# Patient Record
Sex: FEMALE | Race: BLACK OR AFRICAN AMERICAN | NOT HISPANIC OR LATINO | Employment: FULL TIME | URBAN - METROPOLITAN AREA
[De-identification: names, ages, dates, MRNs, and addresses within clinical notes are randomized per-mention and may not be internally consistent; named-entity substitution may affect disease eponyms.]

---

## 2023-03-08 DIAGNOSIS — G89.29 OTHER CHRONIC PAIN: Primary | ICD-10-CM

## 2023-03-13 RX ORDER — GABAPENTIN 300 MG/1
CAPSULE ORAL
Qty: 180 CAPSULE | Refills: 0 | Status: SHIPPED | OUTPATIENT
Start: 2023-03-13 | End: 2023-06-15

## 2023-05-18 LAB
ALANINE AMINOTRANSFERASE (SGPT) (U/L) IN SER/PLAS: 24 U/L (ref 7–45)
ALBUMIN (G/DL) IN SER/PLAS: 4 G/DL (ref 3.4–5)
ALKALINE PHOSPHATASE (U/L) IN SER/PLAS: 82 U/L (ref 33–136)
ANION GAP IN SER/PLAS: 13 MMOL/L (ref 10–20)
ASPARTATE AMINOTRANSFERASE (SGOT) (U/L) IN SER/PLAS: 17 U/L (ref 9–39)
BILIRUBIN TOTAL (MG/DL) IN SER/PLAS: 0.5 MG/DL (ref 0–1.2)
CALCIUM (MG/DL) IN SER/PLAS: 9.9 MG/DL (ref 8.6–10.6)
CARBON DIOXIDE, TOTAL (MMOL/L) IN SER/PLAS: 28 MMOL/L (ref 21–32)
CHLORIDE (MMOL/L) IN SER/PLAS: 107 MMOL/L (ref 98–107)
CREATININE (MG/DL) IN SER/PLAS: 0.91 MG/DL (ref 0.5–1.05)
ESTIMATED AVERAGE GLUCOSE FOR HBA1C: 194 MG/DL
GFR FEMALE: 72 ML/MIN/1.73M2
GLUCOSE (MG/DL) IN SER/PLAS: 105 MG/DL (ref 74–99)
HEMOGLOBIN A1C/HEMOGLOBIN TOTAL IN BLOOD: 8.4 %
POTASSIUM (MMOL/L) IN SER/PLAS: 4.7 MMOL/L (ref 3.5–5.3)
PROTEIN TOTAL: 7.1 G/DL (ref 6.4–8.2)
SODIUM (MMOL/L) IN SER/PLAS: 143 MMOL/L (ref 136–145)
UREA NITROGEN (MG/DL) IN SER/PLAS: 24 MG/DL (ref 6–23)

## 2023-08-04 DIAGNOSIS — G89.29 OTHER CHRONIC PAIN: ICD-10-CM

## 2023-08-11 RX ORDER — GABAPENTIN 300 MG/1
300 CAPSULE ORAL 2 TIMES DAILY
Qty: 180 CAPSULE | Refills: 0 | OUTPATIENT
Start: 2023-08-11

## 2023-08-21 DIAGNOSIS — G89.29 OTHER CHRONIC PAIN: ICD-10-CM

## 2023-08-28 RX ORDER — GABAPENTIN 300 MG/1
CAPSULE ORAL
Qty: 180 CAPSULE | Refills: 0 | OUTPATIENT
Start: 2023-08-28

## 2023-09-30 DIAGNOSIS — I10 HYPERTENSION, UNSPECIFIED TYPE: Primary | ICD-10-CM

## 2023-10-09 RX ORDER — METOPROLOL SUCCINATE 50 MG/1
50 TABLET, EXTENDED RELEASE ORAL DAILY
Qty: 90 TABLET | Refills: 1 | Status: SHIPPED | OUTPATIENT
Start: 2023-10-09 | End: 2023-11-07 | Stop reason: SDUPTHER

## 2023-10-17 ENCOUNTER — TELEPHONE (OUTPATIENT)
Dept: ENDOCRINOLOGY | Facility: CLINIC | Age: 63
End: 2023-10-17
Payer: COMMERCIAL

## 2023-10-17 NOTE — TELEPHONE ENCOUNTER
Pt states that she has had cramping in both legs and in one leg on Sunday she woke up out of sleep to a constricted feeling in just one leg. No swelling. Pt has not contacted PCP as she thought this has to do with neuropathy since she is on gabapentin. I did advise pt that she should contact pcp also.

## 2023-10-20 PROBLEM — M25.521 RIGHT ELBOW PAIN: Status: ACTIVE | Noted: 2023-10-20

## 2023-10-20 PROBLEM — J30.9 ALLERGIC RHINITIS: Status: ACTIVE | Noted: 2023-10-20

## 2023-10-20 PROBLEM — E11.3513 TYPE 2 DIABETES MELLITUS WITH PROLIFERATIVE DIABETIC RETINOPATHY WITH MACULAR EDEMA, BILATERAL (MULTI): Status: ACTIVE | Noted: 2023-10-20

## 2023-10-20 PROBLEM — H25.813 COMBINED FORM OF AGE-RELATED CATARACT, BOTH EYES: Status: ACTIVE | Noted: 2023-10-20

## 2023-10-20 PROBLEM — U07.1 COVID-19 VIRUS INFECTION: Status: ACTIVE | Noted: 2023-10-20

## 2023-10-20 PROBLEM — N39.0 RECURRENT UTI: Status: ACTIVE | Noted: 2023-10-20

## 2023-10-20 PROBLEM — M25.512 LEFT SHOULDER PAIN: Status: ACTIVE | Noted: 2023-10-20

## 2023-10-20 PROBLEM — E78.5 HLD (HYPERLIPIDEMIA): Status: ACTIVE | Noted: 2023-10-20

## 2023-10-20 PROBLEM — G47.33 OBSTRUCTIVE SLEEP APNEA: Status: ACTIVE | Noted: 2023-10-20

## 2023-10-20 PROBLEM — R29.898 LEFT ARM WEAKNESS: Status: ACTIVE | Noted: 2023-10-20

## 2023-10-20 PROBLEM — E11.65 POORLY CONTROLLED DIABETES MELLITUS (MULTI): Status: ACTIVE | Noted: 2023-10-20

## 2023-10-20 PROBLEM — H52.10 MYOPIA WITH PRESBYOPIA: Status: ACTIVE | Noted: 2023-10-20

## 2023-10-20 PROBLEM — M54.12 CERVICAL RADICULOPATHY: Status: ACTIVE | Noted: 2023-10-20

## 2023-10-20 PROBLEM — E11.3492: Status: ACTIVE | Noted: 2023-10-20

## 2023-10-20 PROBLEM — M79.603 ARM PAIN, LATERAL: Status: ACTIVE | Noted: 2023-10-20

## 2023-10-20 PROBLEM — S63.502A LEFT WRIST SPRAIN, INITIAL ENCOUNTER: Status: ACTIVE | Noted: 2023-10-20

## 2023-10-20 PROBLEM — M79.602 PAIN OF LEFT UPPER EXTREMITY: Status: ACTIVE | Noted: 2023-10-20

## 2023-10-20 PROBLEM — M75.02 ADHESIVE CAPSULITIS OF LEFT SHOULDER: Status: ACTIVE | Noted: 2023-10-20

## 2023-10-20 PROBLEM — H43.393 VITREOUS SYNERESIS OF BOTH EYES: Status: ACTIVE | Noted: 2023-10-20

## 2023-10-20 PROBLEM — R15.9 FECAL INCONTINENCE: Status: ACTIVE | Noted: 2023-10-20

## 2023-10-20 PROBLEM — H53.9 VISION CHANGES: Status: ACTIVE | Noted: 2023-10-20

## 2023-10-20 PROBLEM — K59.00 CONSTIPATION: Status: ACTIVE | Noted: 2023-10-20

## 2023-10-20 PROBLEM — E11.3599 PDR (PROLIFERATIVE DIABETIC RETINOPATHY) (MULTI): Status: ACTIVE | Noted: 2023-10-20

## 2023-10-20 PROBLEM — M54.50 LOW BACK PAIN: Status: ACTIVE | Noted: 2023-10-20

## 2023-10-20 PROBLEM — R06.83 SNORING: Status: ACTIVE | Noted: 2023-10-20

## 2023-10-20 PROBLEM — N39.41 URGE INCONTINENCE OF URINE: Status: ACTIVE | Noted: 2023-10-20

## 2023-10-20 PROBLEM — H52.4 MYOPIA WITH PRESBYOPIA: Status: ACTIVE | Noted: 2023-10-20

## 2023-10-20 PROBLEM — K58.0 IRRITABLE BOWEL SYNDROME WITH DIARRHEA: Status: ACTIVE | Noted: 2023-10-20

## 2023-10-20 PROBLEM — M77.11 LATERAL EPICONDYLITIS OF RIGHT ELBOW: Status: ACTIVE | Noted: 2023-10-20

## 2023-10-20 PROBLEM — S49.92XA SHOULDER INJURY, LEFT, INITIAL ENCOUNTER: Status: ACTIVE | Noted: 2023-10-20
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
## 2023-10-20 PROBLEM — G56.01 RIGHT CARPAL TUNNEL SYNDROME: Status: ACTIVE | Noted: 2023-10-20

## 2023-10-20 PROBLEM — R20.2 TINGLING IN EXTREMITIES: Status: ACTIVE | Noted: 2023-10-20

## 2023-10-20 PROBLEM — R19.7 DIARRHEA: Status: ACTIVE | Noted: 2023-10-20

## 2023-10-20 PROBLEM — S43.402A SPRAIN OF SHOULDER, LEFT: Status: ACTIVE | Noted: 2023-10-20

## 2023-10-20 PROBLEM — G56.02 LEFT CARPAL TUNNEL SYNDROME: Status: ACTIVE | Noted: 2023-10-20

## 2023-10-20 PROBLEM — I10 ESSENTIAL HYPERTENSION: Status: ACTIVE | Noted: 2023-10-20

## 2023-10-20 PROBLEM — H52.00 HYPEROPIA: Status: ACTIVE | Noted: 2023-10-20

## 2023-10-20 PROBLEM — M54.2 CERVICAL PAIN: Status: ACTIVE | Noted: 2023-10-20

## 2023-10-20 PROBLEM — R42 LIGHTHEADEDNESS: Status: ACTIVE | Noted: 2023-10-20

## 2023-10-20 PROBLEM — M65.4 RADIAL STYLOID TENOSYNOVITIS (DE QUERVAIN): Status: ACTIVE | Noted: 2023-10-20

## 2023-10-20 PROBLEM — H52.209 ASTIGMATISM: Status: ACTIVE | Noted: 2023-10-20

## 2023-10-20 PROBLEM — R25.2 MUSCLE CRAMPING: Status: ACTIVE | Noted: 2023-10-20

## 2023-10-20 PROBLEM — G47.00 INSOMNIA: Status: ACTIVE | Noted: 2023-10-20

## 2023-10-20 PROBLEM — S13.9XXA CERVICAL SPRAIN, INITIAL ENCOUNTER: Status: ACTIVE | Noted: 2023-10-20

## 2023-10-20 PROBLEM — R14.0 ABDOMINAL BLOATING: Status: ACTIVE | Noted: 2023-10-20

## 2023-10-20 PROBLEM — M76.31 ILIOTIBIAL BAND TENDINITIS OF RIGHT SIDE: Status: ACTIVE | Noted: 2023-10-20

## 2023-10-20 PROBLEM — R10.2 PELVIC PAIN IN FEMALE: Status: ACTIVE | Noted: 2023-10-20

## 2023-10-20 PROBLEM — R31.29 MICROHEMATURIA: Status: ACTIVE | Noted: 2023-10-20

## 2023-10-20 PROBLEM — M25.862 MASS OF JOINT OF LEFT KNEE: Status: ACTIVE | Noted: 2023-10-20

## 2023-10-20 PROBLEM — K21.9 GERD (GASTROESOPHAGEAL REFLUX DISEASE): Status: ACTIVE | Noted: 2023-10-20

## 2023-10-20 PROBLEM — S19.9XXA NECK INJURY, INITIAL ENCOUNTER: Status: ACTIVE | Noted: 2023-10-20

## 2023-10-20 RX ORDER — LEVOCETIRIZINE DIHYDROCHLORIDE 5 MG/1
1 TABLET, FILM COATED ORAL NIGHTLY
COMMUNITY
Start: 2022-09-27 | End: 2023-10-23 | Stop reason: ALTCHOICE

## 2023-10-20 RX ORDER — NEOMYCIN SULFATE, POLYMYXIN B SULFATE AND DEXAMETHASONE 3.5; 10000; 1 MG/ML; [USP'U]/ML; MG/ML
1 SUSPENSION/ DROPS OPHTHALMIC 4 TIMES DAILY
COMMUNITY
Start: 2022-02-18 | End: 2023-10-23 | Stop reason: ALTCHOICE

## 2023-10-20 RX ORDER — FLASH GLUCOSE SENSOR
KIT MISCELLANEOUS
COMMUNITY
Start: 2023-09-25 | End: 2024-02-23

## 2023-10-20 RX ORDER — PREDNISONE 20 MG/1
20 TABLET ORAL
COMMUNITY
Start: 2022-10-08 | End: 2023-10-23 | Stop reason: ALTCHOICE

## 2023-10-20 RX ORDER — NAPROXEN SODIUM 220 MG/1
81 TABLET, FILM COATED ORAL
COMMUNITY
Start: 2018-05-23

## 2023-10-20 RX ORDER — DULAGLUTIDE 4.5 MG/.5ML
INJECTION, SOLUTION SUBCUTANEOUS
COMMUNITY
Start: 2022-03-31 | End: 2024-01-21 | Stop reason: SDUPTHER

## 2023-10-20 RX ORDER — LANOLIN ALCOHOL/MO/W.PET/CERES
1 CREAM (GRAM) TOPICAL DAILY
COMMUNITY

## 2023-10-20 RX ORDER — ATORVASTATIN CALCIUM 40 MG/1
1 TABLET, FILM COATED ORAL DAILY
COMMUNITY
Start: 2017-04-10 | End: 2024-01-24 | Stop reason: SDUPTHER

## 2023-10-20 RX ORDER — INSULIN GLARGINE-YFGN 100 [IU]/ML
INJECTION, SOLUTION SUBCUTANEOUS
COMMUNITY
Start: 2021-12-06 | End: 2023-11-17

## 2023-10-20 RX ORDER — INSULIN LISPRO 100 [IU]/ML
INJECTION, SOLUTION INTRAVENOUS; SUBCUTANEOUS
COMMUNITY
Start: 2020-09-01 | End: 2023-10-23 | Stop reason: ALTCHOICE

## 2023-10-20 RX ORDER — MOXIFLOXACIN HYDROCHLORIDE 400 MG/1
1 TABLET ORAL DAILY
COMMUNITY
Start: 2023-05-24 | End: 2023-10-23 | Stop reason: ALTCHOICE

## 2023-10-20 RX ORDER — INSULIN ASPART 100 [IU]/ML
INJECTION, SOLUTION INTRAVENOUS; SUBCUTANEOUS
COMMUNITY
End: 2024-01-23 | Stop reason: WASHOUT

## 2023-10-20 RX ORDER — PANTOPRAZOLE SODIUM 40 MG/1
1 TABLET, DELAYED RELEASE ORAL DAILY
COMMUNITY
Start: 2023-02-24 | End: 2024-01-23 | Stop reason: SDUPTHER

## 2023-10-20 RX ORDER — CETIRIZINE HYDROCHLORIDE 10 MG/1
1 TABLET ORAL NIGHTLY
COMMUNITY
End: 2023-10-23 | Stop reason: ALTCHOICE

## 2023-10-20 RX ORDER — BACLOFEN 10 MG/1
5 TABLET ORAL 2 TIMES DAILY
COMMUNITY
Start: 2022-05-12 | End: 2023-10-23 | Stop reason: ALTCHOICE

## 2023-10-20 RX ORDER — AMLODIPINE BESYLATE 5 MG/1
1 TABLET ORAL DAILY
COMMUNITY
Start: 2020-07-30 | End: 2024-01-24 | Stop reason: SDUPTHER

## 2023-10-20 RX ORDER — METHYLPREDNISOLONE 4 MG/1
TABLET ORAL
COMMUNITY
Start: 2023-09-17 | End: 2023-10-23 | Stop reason: ALTCHOICE

## 2023-10-20 RX ORDER — IBUPROFEN 800 MG/1
800 TABLET ORAL EVERY 8 HOURS
COMMUNITY
Start: 2019-08-05 | End: 2023-10-23 | Stop reason: ALTCHOICE

## 2023-10-20 RX ORDER — INSULIN GLARGINE 100 [IU]/ML
INJECTION, SOLUTION SUBCUTANEOUS
COMMUNITY
End: 2023-10-23 | Stop reason: ALTCHOICE

## 2023-10-20 RX ORDER — FLASH GLUCOSE SCANNING READER
EACH MISCELLANEOUS
COMMUNITY
Start: 2022-11-18 | End: 2024-06-04 | Stop reason: WASHOUT

## 2023-10-20 RX ORDER — MULTIVITAMIN
1 TABLET ORAL
COMMUNITY

## 2023-10-20 RX ORDER — DULAGLUTIDE 3 MG/.5ML
INJECTION, SOLUTION SUBCUTANEOUS
COMMUNITY
End: 2023-10-23 | Stop reason: ALTCHOICE

## 2023-10-20 RX ORDER — COVID-19 ANTIGEN TEST
KIT MISCELLANEOUS
COMMUNITY
Start: 2023-04-19 | End: 2023-10-23 | Stop reason: ALTCHOICE

## 2023-10-20 RX ORDER — EMPAGLIFLOZIN 10 MG/1
1 TABLET, FILM COATED ORAL DAILY
COMMUNITY
Start: 2022-05-26 | End: 2024-04-23 | Stop reason: SDUPTHER

## 2023-10-23 ENCOUNTER — LAB (OUTPATIENT)
Dept: LAB | Facility: LAB | Age: 63
End: 2023-10-23
Payer: COMMERCIAL

## 2023-10-23 ENCOUNTER — OFFICE VISIT (OUTPATIENT)
Dept: ENDOCRINOLOGY | Facility: CLINIC | Age: 63
End: 2023-10-23
Payer: COMMERCIAL

## 2023-10-23 VITALS
BODY MASS INDEX: 35.41 KG/M2 | RESPIRATION RATE: 16 BRPM | DIASTOLIC BLOOD PRESSURE: 70 MMHG | WEIGHT: 225.6 LBS | HEART RATE: 76 BPM | HEIGHT: 67 IN | SYSTOLIC BLOOD PRESSURE: 124 MMHG

## 2023-10-23 DIAGNOSIS — I10 ESSENTIAL HYPERTENSION: ICD-10-CM

## 2023-10-23 DIAGNOSIS — E11.65 POORLY CONTROLLED DIABETES MELLITUS (MULTI): ICD-10-CM

## 2023-10-23 DIAGNOSIS — E11.65 POORLY CONTROLLED DIABETES MELLITUS (MULTI): Primary | ICD-10-CM

## 2023-10-23 DIAGNOSIS — E78.5 HYPERLIPIDEMIA, UNSPECIFIED HYPERLIPIDEMIA TYPE: ICD-10-CM

## 2023-10-23 LAB
ALBUMIN SERPL BCP-MCNC: 4.2 G/DL (ref 3.4–5)
ALP SERPL-CCNC: 101 U/L (ref 33–136)
ALT SERPL W P-5'-P-CCNC: 44 U/L (ref 7–45)
ANION GAP SERPL CALC-SCNC: 12 MMOL/L (ref 10–20)
AST SERPL W P-5'-P-CCNC: 22 U/L (ref 9–39)
BILIRUB SERPL-MCNC: 0.5 MG/DL (ref 0–1.2)
BUN SERPL-MCNC: 18 MG/DL (ref 6–23)
CALCIUM SERPL-MCNC: 9.5 MG/DL (ref 8.6–10.6)
CHLORIDE SERPL-SCNC: 106 MMOL/L (ref 98–107)
CO2 SERPL-SCNC: 29 MMOL/L (ref 21–32)
CREAT SERPL-MCNC: 0.84 MG/DL (ref 0.5–1.05)
EST. AVERAGE GLUCOSE BLD GHB EST-MCNC: 203 MG/DL
GFR SERPL CREATININE-BSD FRML MDRD: 79 ML/MIN/1.73M*2
GLUCOSE SERPL-MCNC: 107 MG/DL (ref 74–99)
HBA1C MFR BLD: 8.7 %
POTASSIUM SERPL-SCNC: 4.6 MMOL/L (ref 3.5–5.3)
PROT SERPL-MCNC: 7 G/DL (ref 6.4–8.2)
SODIUM SERPL-SCNC: 142 MMOL/L (ref 136–145)

## 2023-10-23 PROCEDURE — 80053 COMPREHEN METABOLIC PANEL: CPT

## 2023-10-23 PROCEDURE — 3052F HG A1C>EQUAL 8.0%<EQUAL 9.0%: CPT | Performed by: INTERNAL MEDICINE

## 2023-10-23 PROCEDURE — 3078F DIAST BP <80 MM HG: CPT | Performed by: INTERNAL MEDICINE

## 2023-10-23 PROCEDURE — 36415 COLL VENOUS BLD VENIPUNCTURE: CPT

## 2023-10-23 PROCEDURE — 90471 IMMUNIZATION ADMIN: CPT | Performed by: INTERNAL MEDICINE

## 2023-10-23 PROCEDURE — 83036 HEMOGLOBIN GLYCOSYLATED A1C: CPT

## 2023-10-23 PROCEDURE — 99214 OFFICE O/P EST MOD 30 MIN: CPT | Performed by: INTERNAL MEDICINE

## 2023-10-23 PROCEDURE — 90686 IIV4 VACC NO PRSV 0.5 ML IM: CPT | Performed by: INTERNAL MEDICINE

## 2023-10-23 PROCEDURE — 1036F TOBACCO NON-USER: CPT | Performed by: INTERNAL MEDICINE

## 2023-10-23 PROCEDURE — 3074F SYST BP LT 130 MM HG: CPT | Performed by: INTERNAL MEDICINE

## 2023-10-23 ASSESSMENT — ENCOUNTER SYMPTOMS
SHORTNESS OF BREATH: 0
PALPITATIONS: 0
FATIGUE: 0
DIARRHEA: 0
NAUSEA: 0
HEADACHES: 0
COUGH: 0
FEVER: 0
VOMITING: 0
CHILLS: 0

## 2023-10-23 NOTE — PATIENT INSTRUCTIONS
Flu shot today  Bring ralph to each visit  Adjust semglee to 50 am and 45 pm  Adjust meal insulin to 18 units twice a day  Work on eating and exercise  Try coq10 over the counter for cramps.  If no results, stop atorvastatin for 2 wks to assess for response of cramps

## 2023-10-23 NOTE — PROGRESS NOTES
"Subjective   Patient ID: Elina Garcia is a 63 y.o. female who presents for Diabetes, Hyperlipidemia, and Hypertension.  HPI  Since last visit taking insulin as directed most of the time.  Forgot ralph device today.  Pm sugars still high and occ lows in am.  Forgot to adjust pm long acting   C/o one month of cramps. Spoke to pharmacist and taking k over the counter with some relief, on same statin for many years.  Feeling ok otherwise    Review of Systems   Constitutional:  Negative for chills, fatigue and fever.   Respiratory:  Negative for cough and shortness of breath.    Cardiovascular:  Negative for chest pain and palpitations.   Gastrointestinal:  Negative for diarrhea, nausea and vomiting.   Neurological:  Negative for headaches.       Objective     10/23/2023 9:59 AM    /70   Pulse 76   Resp 16   Weight 102 kg (225 lb 9.6 oz)   Height 1.702 m (5' 7\")       Physical Exam  Constitutional:       Appearance: Normal appearance. She is overweight.   HENT:      Head: Normocephalic and atraumatic.   Neck:      Thyroid: No thyroid mass, thyromegaly or thyroid tenderness.   Cardiovascular:      Rate and Rhythm: Normal rate and regular rhythm.      Heart sounds: No murmur heard.     No gallop.   Pulmonary:      Effort: Pulmonary effort is normal.      Breath sounds: Normal breath sounds.   Abdominal:      Palpations: Abdomen is soft.      Comments: benign   Neurological:      General: No focal deficit present.      Mental Status: She is alert and oriented to person, place, and time.      Deep Tendon Reflexes: Reflexes are normal and symmetric.   Psychiatric:         Behavior: Behavior is cooperative.         Assessment/Plan   Problem List Items Addressed This Visit             ICD-10-CM    Essential hypertension I10    HLD (hyperlipidemia) E78.5    Poorly controlled diabetes mellitus (CMS/[facility]) - Primary E11.65        Dm2:  Adjusted insulin to basal 50/45, meal to 18/18.  Must bring in meter  A1c today  Flu " shot today  Htn:  Bp excellent  Lipids:  Try coq 10 for cramps and check lytes today  If no response to coq 10 stop statin for 2 wk trial   Follow up in 3 months

## 2023-10-24 RX ORDER — EPINEPHRINE 0.22MG
100 AEROSOL WITH ADAPTER (ML) INHALATION DAILY
Qty: 90 CAPSULE | Refills: 1 | Status: SHIPPED | OUTPATIENT
Start: 2023-10-24 | End: 2024-10-23

## 2023-11-07 ENCOUNTER — OFFICE VISIT (OUTPATIENT)
Dept: PRIMARY CARE | Facility: CLINIC | Age: 63
End: 2023-11-07
Payer: COMMERCIAL

## 2023-11-07 VITALS
HEART RATE: 92 BPM | BODY MASS INDEX: 35.74 KG/M2 | DIASTOLIC BLOOD PRESSURE: 80 MMHG | HEIGHT: 67 IN | WEIGHT: 227.7 LBS | RESPIRATION RATE: 16 BRPM | TEMPERATURE: 97.2 F | SYSTOLIC BLOOD PRESSURE: 138 MMHG | OXYGEN SATURATION: 99 %

## 2023-11-07 DIAGNOSIS — G89.29 OTHER CHRONIC PAIN: ICD-10-CM

## 2023-11-07 DIAGNOSIS — E11.3513 TYPE 2 DIABETES MELLITUS WITH BOTH EYES AFFECTED BY PROLIFERATIVE RETINOPATHY AND MACULAR EDEMA, WITHOUT LONG-TERM CURRENT USE OF INSULIN (MULTI): ICD-10-CM

## 2023-11-07 DIAGNOSIS — R25.2 MUSCLE CRAMPING: Primary | ICD-10-CM

## 2023-11-07 DIAGNOSIS — B37.9 YEAST INFECTION: ICD-10-CM

## 2023-11-07 DIAGNOSIS — Z23 NEED FOR VACCINATION: ICD-10-CM

## 2023-11-07 DIAGNOSIS — I10 HYPERTENSION, UNSPECIFIED TYPE: ICD-10-CM

## 2023-11-07 DIAGNOSIS — N89.8 VAGINAL IRRITATION: ICD-10-CM

## 2023-11-07 PROCEDURE — 3079F DIAST BP 80-89 MM HG: CPT | Performed by: FAMILY MEDICINE

## 2023-11-07 PROCEDURE — 1036F TOBACCO NON-USER: CPT | Performed by: FAMILY MEDICINE

## 2023-11-07 PROCEDURE — 99214 OFFICE O/P EST MOD 30 MIN: CPT | Performed by: FAMILY MEDICINE

## 2023-11-07 PROCEDURE — 90750 HZV VACC RECOMBINANT IM: CPT | Performed by: FAMILY MEDICINE

## 2023-11-07 PROCEDURE — 3075F SYST BP GE 130 - 139MM HG: CPT | Performed by: FAMILY MEDICINE

## 2023-11-07 PROCEDURE — 3052F HG A1C>EQUAL 8.0%<EQUAL 9.0%: CPT | Performed by: FAMILY MEDICINE

## 2023-11-07 PROCEDURE — 90471 IMMUNIZATION ADMIN: CPT | Performed by: FAMILY MEDICINE

## 2023-11-07 RX ORDER — METOPROLOL SUCCINATE 50 MG/1
50 TABLET, EXTENDED RELEASE ORAL DAILY
Qty: 90 TABLET | Refills: 3 | Status: SHIPPED | OUTPATIENT
Start: 2023-11-07

## 2023-11-07 RX ORDER — ROSUVASTATIN CALCIUM 10 MG/1
10 TABLET, COATED ORAL
COMMUNITY
Start: 2011-07-26 | End: 2024-01-23 | Stop reason: WASHOUT

## 2023-11-07 RX ORDER — GABAPENTIN 300 MG/1
300 CAPSULE ORAL 2 TIMES DAILY
Qty: 180 CAPSULE | Refills: 3 | Status: SHIPPED | OUTPATIENT
Start: 2023-11-07

## 2023-11-07 RX ORDER — INSULIN ASPART 100 [IU]/ML
INJECTION, SUSPENSION SUBCUTANEOUS
COMMUNITY
Start: 2011-07-27 | End: 2024-01-23 | Stop reason: WASHOUT

## 2023-11-07 RX ORDER — FLUCONAZOLE 150 MG/1
150 TABLET ORAL ONCE
Qty: 2 TABLET | Refills: 0 | Status: SHIPPED | OUTPATIENT
Start: 2023-11-07 | End: 2023-11-07

## 2023-11-07 ASSESSMENT — PATIENT HEALTH QUESTIONNAIRE - PHQ9
2. FEELING DOWN, DEPRESSED OR HOPELESS: NOT AT ALL
SUM OF ALL RESPONSES TO PHQ9 QUESTIONS 1 AND 2: 0
1. LITTLE INTEREST OR PLEASURE IN DOING THINGS: NOT AT ALL

## 2023-11-07 NOTE — PROGRESS NOTES
"Subjective   Patient ID: Elina Garcia is a 63 y.o. female who presents for Follow-up (Pt is here for c/o bilateral leg cramping, and c/o vaginal irritation.).    HPI   Leg cramping X 4-6wks- only at night  Both legs on the lower legs with cramps that awaken her  States using potassium otc which is helping some    Vaginal irritation  Started after steroid pack- was thinking due to yeast infection  Itchiness, +some irritation with uriation as well  No vaginal d/c  No odor    Review of Systems  All systems reviewed and neg if not noted in the HPI above     Objective   /80   Pulse 92   Temp 36.2 °C (97.2 °F)   Resp 16   Ht 1.702 m (5' 7\")   Wt 103 kg (227 lb 11.2 oz)   SpO2 99%   BMI 35.66 kg/m²     Physical Exam    Constitutional: Well-nourished sitting on chair  Eyes: eye lids are without obvious rash or drooping.   Ears, Nose, Mouth, and Throat:  appear to be without deformity or rash. No lesions or masses noted. Hearing is grossly intact.   Respiratory: No gasping or shortness of breath noted, no use of accessory muscles noted.   Skin: No obvious rashes or lesions  identified on skin.   Psychiatric: Alert, orientation to person, place, and time. Recent/remote memory as evidenced through face-to-face interaction and discussion appear grossly intact.   Mood and affect are normal.     Assessment/Plan   Problem List Items Addressed This Visit             ICD-10-CM    Type 2 diabetes mellitus with proliferative diabetic retinopathy with macular edema, bilateral (CMS/[facility]) E11.3513     Hba1c 8.7  Recheck at next appt  continue healthy diet and exercise  Continue current meds for now  Consider increase Jardiance         Muscle cramping - Primary  Checking your electrolytes for a cause   Please try the below  to help with the muscle cramps at bed time:  - Stretches 2-3X per day,   - Tonic water- 1 cup prior to bed time,   - 1oz of apple cider vinegar in 6-8oz of water (can add a little honey to taste)  - " Proper hydration (at least 5 bottles of water daily)    - Daily exercise.  Please call the office if your do not find relief with all of the above treatment.  If you have any questions or concerns, please don't hesitate to call  R25.2    Relevant Orders    Comprehensive Metabolic Panel    Magnesium    Phosphorus     Other Visit Diagnoses         Codes    Other chronic pain     G89.29    Relevant Medications    gabapentin (Neurontin) 300 mg capsule    Hypertension, unspecified type     I10    Relevant Medications    metoprolol succinate XL (Toprol-XL) 50 mg 24 hr tablet    Vaginal irritation     N89.8    Relevant Medications    fluconazole (Diflucan) 150 mg tablet    Other Relevant Orders    Urine Culture    Urinalysis with Reflex Microscopic    Yeast infection     B37.9    Relevant Medications    fluconazole (Diflucan) 150 mg tablet    Other Relevant Orders    Urine Culture    Urinalysis with Reflex Microscopic    Need for vaccination     Z23    Relevant Orders    Zoster vaccine, recombinant, adult (SHINGRIX) (Completed)                Please follow up in  6 months for wellness or as needed.

## 2023-11-07 NOTE — PATIENT INSTRUCTIONS
COVID and RSV- can get from your local pharm    Muscle cramps:  Checking your electrolytes for a cause   Please try the below  to help with the muscle cramps at bed time:  - Stretches 2-3X per day,   - Tonic water- 1 cup prior to bed time,   - 1oz of apple cider vinegar in 6-8oz of water (can add a little honey to taste)  - Proper hydration (at least 5 bottles of water daily)    - Daily exercise.  Please call the office if your do not find relief with all of the above treatment.  If you have any questions or concerns, please don't hesitate to call     Yeast infection  - try diflucan  - if not better to call gyn for follow up      Please follow up in 8wks for Shingrix #2 and 6 months for wellness or as needed.       ** If labs or imaging ordered at today's visit, all the non-urgent results will be discussed at your next visit    If you have been referred for a special test or to a specialist please call  [phone] to schedule an appointment.  If you have any further questions, or if develop new or worsened symptoms, please give our office a call at [phone].

## 2023-11-17 DIAGNOSIS — E11.65 POORLY CONTROLLED DIABETES MELLITUS (MULTI): Primary | ICD-10-CM

## 2023-11-17 RX ORDER — DULAGLUTIDE 3 MG/.5ML
INJECTION, SOLUTION SUBCUTANEOUS
Qty: 2 ML | Refills: 3 | Status: SHIPPED | OUTPATIENT
Start: 2023-11-17 | End: 2024-01-21 | Stop reason: ALTCHOICE

## 2023-11-17 RX ORDER — INSULIN ASPART 100 [IU]/ML
INJECTION, SOLUTION INTRAVENOUS; SUBCUTANEOUS
Qty: 15 ML | Refills: 3 | Status: SHIPPED | OUTPATIENT
Start: 2023-11-17

## 2023-11-17 RX ORDER — INSULIN GLARGINE-YFGN 100 [IU]/ML
INJECTION, SOLUTION SUBCUTANEOUS
Qty: 30 ML | Refills: 3 | Status: SHIPPED | OUTPATIENT
Start: 2023-11-17

## 2023-12-28 ENCOUNTER — ANCILLARY PROCEDURE (OUTPATIENT)
Dept: RADIOLOGY | Facility: CLINIC | Age: 63
End: 2023-12-28
Payer: COMMERCIAL

## 2023-12-28 DIAGNOSIS — R05.9 COUGH, UNSPECIFIED TYPE: ICD-10-CM

## 2023-12-28 PROCEDURE — 71046 X-RAY EXAM CHEST 2 VIEWS: CPT

## 2023-12-28 PROCEDURE — 71046 X-RAY EXAM CHEST 2 VIEWS: CPT | Performed by: RADIOLOGY

## 2024-01-10 ENCOUNTER — APPOINTMENT (OUTPATIENT)
Dept: PRIMARY CARE | Facility: CLINIC | Age: 64
End: 2024-01-10
Payer: COMMERCIAL

## 2024-01-10 ENCOUNTER — TELEPHONE (OUTPATIENT)
Dept: PRIMARY CARE | Facility: CLINIC | Age: 64
End: 2024-01-10
Payer: COMMERCIAL

## 2024-01-10 NOTE — TELEPHONE ENCOUNTER
So sorry she is not feeling better  She may need a different abx  Can try OTC cough med like mucinex 600-1200mg every 12hrs      Unfortunately I do not have any availability today  I'd advise that your check out the below site for our  ON DEMAND services   On Demand Virtual Care is available for adults and children 1 year old and older.   These visits can be completed online or by phone, 7 days a week from 7 a.m. until 11 p.m.  Hope you feel better soon!        [id number]

## 2024-01-10 NOTE — TELEPHONE ENCOUNTER
Pt called into office and stated she has been sick for the past month. She thought she had a common cold on 12/15/23 and started taking over the counter meds to help suppress her symptoms. On christmas she felt worse and developed congestion really bad and she went to . There she was diagnosed with bronchitis and give antibiotics and an inhaler. She states she completed all meds and she is still with symptoms. She is still experiencing congestion with coughing. 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 She is very fatigue. She stated that as of yesterday she has vomited anything she drinks. She is asking there is anything you can give her to help?

## 2024-01-18 ENCOUNTER — TELEPHONE (OUTPATIENT)
Dept: PRIMARY CARE | Facility: CLINIC | Age: 64
End: 2024-01-18

## 2024-01-18 ENCOUNTER — CLINICAL SUPPORT (OUTPATIENT)
Dept: PRIMARY CARE | Facility: CLINIC | Age: 64
End: 2024-01-18
Payer: COMMERCIAL

## 2024-01-18 DIAGNOSIS — Z23 NEED FOR VACCINATION: ICD-10-CM

## 2024-01-18 PROCEDURE — 90750 HZV VACC RECOMBINANT IM: CPT | Performed by: FAMILY MEDICINE

## 2024-01-18 PROCEDURE — 90471 IMMUNIZATION ADMIN: CPT | Performed by: FAMILY MEDICINE

## 2024-01-18 NOTE — TELEPHONE ENCOUNTER
Pt was in office today for a shingles vaccine, which she thought it was a follow-up for her PCP. She states she has been dealing with this cough and congestion for the past month and has had no relief. She has been to [facility] and they prescribed her an antibiotic and an inhaler, she has used otc meds with no relief. She is asking for something that will help her with her symptoms so she can get back to work. She is losing out on money because she cannot be around people with coughing and congesting going on. She has tried the ON Demand system, she states she is getting no help from anyone.

## 2024-01-19 NOTE — TELEPHONE ENCOUNTER
Can schedule for sick visit - not in the office currently  I do not see the on demand appt???  Pls ask what they recommend and how it went

## 2024-01-21 DIAGNOSIS — E11.65 POORLY CONTROLLED DIABETES MELLITUS (MULTI): Primary | ICD-10-CM

## 2024-01-21 RX ORDER — DULAGLUTIDE 4.5 MG/.5ML
4.5 INJECTION, SOLUTION SUBCUTANEOUS
Qty: 12 PEN | Refills: 3 | Status: SHIPPED | OUTPATIENT
Start: 2024-01-21 | End: 2024-05-21 | Stop reason: SDUPTHER

## 2024-01-22 DIAGNOSIS — E78.5 HYPERLIPIDEMIA, UNSPECIFIED HYPERLIPIDEMIA TYPE: ICD-10-CM

## 2024-01-22 DIAGNOSIS — I10 HYPERTENSION, UNSPECIFIED TYPE: ICD-10-CM

## 2024-01-22 DIAGNOSIS — K21.9 GASTROESOPHAGEAL REFLUX DISEASE WITHOUT ESOPHAGITIS: Primary | ICD-10-CM

## 2024-01-23 ENCOUNTER — LAB (OUTPATIENT)
Dept: LAB | Facility: LAB | Age: 64
End: 2024-01-23
Payer: COMMERCIAL

## 2024-01-23 ENCOUNTER — OFFICE VISIT (OUTPATIENT)
Dept: ENDOCRINOLOGY | Facility: CLINIC | Age: 64
End: 2024-01-23
Payer: COMMERCIAL

## 2024-01-23 VITALS
BODY MASS INDEX: 36.82 KG/M2 | DIASTOLIC BLOOD PRESSURE: 76 MMHG | HEART RATE: 88 BPM | WEIGHT: 234.6 LBS | RESPIRATION RATE: 16 BRPM | HEIGHT: 67 IN | SYSTOLIC BLOOD PRESSURE: 120 MMHG

## 2024-01-23 DIAGNOSIS — I10 ESSENTIAL HYPERTENSION: ICD-10-CM

## 2024-01-23 DIAGNOSIS — E11.65 POORLY CONTROLLED DIABETES MELLITUS (MULTI): Primary | ICD-10-CM

## 2024-01-23 DIAGNOSIS — R25.2 MUSCLE CRAMPING: ICD-10-CM

## 2024-01-23 DIAGNOSIS — E11.65 POORLY CONTROLLED DIABETES MELLITUS (MULTI): ICD-10-CM

## 2024-01-23 DIAGNOSIS — E78.5 HYPERLIPIDEMIA, UNSPECIFIED HYPERLIPIDEMIA TYPE: ICD-10-CM

## 2024-01-23 DIAGNOSIS — J40 BRONCHITIS: ICD-10-CM

## 2024-01-23 DIAGNOSIS — N89.8 VAGINAL IRRITATION: ICD-10-CM

## 2024-01-23 DIAGNOSIS — E11.3513 TYPE 2 DIABETES MELLITUS WITH PROLIFERATIVE RETINOPATHY OF BOTH EYES AND MACULAR EDEMA, UNSPECIFIED WHETHER LONG TERM INSULIN USE (MULTI): Primary | ICD-10-CM

## 2024-01-23 DIAGNOSIS — B37.9 YEAST INFECTION: ICD-10-CM

## 2024-01-23 LAB
ALBUMIN SERPL BCP-MCNC: 3.8 G/DL (ref 3.4–5)
ALP SERPL-CCNC: 85 U/L (ref 33–136)
ALT SERPL W P-5'-P-CCNC: 26 U/L (ref 7–45)
ANION GAP SERPL CALC-SCNC: 16 MMOL/L (ref 10–20)
AST SERPL W P-5'-P-CCNC: 14 U/L (ref 9–39)
BILIRUB SERPL-MCNC: 0.5 MG/DL (ref 0–1.2)
BUN SERPL-MCNC: 19 MG/DL (ref 6–23)
CALCIUM SERPL-MCNC: 9.8 MG/DL (ref 8.6–10.6)
CHLORIDE SERPL-SCNC: 103 MMOL/L (ref 98–107)
CO2 SERPL-SCNC: 28 MMOL/L (ref 21–32)
CREAT SERPL-MCNC: 0.99 MG/DL (ref 0.5–1.05)
EGFRCR SERPLBLD CKD-EPI 2021: 65 ML/MIN/1.73M*2
ERYTHROCYTE [DISTWIDTH] IN BLOOD BY AUTOMATED COUNT: 12.3 % (ref 11.5–14.5)
EST. AVERAGE GLUCOSE BLD GHB EST-MCNC: 186 MG/DL
GLUCOSE SERPL-MCNC: 121 MG/DL (ref 74–99)
HBA1C MFR BLD: 8.1 %
HCT VFR BLD AUTO: 40.4 % (ref 36–46)
HGB BLD-MCNC: 12.9 G/DL (ref 12–16)
MAGNESIUM SERPL-MCNC: 2.29 MG/DL (ref 1.6–2.4)
MCH RBC QN AUTO: 30.1 PG (ref 26–34)
MCHC RBC AUTO-ENTMCNC: 31.9 G/DL (ref 32–36)
MCV RBC AUTO: 94 FL (ref 80–100)
NRBC BLD-RTO: 0 /100 WBCS (ref 0–0)
PHOSPHATE SERPL-MCNC: 4.5 MG/DL (ref 2.5–4.9)
PLATELET # BLD AUTO: 231 X10*3/UL (ref 150–450)
POTASSIUM SERPL-SCNC: 4.5 MMOL/L (ref 3.5–5.3)
PROT SERPL-MCNC: 6.6 G/DL (ref 6.4–8.2)
RBC # BLD AUTO: 4.29 X10*6/UL (ref 4–5.2)
SODIUM SERPL-SCNC: 142 MMOL/L (ref 136–145)
WBC # BLD AUTO: 6.4 X10*3/UL (ref 4.4–11.3)

## 2024-01-23 PROCEDURE — 83036 HEMOGLOBIN GLYCOSYLATED A1C: CPT

## 2024-01-23 PROCEDURE — 1036F TOBACCO NON-USER: CPT | Performed by: INTERNAL MEDICINE

## 2024-01-23 PROCEDURE — 84100 ASSAY OF PHOSPHORUS: CPT

## 2024-01-23 PROCEDURE — 36415 COLL VENOUS BLD VENIPUNCTURE: CPT

## 2024-01-23 PROCEDURE — 3078F DIAST BP <80 MM HG: CPT | Performed by: INTERNAL MEDICINE

## 2024-01-23 PROCEDURE — 83735 ASSAY OF MAGNESIUM: CPT

## 2024-01-23 PROCEDURE — 85027 COMPLETE CBC AUTOMATED: CPT

## 2024-01-23 PROCEDURE — 3074F SYST BP LT 130 MM HG: CPT | Performed by: INTERNAL MEDICINE

## 2024-01-23 PROCEDURE — 80053 COMPREHEN METABOLIC PANEL: CPT

## 2024-01-23 PROCEDURE — 99214 OFFICE O/P EST MOD 30 MIN: CPT | Performed by: INTERNAL MEDICINE

## 2024-01-23 RX ORDER — AMOXICILLIN AND CLAVULANATE POTASSIUM 875; 125 MG/1; MG/1
875 TABLET, FILM COATED ORAL 2 TIMES DAILY
Qty: 14 TABLET | Refills: 0 | Status: SHIPPED | OUTPATIENT
Start: 2024-01-23 | End: 2024-01-30

## 2024-01-23 RX ORDER — ALBUTEROL SULFATE 90 UG/1
AEROSOL, METERED RESPIRATORY (INHALATION)
COMMUNITY
Start: 2023-12-28 | End: 2024-04-12 | Stop reason: ALTCHOICE

## 2024-01-23 ASSESSMENT — ENCOUNTER SYMPTOMS
FEVER: 0
VOMITING: 0
DIARRHEA: 0
NAUSEA: 0
PALPITATIONS: 0
CHILLS: 0
COUGH: 0
SHORTNESS OF BREATH: 0
HEADACHES: 0
FATIGUE: 0
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
## 2024-01-24 ENCOUNTER — LAB (OUTPATIENT)
Dept: LAB | Facility: LAB | Age: 64
End: 2024-01-24
Payer: COMMERCIAL

## 2024-01-24 DIAGNOSIS — E11.3513 TYPE 2 DIABETES MELLITUS WITH PROLIFERATIVE RETINOPATHY OF BOTH EYES AND MACULAR EDEMA, UNSPECIFIED WHETHER LONG TERM INSULIN USE (MULTI): ICD-10-CM

## 2024-01-24 DIAGNOSIS — B37.9 YEAST INFECTION: ICD-10-CM

## 2024-01-24 DIAGNOSIS — N89.8 VAGINAL IRRITATION: ICD-10-CM

## 2024-01-24 LAB
APPEARANCE UR: CLEAR
BILIRUB UR STRIP.AUTO-MCNC: NEGATIVE MG/DL
COLOR UR: ABNORMAL
CREAT UR-MCNC: 42.3 MG/DL (ref 20–320)
GLUCOSE UR STRIP.AUTO-MCNC: ABNORMAL MG/DL
KETONES UR STRIP.AUTO-MCNC: NEGATIVE MG/DL
LEUKOCYTE ESTERASE UR QL STRIP.AUTO: ABNORMAL
MICROALBUMIN UR-MCNC: 83 MG/L
MICROALBUMIN/CREAT UR: 196.2 UG/MG CREAT
NITRITE UR QL STRIP.AUTO: NEGATIVE
PH UR STRIP.AUTO: 6 [PH]
PROT UR STRIP.AUTO-MCNC: NEGATIVE MG/DL
RBC # UR STRIP.AUTO: NEGATIVE /UL
RBC #/AREA URNS AUTO: NORMAL /HPF
SP GR UR STRIP.AUTO: 1.02
UROBILINOGEN UR STRIP.AUTO-MCNC: <2 MG/DL
WBC #/AREA URNS AUTO: NORMAL /HPF

## 2024-01-24 PROCEDURE — 81001 URINALYSIS AUTO W/SCOPE: CPT

## 2024-01-24 PROCEDURE — 82043 UR ALBUMIN QUANTITATIVE: CPT

## 2024-01-24 PROCEDURE — 82570 ASSAY OF URINE CREATININE: CPT

## 2024-01-24 RX ORDER — ATORVASTATIN CALCIUM 40 MG/1
40 TABLET, FILM COATED ORAL DAILY
Qty: 90 TABLET | Refills: 3 | Status: SHIPPED | OUTPATIENT
Start: 2024-01-24 | End: 2025-01-23

## 2024-01-24 RX ORDER — AMLODIPINE BESYLATE 5 MG/1
5 TABLET ORAL DAILY
Qty: 90 TABLET | Refills: 3 | Status: SHIPPED | OUTPATIENT
Start: 2024-01-24

## 2024-01-24 RX ORDER — PANTOPRAZOLE SODIUM 40 MG/1
40 TABLET, DELAYED RELEASE ORAL DAILY
Qty: 90 TABLET | Refills: 0 | Status: SHIPPED | OUTPATIENT
Start: 2024-01-24 | End: 2024-02-01

## 2024-01-25 DIAGNOSIS — K21.9 GASTROESOPHAGEAL REFLUX DISEASE WITHOUT ESOPHAGITIS: ICD-10-CM

## 2024-02-01 RX ORDER — PANTOPRAZOLE SODIUM 40 MG/1
40 TABLET, DELAYED RELEASE ORAL DAILY
Qty: 90 TABLET | Refills: 0 | Status: SHIPPED | OUTPATIENT
Start: 2024-02-01 | End: 2024-04-26 | Stop reason: SDUPTHER

## 2024-02-23 DIAGNOSIS — E11.65 POORLY CONTROLLED DIABETES MELLITUS (MULTI): Primary | ICD-10-CM

## 2024-02-23 RX ORDER — FLASH GLUCOSE SENSOR
KIT MISCELLANEOUS
Qty: 6 EACH | Refills: 3 | Status: SHIPPED | OUTPATIENT
Start: 2024-02-23 | End: 2024-02-24

## 2024-02-24 DIAGNOSIS — E11.65 POORLY CONTROLLED DIABETES MELLITUS (MULTI): ICD-10-CM

## 2024-02-24 RX ORDER — FLASH GLUCOSE SENSOR
KIT MISCELLANEOUS
Qty: 6 EACH | Refills: 3 | Status: SHIPPED | OUTPATIENT
Start: 2024-02-24 | End: 2024-06-04 | Stop reason: WASHOUT

## 2024-04-12 ENCOUNTER — TELEMEDICINE (OUTPATIENT)
Dept: PRIMARY CARE | Facility: CLINIC | Age: 64
End: 2024-04-12
Payer: COMMERCIAL

## 2024-04-12 ENCOUNTER — APPOINTMENT (OUTPATIENT)
Dept: PRIMARY CARE | Facility: CLINIC | Age: 64
End: 2024-04-12
Payer: COMMERCIAL

## 2024-04-12 DIAGNOSIS — R30.0 DYSURIA: Primary | ICD-10-CM

## 2024-04-12 PROCEDURE — 3052F HG A1C>EQUAL 8.0%<EQUAL 9.0%: CPT | Performed by: FAMILY MEDICINE

## 2024-04-12 PROCEDURE — 99212 OFFICE O/P EST SF 10 MIN: CPT | Performed by: FAMILY MEDICINE

## 2024-04-12 PROCEDURE — 1036F TOBACCO NON-USER: CPT | Performed by: FAMILY MEDICINE

## 2024-04-12 PROCEDURE — 3060F POS MICROALBUMINURIA REV: CPT | Performed by: FAMILY MEDICINE

## 2024-04-12 RX ORDER — SULFAMETHOXAZOLE AND TRIMETHOPRIM 800; 160 MG/1; MG/1
1 TABLET ORAL 2 TIMES DAILY
Qty: 10 TABLET | Refills: 0 | Status: SHIPPED | OUTPATIENT
Start: 2024-04-12 | End: 2024-04-23 | Stop reason: ALTCHOICE

## 2024-04-12 RX ORDER — PHENAZOPYRIDINE HYDROCHLORIDE 200 MG/1
200 TABLET, FILM COATED ORAL 3 TIMES DAILY PRN
Qty: 6 TABLET | Refills: 0 | Status: SHIPPED | OUTPATIENT
Start: 2024-04-12 | End: 2024-04-23 | Stop reason: ALTCHOICE

## 2024-04-12 RX ORDER — FLUCONAZOLE 150 MG/1
150 TABLET ORAL SEE ADMIN INSTRUCTIONS
Qty: 2 TABLET | Refills: 0 | Status: SHIPPED | OUTPATIENT
Start: 2024-04-12 | End: 2024-04-13

## 2024-04-12 NOTE — PATIENT INSTRUCTIONS
Dysuria  Check urine cultureYes  Take antibiotic as directed--bactrim   Pyridium as written  Rest and drink plenty of fluids  Follow up If no improvement or if symptoms worsen in 48 hours OR if symptoms persist after completing the antibiotics OR if you develop fevers, severe back or abdominal pain or any other concerning symptoms.     Please send me a [employer] message if you have any questions or concerns.  FOR NON URGENT questions only.  Allow up to 72 hours for response.    If you have prescription issues or other questions you can email   Daniel Young  Digital Health Coordinator, at   [email or web address]     Rest and drink plenty of fluids    Tylenol and or motrin as needed for pain and fever (unless you have been told not to take these because of your personal medical history)    Discussed options and precautions (complaint specific and may include)  Viral versus bacterial infection; use of medications; possible side effects; appropriate over-the-counter medications; possible complications and /or when to follow-up.    Follow-up in 1 to 2 days if not improving.  Follow-up immediately if symptoms worsen.    All red flags requiring in person care were discussed.  All patient's questions were answered.    To connect with a new PCP please visit [email or web address] or call [phone]     If experiencing any severe or worsening symptoms including but not limited to lethargy / chest pain / weakness / dizziness / difficulty breathing please call 911 or go to the emergency department for immediate care!    Limitations to telemedicine include inability to do a complete and accurate physical exam.  Any concerns regarding this were conveyed with the patient and in person follow-up recommended if patient nature of illness does not progress as anticipated during this visit.    CDC updated Respiratory Infection guidelines:   When you have a respiratory virus, stay home and away  from others (including people  you live with who are not sick) Symptoms can include fever, chills, fatigue, cough,  runny nose, and headache (and others).    You can go back to your normal activities when,   for at least 24 hours,   BOTH are true:    1) Your symptoms are getting better overall  2) You have not had a fever (and are not using fever-reducing medication).    When you go back to your normal activities, take added precaution over the next 5 days, such as taking additional steps for  air, hygiene, masks, physical distancing, and/or testing when you will be around other people indoors.    Keep in mind that you may still be able to spread the virus that made you sick, even if you are feeling better. You are likely to be less contagious at this time, depending on factors like how long you were sick or how sick you were.    If you develop a fever or you start to feel worse after you have gone back to normal activities, stay home and away from others again until, for at least 24 hours, both are true: your symptoms are improving overall, and you have not had a fever (and are not using fever-reducing medication). Then take added precaution for the next 5 days.    If you never had symptoms but tested positive for a respiratory virus?, you may be contagious. For the next 5 days: take added precaution, such as taking additional steps for  air, hygiene, masks, physical distancing, and/or testing when you will be around other people indoors. This is especially important to protect people with factors that increase their risk of severe illness from respiratory viruses.  Avoid immunocompromised, elderly, pregnant women, infants etc    Have a low threshold for in person evaluation if your symptoms worsen.

## 2024-04-12 NOTE — PROGRESS NOTES
I performed this visit using realtime telehealth tools, including an audio/video OR telephone connection between the patient listed who was located in the [place] and myself, Jenny Snyder (Board certified in the [facility]).  At the start of the visit, I introduced myself as Dr. Lacey and verified the patients name, , and current physical location.    If they were currently outside of the state of [place], the visit was ended and the patient was referred to alternative means for evaluation and treatment.   The patient was made aware of the limitations of the telehealth visit.  They will not be physically examined and all issues may not be appropriate for a telehealth visit.  If necessary, an in person referral will be made.      DISCLAIMER:   In preparing for this visit and writing this note, I reviewed previous electronic medical records (labs, imaging and medical charts) of the patient available in the physician portal. Significant findings which helped in decision making are recorded in this encounter charting.     At the completion of the visit, the plan was discussed with the patient.  All questions were answered the patient verbalized understanding.     All allergies were reviewed as well as reconciliation of all medications.      Chief Complaint: urinary symptoms    HPI: x 1 month with urgency  Dysuria x a couple weeks  Went to [facility] and tried AZO OTC and the home test for UTI--  +Leuk +nitrites    > 4 months since last UTI? No  Sxs have persisted for 12+ days  Sxs are are worsening  Dysuria?Yes  Frequency? Yes  Urgency? Yes  Blood in urine? No    Fever, chills, body aches? No  N,V,Diarrhea? No    Abnormal vaginal bleeding? No  Abnormal vaginal pain? No  Abnormal vaginal discharge No    Flank pain? No  Abdominal pain?  No    H/o kidney stones?No  H/o kidney infection? No    OTC meds? Yes AZO for pain with some relief at initial symptoms    All other ROS (-)    Physical Exam:  Alert in  NAD  Affect normal  Eyes clear  No resp distress or audible wheezing  CVA TTP  No  Abdominal TTP? No      Dysuria  Check urine cultureYes  Take antibiotic as directed--bactrim   Pyridium as written  Rest and drink plenty of fluids  Follow up If no improvement or if symptoms worsen in 48 hours OR if symptoms persist after completing the antibiotics OR if you develop fevers, severe back or abdominal pain or any other concerning symptoms.   Diflucan as needed      4/15/24  Urinalysis + for glucose and protein-- (-) for nitrites and leuks  Called lab re: urine culture--it was not done-- needs another specimen--  Called pt to check on her symptoms--left a VM and sent her a [employer] message in regards to her symptoms    April 15, 2024  Me   to Elina Garcia     4/15/24 11:18 AM  Good morning!  I am checking in so see how you are feeling after taking the antibiotics.  The urinalysis did not show any signs of infection, although there was a high amount of glucose (from you BS being high) and some protein (likely also from the DM).  Unfortunately, the lab did not run the urine culture.  I just called them and they would need to get another specimen to run a culture.  Since you have been on antibiotics, that culture may not be helpful.  Have your symptoms completely resolved with the antibiotics?

## 2024-04-13 ENCOUNTER — LAB (OUTPATIENT)
Dept: LAB | Facility: LAB | Age: 64
End: 2024-04-13
Payer: COMMERCIAL

## 2024-04-13 DIAGNOSIS — R30.0 DYSURIA: ICD-10-CM

## 2024-04-13 LAB
APPEARANCE UR: CLEAR
BILIRUB UR STRIP.AUTO-MCNC: NEGATIVE MG/DL
COLOR UR: ABNORMAL
GLUCOSE UR STRIP.AUTO-MCNC: ABNORMAL MG/DL
KETONES UR STRIP.AUTO-MCNC: NEGATIVE MG/DL
LEUKOCYTE ESTERASE UR QL STRIP.AUTO: NEGATIVE
NITRITE UR QL STRIP.AUTO: NEGATIVE
PH UR STRIP.AUTO: 6 [PH]
PROT UR STRIP.AUTO-MCNC: ABNORMAL MG/DL
RBC # UR STRIP.AUTO: NEGATIVE /UL
RBC #/AREA URNS AUTO: NORMAL /HPF
SP GR UR STRIP.AUTO: 1.02
UROBILINOGEN UR STRIP.AUTO-MCNC: NORMAL MG/DL
WBC #/AREA URNS AUTO: NORMAL /HPF

## 2024-04-13 PROCEDURE — 81001 URINALYSIS AUTO W/SCOPE: CPT

## 2024-04-23 ENCOUNTER — OFFICE VISIT (OUTPATIENT)
Dept: ENDOCRINOLOGY | Facility: CLINIC | Age: 64
End: 2024-04-23
Payer: COMMERCIAL

## 2024-04-23 ENCOUNTER — LAB (OUTPATIENT)
Dept: LAB | Facility: LAB | Age: 64
End: 2024-04-23
Payer: COMMERCIAL

## 2024-04-23 VITALS
BODY MASS INDEX: 36.47 KG/M2 | HEART RATE: 88 BPM | HEIGHT: 67 IN | SYSTOLIC BLOOD PRESSURE: 132 MMHG | DIASTOLIC BLOOD PRESSURE: 70 MMHG | WEIGHT: 232.4 LBS | RESPIRATION RATE: 16 BRPM

## 2024-04-23 DIAGNOSIS — E11.65 POORLY CONTROLLED DIABETES MELLITUS (MULTI): Primary | ICD-10-CM

## 2024-04-23 DIAGNOSIS — I10 ESSENTIAL HYPERTENSION: ICD-10-CM

## 2024-04-23 DIAGNOSIS — E11.65 POORLY CONTROLLED DIABETES MELLITUS (MULTI): ICD-10-CM

## 2024-04-23 DIAGNOSIS — E78.5 HYPERLIPIDEMIA, UNSPECIFIED HYPERLIPIDEMIA TYPE: ICD-10-CM

## 2024-04-23 LAB
ALBUMIN SERPL BCP-MCNC: 3.9 G/DL (ref 3.4–5)
ALP SERPL-CCNC: 102 U/L (ref 33–136)
ALT SERPL W P-5'-P-CCNC: 32 U/L (ref 7–45)
ANION GAP SERPL CALC-SCNC: 13 MMOL/L (ref 10–20)
AST SERPL W P-5'-P-CCNC: 19 U/L (ref 9–39)
BILIRUB SERPL-MCNC: 0.6 MG/DL (ref 0–1.2)
BUN SERPL-MCNC: 21 MG/DL (ref 6–23)
CALCIUM SERPL-MCNC: 9.3 MG/DL (ref 8.6–10.6)
CHLORIDE SERPL-SCNC: 103 MMOL/L (ref 98–107)
CO2 SERPL-SCNC: 27 MMOL/L (ref 21–32)
CREAT SERPL-MCNC: 0.92 MG/DL (ref 0.5–1.05)
EGFRCR SERPLBLD CKD-EPI 2021: 71 ML/MIN/1.73M*2
EST. AVERAGE GLUCOSE BLD GHB EST-MCNC: 197 MG/DL
GLUCOSE SERPL-MCNC: 88 MG/DL (ref 74–99)
HBA1C MFR BLD: 8.5 %
POTASSIUM SERPL-SCNC: 4.5 MMOL/L (ref 3.5–5.3)
PROT SERPL-MCNC: 7.1 G/DL (ref 6.4–8.2)
SODIUM SERPL-SCNC: 138 MMOL/L (ref 136–145)

## 2024-04-23 PROCEDURE — 3078F DIAST BP <80 MM HG: CPT | Performed by: INTERNAL MEDICINE

## 2024-04-23 PROCEDURE — 3060F POS MICROALBUMINURIA REV: CPT | Performed by: INTERNAL MEDICINE

## 2024-04-23 PROCEDURE — 80053 COMPREHEN METABOLIC PANEL: CPT

## 2024-04-23 PROCEDURE — 83036 HEMOGLOBIN GLYCOSYLATED A1C: CPT

## 2024-04-23 PROCEDURE — 36415 COLL VENOUS BLD VENIPUNCTURE: CPT

## 2024-04-23 PROCEDURE — 3075F SYST BP GE 130 - 139MM HG: CPT | Performed by: INTERNAL MEDICINE

## 2024-04-23 PROCEDURE — 99214 OFFICE O/P EST MOD 30 MIN: CPT | Performed by: INTERNAL MEDICINE

## 2024-04-23 PROCEDURE — 1036F TOBACCO NON-USER: CPT | Performed by: INTERNAL MEDICINE

## 2024-04-23 PROCEDURE — 3052F HG A1C>EQUAL 8.0%<EQUAL 9.0%: CPT | Performed by: INTERNAL MEDICINE

## 2024-04-23 RX ORDER — BLOOD-GLUCOSE SENSOR
EACH MISCELLANEOUS
Qty: 9 EACH | Refills: 3 | Status: SHIPPED | OUTPATIENT
Start: 2024-04-23

## 2024-04-23 ASSESSMENT — ENCOUNTER SYMPTOMS
FEVER: 0
DIARRHEA: 0
COUGH: 0
FATIGUE: 0
NAUSEA: 0
PALPITATIONS: 0
HEADACHES: 0
SHORTNESS OF BREATH: 0
VOMITING: 0
CHILLS: 0
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
## 2024-04-24 ENCOUNTER — PATIENT MESSAGE (OUTPATIENT)
Dept: PRIMARY CARE | Facility: CLINIC | Age: 64
End: 2024-04-24
Payer: COMMERCIAL

## 2024-04-24 DIAGNOSIS — K21.9 GASTROESOPHAGEAL REFLUX DISEASE WITHOUT ESOPHAGITIS: ICD-10-CM

## 2024-04-26 RX ORDER — PANTOPRAZOLE SODIUM 40 MG/1
40 TABLET, DELAYED RELEASE ORAL DAILY
Qty: 90 TABLET | Refills: 0 | Status: SHIPPED | OUTPATIENT
Start: 2024-04-26

## 2024-04-26 NOTE — TELEPHONE ENCOUNTER
From: Elina Garcia  To: Bibi Bridges  Sent: 4/24/2024 6:07 PM EDT  Subject: Pantoprazole prescription renewal    I would appreciate you sending a renew/refill request on the Rx below.  Thanks.    pantoprazole 40 mg EC tablet  Commonly known as: ProtoNix  Learn more  TAKE 1 TABLET (40 MG) BY MOUTH ONCE DAILY.  [facility] [address] isn't available for refills through [employer]. Contact the pharmacy to refill this prescription.    Prescription Details  PrescribedFebruary 1, 2024  Approved byBibi Bridges  Prescription [id number]  Refill Details  [id number] tablets  Day [id number]  Pharmacy Details  [facility] - [address] [address] [phone]

## 2024-05-03 DIAGNOSIS — E11.65 POORLY CONTROLLED DIABETES MELLITUS (MULTI): Primary | ICD-10-CM

## 2024-05-03 RX ORDER — DULAGLUTIDE 3 MG/.5ML
3 INJECTION, SOLUTION SUBCUTANEOUS
Qty: 6 ML | Refills: 0 | Status: SHIPPED | OUTPATIENT
Start: 2024-05-05 | End: 2025-05-05

## 2024-05-16 ENCOUNTER — LAB (OUTPATIENT)
Dept: LAB | Facility: LAB | Age: 64
End: 2024-05-16
Payer: COMMERCIAL

## 2024-05-16 ENCOUNTER — OFFICE VISIT (OUTPATIENT)
Dept: PRIMARY CARE | Facility: CLINIC | Age: 64
End: 2024-05-16
Payer: COMMERCIAL

## 2024-05-16 VITALS
DIASTOLIC BLOOD PRESSURE: 78 MMHG | WEIGHT: 232.5 LBS | TEMPERATURE: 97 F | SYSTOLIC BLOOD PRESSURE: 126 MMHG | HEART RATE: 78 BPM | BODY MASS INDEX: 36.49 KG/M2 | RESPIRATION RATE: 15 BRPM | HEIGHT: 67 IN | OXYGEN SATURATION: 96 %

## 2024-05-16 DIAGNOSIS — E78.5 HYPERLIPIDEMIA, UNSPECIFIED HYPERLIPIDEMIA TYPE: ICD-10-CM

## 2024-05-16 DIAGNOSIS — E11.3513 TYPE 2 DIABETES MELLITUS WITH PROLIFERATIVE RETINOPATHY OF BOTH EYES AND MACULAR EDEMA, UNSPECIFIED WHETHER LONG TERM INSULIN USE (MULTI): ICD-10-CM

## 2024-05-16 DIAGNOSIS — E11.3513 TYPE 2 DIABETES MELLITUS WITH BOTH EYES AFFECTED BY PROLIFERATIVE RETINOPATHY AND MACULAR EDEMA, WITHOUT LONG-TERM CURRENT USE OF INSULIN (MULTI): ICD-10-CM

## 2024-05-16 DIAGNOSIS — Z00.00 WELLNESS EXAMINATION: Primary | ICD-10-CM

## 2024-05-16 DIAGNOSIS — Z12.31 ENCOUNTER FOR SCREENING MAMMOGRAM FOR BREAST CANCER: ICD-10-CM

## 2024-05-16 DIAGNOSIS — F07.81 POST CONCUSSION SYNDROME: ICD-10-CM

## 2024-05-16 DIAGNOSIS — R19.7 DIARRHEA, UNSPECIFIED TYPE: ICD-10-CM

## 2024-05-16 DIAGNOSIS — E66.01 CLASS 2 SEVERE OBESITY WITH SERIOUS COMORBIDITY AND BODY MASS INDEX (BMI) OF 36.0 TO 36.9 IN ADULT, UNSPECIFIED OBESITY TYPE (MULTI): ICD-10-CM

## 2024-05-16 DIAGNOSIS — J45.909 ASTHMA, UNSPECIFIED ASTHMA SEVERITY, UNSPECIFIED WHETHER COMPLICATED, UNSPECIFIED WHETHER PERSISTENT (HHS-HCC): ICD-10-CM

## 2024-05-16 PROBLEM — R00.0 TACHYCARDIA: Status: ACTIVE | Noted: 2024-05-16

## 2024-05-16 PROBLEM — J40 BRONCHITIS: Status: ACTIVE | Noted: 2023-12-27

## 2024-05-16 PROBLEM — R07.9 CHEST PAIN: Status: ACTIVE | Noted: 2024-05-16

## 2024-05-16 PROBLEM — J18.9 PNEUMONIA: Status: ACTIVE | Noted: 2023-12-27

## 2024-05-16 PROBLEM — G89.29 CHRONIC PAIN: Status: ACTIVE | Noted: 2024-05-16

## 2024-05-16 PROBLEM — R50.9 FEVER: Status: ACTIVE | Noted: 2024-05-16

## 2024-05-16 PROBLEM — Z86.39 HISTORY OF TYPE 2 DIABETES MELLITUS: Status: ACTIVE | Noted: 2024-05-16

## 2024-05-16 PROBLEM — R53.83 FATIGUE: Status: ACTIVE | Noted: 2024-05-16

## 2024-05-16 PROBLEM — B49 INFECTION DUE TO FUNGUS: Status: ACTIVE | Noted: 2024-01-23

## 2024-05-16 LAB
APPEARANCE UR: CLEAR
BILIRUB UR STRIP.AUTO-MCNC: NEGATIVE MG/DL
CHOLEST SERPL-MCNC: 169 MG/DL (ref 0–199)
CHOLESTEROL/HDL RATIO: 3.5
COLOR UR: ABNORMAL
CREAT UR-MCNC: 80.3 MG/DL (ref 20–320)
GLUCOSE UR STRIP.AUTO-MCNC: ABNORMAL MG/DL
HDLC SERPL-MCNC: 48.4 MG/DL
KETONES UR STRIP.AUTO-MCNC: NEGATIVE MG/DL
LDLC SERPL CALC-MCNC: 93 MG/DL
LEUKOCYTE ESTERASE UR QL STRIP.AUTO: ABNORMAL
MICROALBUMIN UR-MCNC: 374.7 MG/L
MICROALBUMIN/CREAT UR: 466.6 UG/MG CREAT
MUCOUS THREADS #/AREA URNS AUTO: ABNORMAL /LPF
NITRITE UR QL STRIP.AUTO: NEGATIVE
NON HDL CHOLESTEROL: 121 MG/DL (ref 0–149)
PH UR STRIP.AUTO: 5.5 [PH]
PROT UR STRIP.AUTO-MCNC: ABNORMAL MG/DL
RBC # UR STRIP.AUTO: NEGATIVE /UL
RBC #/AREA URNS AUTO: ABNORMAL /HPF
SP GR UR STRIP.AUTO: 1.03
SQUAMOUS #/AREA URNS AUTO: ABNORMAL /HPF
TRIGL SERPL-MCNC: 138 MG/DL (ref 0–149)
TSH SERPL-ACNC: 1.23 MIU/L (ref 0.44–3.98)
UROBILINOGEN UR STRIP.AUTO-MCNC: NORMAL MG/DL
VLDL: 28 MG/DL (ref 0–40)
WBC #/AREA URNS AUTO: ABNORMAL /HPF

## 2024-05-16 PROCEDURE — 3052F HG A1C>EQUAL 8.0%<EQUAL 9.0%: CPT | Performed by: FAMILY MEDICINE

## 2024-05-16 PROCEDURE — 99396 PREV VISIT EST AGE 40-64: CPT | Performed by: FAMILY MEDICINE

## 2024-05-16 PROCEDURE — 1036F TOBACCO NON-USER: CPT | Performed by: FAMILY MEDICINE

## 2024-05-16 PROCEDURE — 82043 UR ALBUMIN QUANTITATIVE: CPT

## 2024-05-16 PROCEDURE — 3078F DIAST BP <80 MM HG: CPT | Performed by: FAMILY MEDICINE

## 2024-05-16 PROCEDURE — 84443 ASSAY THYROID STIM HORMONE: CPT

## 2024-05-16 PROCEDURE — 3060F POS MICROALBUMINURIA REV: CPT | Performed by: FAMILY MEDICINE

## 2024-05-16 PROCEDURE — 3008F BODY MASS INDEX DOCD: CPT | Performed by: FAMILY MEDICINE

## 2024-05-16 PROCEDURE — 80061 LIPID PANEL: CPT

## 2024-05-16 PROCEDURE — 99214 OFFICE O/P EST MOD 30 MIN: CPT | Performed by: FAMILY MEDICINE

## 2024-05-16 PROCEDURE — 36415 COLL VENOUS BLD VENIPUNCTURE: CPT

## 2024-05-16 PROCEDURE — 3074F SYST BP LT 130 MM HG: CPT | Performed by: FAMILY MEDICINE

## 2024-05-16 PROCEDURE — 82570 ASSAY OF URINE CREATININE: CPT

## 2024-05-16 PROCEDURE — 81001 URINALYSIS AUTO W/SCOPE: CPT

## 2024-05-16 ASSESSMENT — PATIENT HEALTH QUESTIONNAIRE - PHQ9
SUM OF ALL RESPONSES TO PHQ9 QUESTIONS 1 AND 2: 0
1. LITTLE INTEREST OR PLEASURE IN DOING THINGS: NOT AT ALL
2. FEELING DOWN, DEPRESSED OR HOPELESS: NOT AT ALL

## 2024-05-16 ASSESSMENT — PROMIS GLOBAL HEALTH SCALE
RATE_MENTAL_HEALTH: GOOD
RATE_SOCIAL_SATISFACTION: VERY GOOD
CARRYOUT_PHYSICAL_ACTIVITIES: MODERATELY
RATE_GENERAL_HEALTH: GOOD
CARRYOUT_SOCIAL_ACTIVITIES: GOOD
RATE_AVERAGE_FATIGUE: MODERATE
RATE_PHYSICAL_HEALTH: GOOD
RATE_AVERAGE_PAIN: 7
RATE_QUALITY_OF_LIFE: GOOD
EMOTIONAL_PROBLEMS: SOMETIMES

## 2024-05-16 NOTE — PATIENT INSTRUCTIONS
Health Maintenance:  - Colonoscopy: 12/18/18- repeat in 10yrs  - Mammogram: 22- ordered- call for appt  - PAP: Had hysterectomy at age 40 for fibroids - can call for well woman with gyn  - Bone density DEXA:due 67    Be on the look out for a call from the [facility] pharm    Lab today    Mammogram- call and scheduled    Continue to work on healthy diet and exercise  We encourage all patients to engage in exercise 150 minutes per week   Adults 18 and older, activity during each week - if you are able:    Engage in at least 150 minutes of moderate or vigorous exercise per week   If you are able- Engage in muscle strengthening activity on 2 or more days per week      Wrist/ elbow  To call for OT- Dr. Perez placed referral    For your change in vision/ concussion  - to call ophtho  - referral for concussion - call for appt    Please follow up in  6months for HTN/DM or as needed.       ** If labs or imaging ordered at today's visit, all the non-urgent results will be discussed at your next visit    If you have been referred for a special test or to a specialist please call  [phone] to schedule an appointment.  If you have any further questions, or if develop new or worsened symptoms, please give our office a call at [phone].
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
## 2024-05-18 DIAGNOSIS — R82.90 ABNORMAL URINALYSIS: Primary | ICD-10-CM

## 2024-05-21 DIAGNOSIS — E11.65 POORLY CONTROLLED DIABETES MELLITUS (MULTI): ICD-10-CM

## 2024-05-21 RX ORDER — DULAGLUTIDE 4.5 MG/.5ML
4.5 INJECTION, SOLUTION SUBCUTANEOUS
Qty: 12 PEN | Refills: 3 | Status: SHIPPED | OUTPATIENT
Start: 2024-05-26 | End: 2025-05-26

## 2024-06-03 ENCOUNTER — OFFICE VISIT (OUTPATIENT)
Dept: NEUROSURGERY | Facility: CLINIC | Age: 64
End: 2024-06-03
Payer: COMMERCIAL

## 2024-06-03 VITALS
RESPIRATION RATE: 20 BRPM | HEART RATE: 88 BPM | WEIGHT: 230 LBS | BODY MASS INDEX: 34.86 KG/M2 | DIASTOLIC BLOOD PRESSURE: 101 MMHG | HEIGHT: 68 IN | SYSTOLIC BLOOD PRESSURE: 154 MMHG

## 2024-06-03 DIAGNOSIS — F07.81 POST CONCUSSION SYNDROME: ICD-10-CM

## 2024-06-03 PROCEDURE — 3080F DIAST BP >= 90 MM HG: CPT | Performed by: NEUROLOGICAL SURGERY

## 2024-06-03 PROCEDURE — 3062F POS MACROALBUMINURIA REV: CPT | Performed by: NEUROLOGICAL SURGERY

## 2024-06-03 PROCEDURE — 1036F TOBACCO NON-USER: CPT | Performed by: NEUROLOGICAL SURGERY

## 2024-06-03 PROCEDURE — 3077F SYST BP >= 140 MM HG: CPT | Performed by: NEUROLOGICAL SURGERY

## 2024-06-03 PROCEDURE — 3052F HG A1C>EQUAL 8.0%<EQUAL 9.0%: CPT | Performed by: NEUROLOGICAL SURGERY

## 2024-06-03 PROCEDURE — 3048F LDL-C <100 MG/DL: CPT | Performed by: NEUROLOGICAL SURGERY

## 2024-06-03 PROCEDURE — 3008F BODY MASS INDEX DOCD: CPT | Performed by: NEUROLOGICAL SURGERY

## 2024-06-03 PROCEDURE — 99202 OFFICE O/P NEW SF 15 MIN: CPT | Performed by: NEUROLOGICAL SURGERY

## 2024-06-03 ASSESSMENT — ENCOUNTER SYMPTOMS
HEADACHES: 1
ENDOCRINE NEGATIVE: 1
DIARRHEA: 1
NUMBNESS: 1
WEAKNESS: 1
BRUISES/BLEEDS EASILY: 1
DIZZINESS: 1
FATIGUE: 1
LIGHT-HEADEDNESS: 1
BACK PAIN: 1
ALLERGIC/IMMUNOLOGIC NEGATIVE: 1
FREQUENCY: 1
NECK PAIN: 1
CONFUSION: 1
APNEA: 1

## 2024-06-03 ASSESSMENT — PAIN SCALES - GENERAL: PAINLEVEL: 6
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
## 2024-06-04 ENCOUNTER — TELEMEDICINE (OUTPATIENT)
Dept: PHARMACY | Facility: HOSPITAL | Age: 64
End: 2024-06-04
Payer: COMMERCIAL

## 2024-06-04 DIAGNOSIS — E78.5 HYPERLIPIDEMIA, UNSPECIFIED HYPERLIPIDEMIA TYPE: ICD-10-CM

## 2024-06-04 DIAGNOSIS — E11.3513 TYPE 2 DIABETES MELLITUS WITH BOTH EYES AFFECTED BY PROLIFERATIVE RETINOPATHY AND MACULAR EDEMA, WITHOUT LONG-TERM CURRENT USE OF INSULIN (MULTI): ICD-10-CM

## 2024-06-04 DIAGNOSIS — J45.909 ASTHMA, UNSPECIFIED ASTHMA SEVERITY, UNSPECIFIED WHETHER COMPLICATED, UNSPECIFIED WHETHER PERSISTENT (HHS-HCC): ICD-10-CM

## 2024-06-04 RX ORDER — TIRZEPATIDE 2.5 MG/.5ML
2.5 INJECTION, SOLUTION SUBCUTANEOUS
Qty: 2 ML | Refills: 1 | Status: SHIPPED | OUTPATIENT
Start: 2024-06-09

## 2024-06-04 NOTE — PROGRESS NOTES
Subjective     Patient ID: Elina Garcia is a 63 y.o. female who presents for Diabetes.    Referring Provider: Bibi Bridges DO     Diabetes  She presents for her initial diabetic visit. She has type 2 diabetes mellitus. There are no hypoglycemic associated symptoms. There are no hypoglycemic complications. Risk factors for coronary artery disease include diabetes mellitus, dyslipidemia, obesity, hypertension and post-menopausal. Current diabetic treatment includes insulin injections and oral agent (monotherapy).       Diet: Breakfast: Metamucil / Franco / eggs  Lunch: Protein pack / sugar free jello or applesauce  Dinner: Protein pork or beef / vegetables / sometimes potato fries / sometimes rice  Snacks: potato chips / Oikos yogurt    Exercise: Walks everyday /  basket ball to kids occasionally    Allergies   Allergen Reactions    Metformin Unknown       Objective     Current DM Pharmacotherapy:   Jardiance 10 mg: Take 1 tablet (10 mg) by mouth once daily   Semglee Insulin:  INJECT 42 UNITS UNDER THE SKIN EVERY MORNING AND 42 UNITS EVERY EVENING   Novolog Flexpen U-100 Insulin: INJECT PER SLIDING SCALE WITH MEALS. UP TO 40 UNITS PER DAY.     SECONDARY PREVENTION  - Statin? Yes  - ACE-I/ARB? No  - Aspirin? Yes    Current monitoring regimen:   Patient is using: continuous glucose monitor    Testing frequency:     SMBG Readings: -110 mg / dL  PPBG 120-130 mg / dL    Any episodes of hypoglycemia? Yes (rarely)  Hypoglycemia awareness? Yes      Pertinent PMH Review:  - PMH of Pancreatitis: No  - PMH/FH of Medullary Thyroid Cancer: No  - PMH/FH of Multiple Endocrine Neoplasia (MEN) Type II: No  - PMH of Retinopathy: Yes  - PMH of Urinary Tract Infections/Yeast Infections: Yes    Lab Review  Lab Results   Component Value Date    BILITOT 0.6 04/23/2024    CALCIUM 9.3 04/23/2024    CO2 27 04/23/2024     04/23/2024    CREATININE 0.92 04/23/2024    GLUCOSE 88 04/23/2024    ALKPHOS 102 04/23/2024    K  4.5 04/23/2024    PROT 7.1 04/23/2024     04/23/2024    AST 19 04/23/2024    ALT 32 04/23/2024    BUN 21 04/23/2024    ANIONGAP 13 04/23/2024    MG 2.29 01/23/2024    PHOS 4.5 01/23/2024    ALBUMIN 3.9 04/23/2024    LIPASE 8 (L) 06/20/2019    GFRF 72 05/18/2023     Lab Results   Component Value Date    TRIG 138 05/16/2024    CHOL 169 05/16/2024    LDLCALC 93 05/16/2024    HDL 48.4 05/16/2024     Lab Results   Component Value Date    HGBA1C 8.5 (H) 04/23/2024     The 10-year ASCVD risk score (Rosa CARLOS, et al., 2019) is: 24.3%    Values used to calculate the score:      Age: 63 years      Sex: Female      Is Non- : Yes      Diabetic: Yes      Tobacco smoker: No      Systolic Blood Pressure: 154 mmHg      Is BP treated: Yes      HDL Cholesterol: 48.4 mg/dL      Total Cholesterol: 169 mg/dL      Assessment/Plan     Problem List Items Addressed This Visit       HLD (hyperlipidemia)     Patient currently on Atorvastatin 40 mg   Patient needs more lipid control  Patient agreed to work on lifestyle modifications  Exercise / walk more  Healthier diet  ( less fried food / chips / fat rich protein and food)  Weight loss  Re-evaluate in 3-4 months         Relevant Orders    Follow Up In [facility] - Pharmacy    Type 2 diabetes mellitus with proliferative diabetic retinopathy with macular edema, bilateral (Multi)     Current assessment:  Patient have been out of Trulicity for 2 months due to national back order  Patient HbA1c increased from 8.1 % to 8.5 % in the last 4 months  Patient generally trying to have healthy diet    Plan:  Patient to follow the following diet tips:  Replace white bread / rice  / pasta with whole wheat or grain ones  Eat less fried food and more baked  Reduce the carbohydrates / potatoes intake  Minimize the potato chips in diet as well as to drink more water  Patient also to exercise and be more active  Patient will be started on Mounjaro 2.5 mg / 0.5 mL  The  evaluation of patient progress / glycemic control and kidney function for increasing Jardiance dose from 10 mg to 25 mg.  Patient to reduce Semglee dose from 42 units BID to 40 units BID after initiating the first Mounjaro dose         Relevant Orders    Follow Up In [facility] - Pharmacy    Asthma ([facility])     Patient does not have Asthma  Patient was prescribed Albuterol inhaler when she had severe Bronchitis , to help her breath better and lessen the cough last year 3-4 times and never used it again         Relevant Orders    Follow Up In [facility] - Pharmacy     Mounjaro Education:     - Counseled patient on MOA, expectations, side effects, duration of therapy, contraindications, administration, and monitoring parameters  - Answered all patient questions and concerns  - Counseled patient on Mounjaro MOA, expectations, side effects, duration of therapy, administration, and monitoring parameters.  - Provided detailed dosing and administration counseling to ensure proper technique.   - Reviewed Mounjaro titration schedule, starting with 2.5 mg once weekly to a goal of 15 mg once weekly if tolerated  - Counseled patient on the benefits of GLP-1ra glycemic control and weight loss  - Reviewed storage requirements of Mounjaro when not in use, and when to administer the medication if a dose is missed.  - Advised patient that they may experience improved satiety after meals and portion sizes of meals may be reduced as doses of Mounjaro increase.     Type 2 diabetes mellitus, is not at goal 8.5. Goal A1C: <7%    Follow up: I recommend diabetes care be 2 weeks.    KAREN Alaniz.Sc, BCPS, BCMTMS, [facility]  PGY1 Pharmacy Resident  [phone]    Continue all meds under the continuation of care with the referring provider and clinical pharmacy team

## 2024-06-04 NOTE — ASSESSMENT & PLAN NOTE
Patient currently on Atorvastatin 40 mg   Patient needs more lipid control  Patient agreed to work on lifestyle modifications  Exercise / walk more  Healthier diet  ( less fried food / chips / fat rich protein and food)  Weight loss  Re-evaluate in 3-4 months

## 2024-06-04 NOTE — ASSESSMENT & PLAN NOTE
Current assessment:  Patient have been out of Trulicity for 2 months due to national back order  Patient HbA1c increased from 8.1 % to 8.5 % in the last 4 months  Patient generally trying to have healthy diet    Plan:  Patient to follow the following diet tips:  Replace white bread / rice  / pasta with whole wheat or grain ones  Eat less fried food and more baked  Reduce the carbohydrates / potatoes intake  Minimize the potato chips in diet as well as to drink more water  Patient also to exercise and be more active  Patient will be started on Mounjaro 2.5 mg / 0.5 mL  The evaluation of patient progress / glycemic control and kidney function for increasing Jardiance dose from 10 mg to 25 mg.  Patient to reduce Semglee dose from 42 units BID to 40 units BID after initiating the first Mounjaro dose

## 2024-06-04 NOTE — ASSESSMENT & PLAN NOTE
Patient does not have Asthma  Patient was prescribed Albuterol inhaler when she had severe Bronchitis , to help her breath better and lessen the cough last year 3-4 times and never used it again

## 2024-06-17 ENCOUNTER — HOSPITAL ENCOUNTER (OUTPATIENT)
Dept: RADIOLOGY | Facility: HOSPITAL | Age: 64
Discharge: HOME | End: 2024-06-17
Payer: COMMERCIAL

## 2024-06-17 DIAGNOSIS — F07.81 POST CONCUSSION SYNDROME: ICD-10-CM

## 2024-06-17 PROCEDURE — 70551 MRI BRAIN STEM W/O DYE: CPT

## 2024-06-17 PROCEDURE — 70551 MRI BRAIN STEM W/O DYE: CPT | Performed by: RADIOLOGY

## 2024-06-18 ENCOUNTER — APPOINTMENT (OUTPATIENT)
Dept: PHARMACY | Facility: HOSPITAL | Age: 64
End: 2024-06-18
Payer: COMMERCIAL

## 2024-06-18 DIAGNOSIS — E78.5 HYPERLIPIDEMIA, UNSPECIFIED HYPERLIPIDEMIA TYPE: ICD-10-CM

## 2024-06-18 DIAGNOSIS — J45.909 ASTHMA, UNSPECIFIED ASTHMA SEVERITY, UNSPECIFIED WHETHER COMPLICATED, UNSPECIFIED WHETHER PERSISTENT (HHS-HCC): ICD-10-CM

## 2024-06-18 DIAGNOSIS — E11.3513 TYPE 2 DIABETES MELLITUS WITH BOTH EYES AFFECTED BY PROLIFERATIVE RETINOPATHY AND MACULAR EDEMA, WITHOUT LONG-TERM CURRENT USE OF INSULIN (MULTI): ICD-10-CM

## 2024-06-18 RX ORDER — TIRZEPATIDE 5 MG/.5ML
5 INJECTION, SOLUTION SUBCUTANEOUS
Qty: 2 ML | Refills: 1 | Status: SHIPPED | OUTPATIENT
Start: 2024-06-23

## 2024-06-18 NOTE — ASSESSMENT & PLAN NOTE
Patient currently on Atorvastatin 40 mg   Patient needs more lipid control  Patient agreed to work on lifestyle modifications  Exercise / walk more  Healthier diet  ( less fried food / chips / fat rich protein and food)  Weight loss  Having more Salad and less carbohydrates  Re-evaluate in 3-4 months

## 2024-06-18 NOTE — ASSESSMENT & PLAN NOTE
Current assessment:  Patient currently doing well on Mounjaro 2.5 mg / 0.5 mL  Patient HbA1c increased from 8.1 % to 8.5 % in the last 4 months  Patient generally trying to have healthy diet  Patient started cutting on Carbs and having more Salad  Patient is drinking more water  She just lost one of her close friends last Wednesday    Plan:  Patient to follow the following diet tips:  Replace white bread / rice  / pasta with whole wheat or grain ones  Eat less fried food and more baked  Reduce the carbohydrates / potatoes intake  Minimize the potato chips in diet as well as to drink more water  Patient also to exercise and be more active  Patient continue on her last 2 doses of Mounjaro 2.5 mg / 0.5 mL and then will start on the new script that will be sent for Mounjaro 5 mg / 0.5 mL as planned  The evaluation of patient progress / glycemic control and kidney function for increasing Jardiance dose from 10 mg to 25 mg.  Patient to continue Semglee dose of 40 units BID with a plan to reduce it to 36 units BID if Blood glucose levels are stable  Plan also to increase Mounjaro doses for future and maybe Jardiance 10 mg to 25 mg to lower insulin doses and with target plan to stop insulin if it is well controlled later on

## 2024-06-18 NOTE — ASSESSMENT & PLAN NOTE
Patient does not have Asthma  Patient was prescribed Albuterol inhaler when she had severe Bronchitis , to help her breath better and lessen the cough last year 3-4 times and never used it again  Patient has no asthma symptoms

## 2024-06-18 NOTE — PROGRESS NOTES
Subjective     Patient ID: Elina Garcia is a 63 y.o. female who presents for Diabetes.    Referring Provider: Bibi Bridges DO     Diabetes  She presents for her follow-up diabetic visit. She has type 2 diabetes mellitus. There are no hypoglycemic associated symptoms. There are no hypoglycemic complications. Risk factors for coronary artery disease include diabetes mellitus, dyslipidemia, obesity, hypertension and post-menopausal. Current diabetic treatment includes insulin injections and oral agent (monotherapy).       Diet: Breakfast: Metamucil / Franco / eggs  Lunch: Protein pack / sugar free jello or applesauce  Dinner: Protein pork or beef / vegetables / sometimes potato fries / sometimes rice  Snacks: potato chips / [employer]ik[employer] yogurt    Exercise: Walks everyday /  basket ball to kids occasionally    Allergies   Allergen Reactions    Metformin Unknown       Objective     Current DM Pharmacotherapy:   Jardiance 10 mg: Take 1 tablet (10 mg) by mouth once daily   Semglee Insulin:  INJECT 40 UNITS UNDER THE SKIN EVERY MORNING AND 42 UNITS EVERY EVENING   Novolog Flexpen U-100 Insulin: INJECT PER SLIDING SCALE WITH MEALS. UP TO 40 UNITS PER DAY.   Mounjaro 2.5 mg / 0.5 mL: inject 2.5 mg under the skin 1 (one) time per week     SECONDARY PREVENTION  - Statin? Yes  - ACE-I/ARB? No  - Aspirin? Yes    Current monitoring regimen:   Patient is using: continuous glucose monitor    Testing frequency: 2-3 times    SMBG Readings: -109 mg / dL  PPBG 240-250 mg / dL     Any episodes of hypoglycemia? Yes (rarely)  Hypoglycemia awareness? Yes      Pertinent PMH Review:  - PMH of Pancreatitis: No  - PMH/FH of Medullary Thyroid Cancer: No  - PMH/FH of Multiple Endocrine Neoplasia (MEN) Type II: No  - PMH of Retinopathy: Yes  - PMH of Urinary Tract Infections/Yeast Infections: Yes    Lab Review  Lab Results   Component Value Date    BILITOT 0.6 04/23/2024    CALCIUM 9.3 04/23/2024    CO2 27 04/23/2024      04/23/2024    CREATININE 0.92 04/23/2024    GLUCOSE 88 04/23/2024    ALKPHOS 102 04/23/2024    K 4.5 04/23/2024    PROT 7.1 04/23/2024     04/23/2024    AST 19 04/23/2024    ALT 32 04/23/2024    BUN 21 04/23/2024    ANIONGAP 13 04/23/2024    MG 2.29 01/23/2024    PHOS 4.5 01/23/2024    ALBUMIN 3.9 04/23/2024    LIPASE 8 (L) 06/20/2019    GFRF 72 05/18/2023     Lab Results   Component Value Date    TRIG 138 05/16/2024    CHOL 169 05/16/2024    LDLCALC 93 05/16/2024    HDL 48.4 05/16/2024     Lab Results   Component Value Date    HGBA1C 8.5 (H) 04/23/2024     The 10-year ASCVD risk score (Rosa CARLOS, et al., 2019) is: 24.3%    Values used to calculate the score:      Age: 63 years      Sex: Female      Is Non- : Yes      Diabetic: Yes      Tobacco smoker: No      Systolic Blood Pressure: 154 mmHg      Is BP treated: Yes      HDL Cholesterol: 48.4 mg/dL      Total Cholesterol: 169 mg/dL      Assessment/Plan     Problem List Items Addressed This Visit       HLD (hyperlipidemia)     Patient currently on Atorvastatin 40 mg   Patient needs more lipid control  Patient agreed to work on lifestyle modifications  Exercise / walk more  Healthier diet  ( less fried food / chips / fat rich protein and food)  Weight loss  Having more Salad and less carbohydrates  Re-evaluate in 3-4 months         Relevant Orders    Follow Up In [facility] - Pharmacy    Type 2 diabetes mellitus with proliferative diabetic retinopathy with macular edema, bilateral (Multi)     Current assessment:  Patient currently doing well on Mounjaro 2.5 mg / 0.5 mL  Patient HbA1c increased from 8.1 % to 8.5 % in the last 4 months  Patient generally trying to have healthy diet  Patient started cutting on Carbs and having more Salad  Patient is drinking more water  She just lost one of her close friends last Wednesday    Plan:  Patient to follow the following diet tips:  Replace white bread / rice  / pasta with whole wheat  or grain ones  Eat less fried food and more baked  Reduce the carbohydrates / potatoes intake  Minimize the potato chips in diet as well as to drink more water  Patient also to exercise and be more active  Patient continue on her last 2 doses of Mounjaro 2.5 mg / 0.5 mL and then will start on the new script that will be sent for Mounjaro 5 mg / 0.5 mL as planned  The evaluation of patient progress / glycemic control and kidney function for increasing Jardiance dose from 10 mg to 25 mg.  Patient to continue Semglee dose of 40 units BID with a plan to reduce it to 36 units BID if Blood glucose levels are stable  Plan also to increase Mounjaro doses for future and maybe Jardiance 10 mg to 25 mg to lower insulin doses and with target plan to stop insulin if it is well controlled later on         Relevant Orders    Follow Up In [facility] - Pharmacy    Asthma ([facility])     Patient does not have Asthma  Patient was prescribed Albuterol inhaler when she had severe Bronchitis , to help her breath better and lessen the cough last year 3-4 times and never used it again  Patient has no asthma symptoms         Relevant Orders    Follow Up In [facility] - Pharmacy       Type 2 diabetes mellitus, is not at goal 8.5. Goal A1C: <7%    Follow up: I recommend diabetes care be 4 weeks.    Rodger Alaniz, BCPS, BCMTMS, [facility]  PGY1 Pharmacy Resident  [phone]    Continue all meds under the continuation of care with the referring provider and clinical pharmacy team

## 2024-06-26 ENCOUNTER — OFFICE VISIT (OUTPATIENT)
Dept: UROLOGY | Facility: HOSPITAL | Age: 64
End: 2024-06-26
Payer: COMMERCIAL

## 2024-06-26 DIAGNOSIS — R39.15 URGENCY OF URINATION: ICD-10-CM

## 2024-06-26 DIAGNOSIS — R32 URINARY INCONTINENCE, UNSPECIFIED TYPE: Primary | ICD-10-CM

## 2024-06-26 DIAGNOSIS — R35.0 URINARY FREQUENCY: ICD-10-CM

## 2024-06-26 LAB
POC APPEARANCE, URINE: CLEAR
POC BILIRUBIN, URINE: NEGATIVE
POC BLOOD, URINE: NEGATIVE
POC COLOR, URINE: YELLOW
POC GLUCOSE, URINE: ABNORMAL MG/DL
POC KETONES, URINE: NEGATIVE MG/DL
POC LEUKOCYTES, URINE: NEGATIVE
POC NITRITE,URINE: NEGATIVE
POC PH, URINE: 6 PH
POC PROTEIN, URINE: ABNORMAL MG/DL
POC SPECIFIC GRAVITY, URINE: 1.01
POC UROBILINOGEN, URINE: 0.2 EU/DL

## 2024-06-26 PROCEDURE — 99213 OFFICE O/P EST LOW 20 MIN: CPT | Performed by: NURSE PRACTITIONER

## 2024-06-26 PROCEDURE — 3052F HG A1C>EQUAL 8.0%<EQUAL 9.0%: CPT | Performed by: NURSE PRACTITIONER

## 2024-06-26 PROCEDURE — G2211 COMPLEX E/M VISIT ADD ON: HCPCS | Performed by: NURSE PRACTITIONER

## 2024-06-26 PROCEDURE — 51798 US URINE CAPACITY MEASURE: CPT | Performed by: NURSE PRACTITIONER

## 2024-06-26 PROCEDURE — 99203 OFFICE O/P NEW LOW 30 MIN: CPT | Performed by: NURSE PRACTITIONER

## 2024-06-26 PROCEDURE — 3008F BODY MASS INDEX DOCD: CPT | Performed by: NURSE PRACTITIONER

## 2024-06-26 PROCEDURE — 3048F LDL-C <100 MG/DL: CPT | Performed by: NURSE PRACTITIONER

## 2024-06-26 PROCEDURE — 81003 URINALYSIS AUTO W/O SCOPE: CPT | Performed by: NURSE PRACTITIONER

## 2024-06-26 PROCEDURE — 3062F POS MACROALBUMINURIA REV: CPT | Performed by: NURSE PRACTITIONER
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
## 2024-06-27 ENCOUNTER — HOSPITAL ENCOUNTER (OUTPATIENT)
Dept: RADIOLOGY | Facility: CLINIC | Age: 64
Discharge: HOME | End: 2024-06-27
Payer: COMMERCIAL

## 2024-06-27 VITALS — HEIGHT: 68 IN | WEIGHT: 229.28 LBS | BODY MASS INDEX: 34.75 KG/M2

## 2024-06-27 DIAGNOSIS — Z12.31 ENCOUNTER FOR SCREENING MAMMOGRAM FOR BREAST CANCER: ICD-10-CM

## 2024-06-27 PROCEDURE — 77067 SCR MAMMO BI INCL CAD: CPT

## 2024-07-16 ENCOUNTER — APPOINTMENT (OUTPATIENT)
Dept: PHARMACY | Facility: HOSPITAL | Age: 64
End: 2024-07-16
Payer: COMMERCIAL

## 2024-07-16 DIAGNOSIS — J45.909 ASTHMA, UNSPECIFIED ASTHMA SEVERITY, UNSPECIFIED WHETHER COMPLICATED, UNSPECIFIED WHETHER PERSISTENT (HHS-HCC): ICD-10-CM

## 2024-07-16 DIAGNOSIS — E11.3513 TYPE 2 DIABETES MELLITUS WITH BOTH EYES AFFECTED BY PROLIFERATIVE RETINOPATHY AND MACULAR EDEMA, WITHOUT LONG-TERM CURRENT USE OF INSULIN (MULTI): ICD-10-CM

## 2024-07-16 DIAGNOSIS — E11.65 POORLY CONTROLLED DIABETES MELLITUS (MULTI): ICD-10-CM

## 2024-07-16 DIAGNOSIS — E78.5 HYPERLIPIDEMIA, UNSPECIFIED HYPERLIPIDEMIA TYPE: ICD-10-CM

## 2024-07-16 PROCEDURE — RXMED WILLOW AMBULATORY MEDICATION CHARGE

## 2024-07-16 RX ORDER — BLOOD-GLUCOSE SENSOR
EACH MISCELLANEOUS
Qty: 9 EACH | Refills: 3 | Status: SHIPPED | OUTPATIENT
Start: 2024-07-16

## 2024-07-16 RX ORDER — TIRZEPATIDE 7.5 MG/.5ML
7.5 INJECTION, SOLUTION SUBCUTANEOUS
Qty: 2 ML | Refills: 1 | Status: SHIPPED | OUTPATIENT
Start: 2024-07-16

## 2024-07-16 ASSESSMENT — ENCOUNTER SYMPTOMS
POLYPHAGIA: 1
FATIGUE: 1

## 2024-07-16 NOTE — ASSESSMENT & PLAN NOTE
Recent BG very uncontrolled. Reports many readings in 300 mg/dL range.     Increase to Mounjaro 7.5 mg/0.5 mL - once weekly     Change Semglee to 48 units twice daily    Continue to take Novolog 14 units three times daily with meals, advised to take prior to eating. Advised to talk to endocrinologist about sliding scale.     Continue Jardiance 10 mg once daily     Consider re-trial with ARB given proteinuria

## 2024-07-16 NOTE — PROGRESS NOTES
"  Clinical Pharmacy Appointment  Subjective     Patient ID: Elina Garcia is a 64 y.o. female who presents for Diabetes.    Referring Provider: Bibi Bridges,      Patient reports BG was going well but this past week has been very elevated. Not feeling good overall, hungrier and increase in urination.    Diabetes  She presents for her follow-up diabetic visit. She has type 2 diabetes mellitus. Her disease course has been fluctuating. There are no hypoglycemic associated symptoms. Associated symptoms include fatigue, polyphagia and polyuria. There are no hypoglycemic complications. She is compliant with treatment most of the time.       Diet: 24 hr recall:   Breakfast: metamucil  Morning snack: very small piece of lasagna 3\"x2\"  Lunch: 1 slice of bread wheat swirl (1/2 sandwich with lerma and mustard, deli buffalo chicken)   4 PM: Soup (chicken noodle) and other half of sandwich     Exercise: not assessed today     Allergies   Allergen Reactions    Metformin Unknown       Objective     Current DM Pharmacotherapy:   Jardiance 10 mg - 1 tablet daily   Novolog Flex Pen - written to take per SSI up to 40 units daily  Semglee 100 u/mL Pen- written to take 45 units in the morning and 50 units in the evening   Mounjaro 5 mg/0.5 mL - once weekly     Clarifications to above regimen: Patient has been taking Novolog 14 units after meals for years. States she does not know what a sliding scale is and has never been on one. Additionally has been taking Semglee 48 units if BG >250 mg/dL prior to dose and 42 units if less than that.   Adverse Effects: None    SECONDARY PREVENTION  - Statin? Yes  Does pt have proteinuria? Yes If appropriate, is patient on ACEi/ARB? No, Cough from lisinopril  - Aspirin? Yes    Current monitoring regimen:   Patient is using: continuous glucose monitor    Testing frequency: CGM    SMBG Readings: 27% TIR past 5 days. FBG today 198 mg/dL     Any episodes of hypoglycemia? No,   .  Did patient treat " episode of hypoglycemia appropriately? N/A  Does the patient have a prescription for ready-to-use Glucagon? No        Pertinent PMH Review:  - PMH of Pancreatitis: No  - PMH/FH of Medullary Thyroid Cancer: No  - PMH/FH of Multiple Endocrine Neoplasia (MEN) Type II: No  - PMH of Retinopathy: Yes  - PMH of Urinary Tract Infections/Yeast Infections: No    Lab Review  BMP  Lab Results   Component Value Date    CALCIUM 9.3 04/23/2024     04/23/2024    K 4.5 04/23/2024    CO2 27 04/23/2024     04/23/2024    BUN 21 04/23/2024    CREATININE 0.92 04/23/2024    EGFR 71 04/23/2024     LFTs  Lab Results   Component Value Date    ALT 32 04/23/2024    AST 19 04/23/2024    ALKPHOS 102 04/23/2024    BILITOT 0.6 04/23/2024     FLP  Lab Results   Component Value Date    TRIG 138 05/16/2024    CHOL 169 05/16/2024    LDLF 114 (H) 02/16/2023    LDLCALC 93 05/16/2024    HDL 48.4 05/16/2024       The 10-year ASCVD risk score (Rosa CARLOS, et al., 2019) is: 25%    Values used to calculate the score:      Age: 64 years      Sex: Female      Is Non- : Yes      Diabetic: Yes      Tobacco smoker: No      Systolic Blood Pressure: 154 mmHg      Is BP treated: Yes      HDL Cholesterol: 48.4 mg/dL      Total Cholesterol: 169 mg/dL  Urine Microalbumin  Lab Results   Component Value Date    MICROALBCREA 466.6 (H) 05/16/2024     Weight Management  Wt Readings from Last 3 Encounters:   06/27/24 104 kg (229 lb 4.5 oz)   06/03/24 104 kg (230 lb)   05/16/24 105 kg (232 lb 8 oz)      There is no height or weight on file to calculate BMI.   A1C  Lab Results   Component Value Date    HGBA1C 8.5 (H) 04/23/2024    HGBA1C 8.1 (H) 01/23/2024    HGBA1C 8.7 (H) 10/23/2023         Assessment/Plan     Problem List Items Addressed This Visit       HLD (hyperlipidemia)    Poorly controlled diabetes mellitus (Multi)    Type 2 diabetes mellitus with proliferative diabetic retinopathy with macular edema, bilateral (Multi)     Recent BG  very uncontrolled. Reports many readings in 300 mg/dL range.     Increase to Mounjaro 7.5 mg/0.5 mL - once weekly     Change Semglee to 48 units twice daily    Continue to take Novolog 14 units three times daily with meals, advised to take prior to eating. Advised to talk to endocrinologist about sliding scale.     Continue Jardiance 10 mg once daily     Consider re-trial with ARB given proteinuria          Ast[facility] ([facility])       Type 2 diabetes mellitus, is not at goal. Goal A1C: <7%    Follow up: I recommend diabetes care be 4 weeks.  Follow-up with endocrinology on 7/23/24    Aurora CohenD [facility]  Clinical Pharmacy Specialist, Primary Care     Continue all meds under the continuation of care with the referring provider and clinical pharmacy team

## 2024-07-17 ENCOUNTER — APPOINTMENT (OUTPATIENT)
Dept: GASTROENTEROLOGY | Facility: CLINIC | Age: 64
End: 2024-07-17
Payer: COMMERCIAL

## 2024-07-18 ENCOUNTER — PHARMACY VISIT (OUTPATIENT)
Dept: PHARMACY | Facility: CLINIC | Age: 64
End: 2024-07-18
Payer: COMMERCIAL

## 2024-07-22 ENCOUNTER — APPOINTMENT (OUTPATIENT)
Dept: NEUROSURGERY | Facility: CLINIC | Age: 64
End: 2024-07-22
Payer: COMMERCIAL

## 2024-07-22 VITALS
RESPIRATION RATE: 20 BRPM | SYSTOLIC BLOOD PRESSURE: 140 MMHG | BODY MASS INDEX: 35.16 KG/M2 | HEIGHT: 68 IN | HEART RATE: 80 BPM | DIASTOLIC BLOOD PRESSURE: 80 MMHG | WEIGHT: 232 LBS

## 2024-07-22 DIAGNOSIS — F07.81 POST CONCUSSION SYNDROME: Primary | ICD-10-CM

## 2024-07-22 PROCEDURE — 3079F DIAST BP 80-89 MM HG: CPT | Performed by: NEUROLOGICAL SURGERY

## 2024-07-22 PROCEDURE — 3048F LDL-C <100 MG/DL: CPT | Performed by: NEUROLOGICAL SURGERY

## 2024-07-22 PROCEDURE — 3062F POS MACROALBUMINURIA REV: CPT | Performed by: NEUROLOGICAL SURGERY

## 2024-07-22 PROCEDURE — 3077F SYST BP >= 140 MM HG: CPT | Performed by: NEUROLOGICAL SURGERY

## 2024-07-22 PROCEDURE — 3052F HG A1C>EQUAL 8.0%<EQUAL 9.0%: CPT | Performed by: NEUROLOGICAL SURGERY

## 2024-07-22 PROCEDURE — 1036F TOBACCO NON-USER: CPT | Performed by: NEUROLOGICAL SURGERY

## 2024-07-22 PROCEDURE — 3008F BODY MASS INDEX DOCD: CPT | Performed by: NEUROLOGICAL SURGERY

## 2024-07-22 PROCEDURE — 99212 OFFICE O/P EST SF 10 MIN: CPT | Performed by: NEUROLOGICAL SURGERY

## 2024-07-22 ASSESSMENT — PAIN SCALES - GENERAL: PAINLEVEL: 6
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
## 2024-07-23 ENCOUNTER — APPOINTMENT (OUTPATIENT)
Dept: ENDOCRINOLOGY | Facility: CLINIC | Age: 64
End: 2024-07-23
Payer: COMMERCIAL

## 2024-07-23 VITALS
DIASTOLIC BLOOD PRESSURE: 70 MMHG | HEIGHT: 68 IN | SYSTOLIC BLOOD PRESSURE: 124 MMHG | RESPIRATION RATE: 16 BRPM | WEIGHT: 237 LBS | HEART RATE: 91 BPM | BODY MASS INDEX: 35.92 KG/M2

## 2024-07-23 DIAGNOSIS — E78.5 HYPERLIPIDEMIA, UNSPECIFIED HYPERLIPIDEMIA TYPE: ICD-10-CM

## 2024-07-23 DIAGNOSIS — E11.65 POORLY CONTROLLED DIABETES MELLITUS (MULTI): Primary | ICD-10-CM

## 2024-07-23 DIAGNOSIS — I10 ESSENTIAL HYPERTENSION: ICD-10-CM

## 2024-07-23 LAB — POC HEMOGLOBIN A1C: 8.5 % (ref 4.2–6.5)

## 2024-07-23 PROCEDURE — 3008F BODY MASS INDEX DOCD: CPT | Performed by: INTERNAL MEDICINE

## 2024-07-23 PROCEDURE — 3048F LDL-C <100 MG/DL: CPT | Performed by: INTERNAL MEDICINE

## 2024-07-23 PROCEDURE — 3078F DIAST BP <80 MM HG: CPT | Performed by: INTERNAL MEDICINE

## 2024-07-23 PROCEDURE — 83036 HEMOGLOBIN GLYCOSYLATED A1C: CPT | Performed by: INTERNAL MEDICINE

## 2024-07-23 PROCEDURE — 3052F HG A1C>EQUAL 8.0%<EQUAL 9.0%: CPT | Performed by: INTERNAL MEDICINE

## 2024-07-23 PROCEDURE — 1036F TOBACCO NON-USER: CPT | Performed by: INTERNAL MEDICINE

## 2024-07-23 PROCEDURE — 99214 OFFICE O/P EST MOD 30 MIN: CPT | Performed by: INTERNAL MEDICINE

## 2024-07-23 PROCEDURE — 3062F POS MACROALBUMINURIA REV: CPT | Performed by: INTERNAL MEDICINE

## 2024-07-23 PROCEDURE — 3074F SYST BP LT 130 MM HG: CPT | Performed by: INTERNAL MEDICINE

## 2024-07-23 ASSESSMENT — ENCOUNTER SYMPTOMS
PALPITATIONS: 0
FEVER: 0
CHILLS: 0
VOMITING: 0
DIARRHEA: 0
FATIGUE: 0
HEADACHES: 0
COUGH: 0
NAUSEA: 0
SHORTNESS OF BREATH: 0

## 2024-07-23 NOTE — ASSESSMENT & PLAN NOTE
Orders:    POCT glycosylated hemoglobin (Hb A1C) manually resulted  discussed course  A1c today  Given share code for dexcom  Continue to titrate mounjaro with pharmacy program  Asked her to message in a few weeks to review data: must keep  phone in range. Today too many holes in data to determine changes

## 2024-07-23 NOTE — PATIENT INSTRUCTIONS
A1c today  Message in a few weeks to review clarity data  Keep phone in range  Follow up in 3 months
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
## 2024-07-24 ENCOUNTER — APPOINTMENT (OUTPATIENT)
Dept: PRIMARY CARE | Facility: CLINIC | Age: 64
End: 2024-07-24
Payer: COMMERCIAL

## 2024-07-24 VITALS
TEMPERATURE: 97.6 F | RESPIRATION RATE: 15 BRPM | HEIGHT: 68 IN | OXYGEN SATURATION: 99 % | SYSTOLIC BLOOD PRESSURE: 138 MMHG | HEART RATE: 78 BPM | DIASTOLIC BLOOD PRESSURE: 76 MMHG | WEIGHT: 236.3 LBS | BODY MASS INDEX: 35.81 KG/M2

## 2024-07-24 DIAGNOSIS — E66.01 CLASS 2 SEVERE OBESITY DUE TO EXCESS CALORIES WITH SERIOUS COMORBIDITY AND BODY MASS INDEX (BMI) OF 35.0 TO 35.9 IN ADULT (MULTI): ICD-10-CM

## 2024-07-24 DIAGNOSIS — E78.5 HYPERLIPIDEMIA, UNSPECIFIED HYPERLIPIDEMIA TYPE: ICD-10-CM

## 2024-07-24 DIAGNOSIS — I10 ESSENTIAL HYPERTENSION: Primary | ICD-10-CM

## 2024-07-24 PROBLEM — Z86.39 HISTORY OF TYPE 2 DIABETES MELLITUS: Status: RESOLVED | Noted: 2024-05-16 | Resolved: 2024-07-24

## 2024-07-24 PROCEDURE — 3048F LDL-C <100 MG/DL: CPT | Performed by: FAMILY MEDICINE

## 2024-07-24 PROCEDURE — 3062F POS MACROALBUMINURIA REV: CPT | Performed by: FAMILY MEDICINE

## 2024-07-24 PROCEDURE — 99213 OFFICE O/P EST LOW 20 MIN: CPT | Performed by: FAMILY MEDICINE

## 2024-07-24 PROCEDURE — 3052F HG A1C>EQUAL 8.0%<EQUAL 9.0%: CPT | Performed by: FAMILY MEDICINE

## 2024-07-24 PROCEDURE — 3008F BODY MASS INDEX DOCD: CPT | Performed by: FAMILY MEDICINE

## 2024-07-24 PROCEDURE — 1036F TOBACCO NON-USER: CPT | Performed by: FAMILY MEDICINE

## 2024-07-24 PROCEDURE — 3078F DIAST BP <80 MM HG: CPT | Performed by: FAMILY MEDICINE

## 2024-07-24 PROCEDURE — 3075F SYST BP GE 130 - 139MM HG: CPT | Performed by: FAMILY MEDICINE

## 2024-07-24 ASSESSMENT — PATIENT HEALTH QUESTIONNAIRE - PHQ9
SUM OF ALL RESPONSES TO PHQ9 QUESTIONS 1 AND 2: 0
2. FEELING DOWN, DEPRESSED OR HOPELESS: NOT AT ALL
1. LITTLE INTEREST OR PLEASURE IN DOING THINGS: NOT AT ALL

## 2024-07-24 NOTE — PATIENT INSTRUCTIONS
*Please accept my condolences for the loss of your loved one*    HTN  - Your blood pressure is at goal today- goal is less than 130/80  - Continue your current medications  - Weight loss can help lower your bp!  Work on a healthy whole food diet and add at least 30min of exercise 5 days per week  - Work on a low salt diet      Dm  Increase the  mounjaro as directed        Please follow up in NOV as scheduled or as needed.       ** If labs or imaging ordered at today's visit, all the non-urgent results will be discussed at your next visit    If you have been referred for a special test or to a specialist please call  [phone] to schedule an appointment.  If you have any further questions, or if develop new or worsened symptoms, please give our office a call at [phone].    - wt loss will further help your DM and HTN

## 2024-07-24 NOTE — PROGRESS NOTES
"Subjective   Patient ID: Elina Garcia is a 64 y.o. female who presents for Follow-up (Pt is here for HTN fuv.).    HPI       HTN  BP Near at goal today in office.  Using medications without issues.  Denies CP, SOB, palpitations, change in vision, dizziness, N/V.    DM  Hba1c 8.5  Using mounjaro      Review of Systems  All systems reviewed and neg if not noted in the HPI above       Objective   /76 (Patient Position: Sitting)   Pulse 78   Temp 36.4 °C (97.6 °F)   Resp 15   Ht 1.727 m (5' 8\")   Wt 107 kg (236 lb 4.8 oz)   SpO2 99%   BMI 35.93 kg/m²     Physical Exam  Pleasant  Eyes: conjunctiva non-icteric and eye lids are without obvious rash or drooping. Pupils are symmetric.   Ears, Nose, Mouth, and Throat: External ears and nose appear to be without deformity or rash. No lesions or masses noted. Hearing is grossly intact.   CV: RRR, no murmur  Pulm:CTA B/L  Abd: soft, NTTP, + BS  LE: no edema  Psychiatric: Alert, orientation to person, place, and time. Recent/remote memory as evidenced through face-to-face interaction and discussion appear grossly intact. Mood and affect are normal.      Assessment/Plan   Problem List Items Addressed This Visit             ICD-10-CM    Essential hypertension - Primary I10    HLD (hyperlipidemia) E78.5    Class 2 severe obesity due to excess calories with serious comorbidity and body mass index (BMI) of 35.0 to 35.9 in adult (Multi) E66.01, Z68.35         Please follow up in NOV as scheduled or as needed.   \     "

## 2024-07-26 ENCOUNTER — EVALUATION (OUTPATIENT)
Dept: PHYSICAL THERAPY | Facility: CLINIC | Age: 64
End: 2024-07-26
Payer: COMMERCIAL

## 2024-07-26 DIAGNOSIS — R32 URINARY INCONTINENCE, UNSPECIFIED TYPE: ICD-10-CM

## 2024-07-26 PROCEDURE — 97162 PT EVAL MOD COMPLEX 30 MIN: CPT | Mod: GP | Performed by: PHYSICAL THERAPIST

## 2024-07-26 PROCEDURE — 97535 SELF CARE MNGMENT TRAINING: CPT | Mod: GP | Performed by: PHYSICAL THERAPIST
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
## 2024-07-30 DIAGNOSIS — E11.65 POORLY CONTROLLED DIABETES MELLITUS (MULTI): Primary | ICD-10-CM

## 2024-07-30 PROCEDURE — RXMED WILLOW AMBULATORY MEDICATION CHARGE

## 2024-07-30 RX ORDER — BLOOD-GLUCOSE,RECEIVER,CONT
EACH MISCELLANEOUS
Qty: 1 EACH | Refills: 0 | Status: SHIPPED | OUTPATIENT
Start: 2024-07-30

## 2024-07-31 ENCOUNTER — PHARMACY VISIT (OUTPATIENT)
Dept: PHARMACY | Facility: CLINIC | Age: 64
End: 2024-07-31
Payer: COMMERCIAL

## 2024-08-08 ENCOUNTER — TELEPHONE (OUTPATIENT)
Dept: PSYCHOLOGY | Facility: HOSPITAL | Age: 64
End: 2024-08-08
Payer: COMMERCIAL

## 2024-08-08 NOTE — PROGRESS NOTES
Called patient to discuss neuropsychological rehabilitation referral on 8/8/24 and on 8/14/24. Voicemail box is full and I was unable to leave a message.

## 2024-08-10 DIAGNOSIS — E11.65 POORLY CONTROLLED DIABETES MELLITUS (MULTI): ICD-10-CM

## 2024-08-10 RX ORDER — INSULIN ASPART 100 [IU]/ML
INJECTION, SOLUTION INTRAVENOUS; SUBCUTANEOUS
Qty: 3 ML | Refills: 0 | Status: SHIPPED | OUTPATIENT
Start: 2024-08-10

## 2024-08-13 ENCOUNTER — APPOINTMENT (OUTPATIENT)
Dept: PHARMACY | Facility: HOSPITAL | Age: 64
End: 2024-08-13
Payer: COMMERCIAL

## 2024-08-22 PROCEDURE — RXMED WILLOW AMBULATORY MEDICATION CHARGE

## 2024-08-23 ENCOUNTER — PHARMACY VISIT (OUTPATIENT)
Dept: PHARMACY | Facility: CLINIC | Age: 64
End: 2024-08-23
Payer: COMMERCIAL

## 2024-08-30 ENCOUNTER — TREATMENT (OUTPATIENT)
Dept: PHYSICAL THERAPY | Facility: CLINIC | Age: 64
End: 2024-08-30
Payer: COMMERCIAL

## 2024-08-30 DIAGNOSIS — R32 URINARY INCONTINENCE, UNSPECIFIED TYPE: ICD-10-CM

## 2024-08-30 PROCEDURE — 97110 THERAPEUTIC EXERCISES: CPT | Mod: GP | Performed by: PHYSICAL THERAPIST

## 2024-08-30 PROCEDURE — 97112 NEUROMUSCULAR REEDUCATION: CPT | Mod: GP | Performed by: PHYSICAL THERAPIST

## 2024-08-30 PROCEDURE — 97535 SELF CARE MNGMENT TRAINING: CPT | Mod: GP | Performed by: PHYSICAL THERAPIST

## 2024-08-30 NOTE — PROGRESS NOTES
Physical Therapy  Physical Therapy Pelvic Floor    Name: Elina Garcia  MRN: [id number]  : 1960  Today's Date: 24     Time Calculation  Start Time: 08  Stop Time: 09  Time Calculation (min): 60 min    Insurance: Darrell ROBERT, no auth  Visit # 2 of 35 for     Current Problem:  1. Urinary incontinence, unspecified type  Follow Up In Physical Therapy          General     Precautions     Vital Signs     Pain       Subjective  Chief Complaint: urinary urgency and frequency, incontinence  - 4-5 years ago, uncontrollable diarrhea, occurred for 2 years, no urge, Side Effect of Medications, only time it happens at this time is with anxiety  - urinary leaking with coughing and sneezing, lifting, sometimes with walking  - Nocturia: 3-4x  - bilateral wrist pain from a fall - was going to OT/worker's comp, just recently stopped  Onset: 4-5 yrs ago  MONSE: unknown    Today:   - noticing a little more urgency, frequency, incontinence  - finding herself searching for bathrooms  - began decreasing fluids before bed - improved night-time waking 1-2x    PAIN  Intensity (0-10): 4  Location: back pain, shoulder pain, R collar bone  Description: soreness, aching    Treatment Goals: control her bladder more    BLADDER = nocturia, PF tightness & weakness    BOWEL= discuss next visit  Frequency of Bowel Movements: regular  Management Techniques: metamucil      Objective      Patient was educated on pelvic floor anatomy, function, and dysfunction.   Patient was educated on an orthopedic assessment & external pelvic floor assessment technique and verbalized consent.   The patient is aware they have full autonomy and can stop the assessment at any time.     Standing Assessment:   Posture: rounded shoulders, increased thoracic kyphosis, increased lumbar lordosis  Sit to stand: observation, used bilateral UE assist  Gait: compensated trendelenburg bilaterally  SL Stance: <10 bilaterally  DL Squat: decreased depth  Standing  Lumbar Mobility: major tightness all directions    Supine Movement Assessment   SLR: pelvic rocking and doming  Bridge: limited lumbar extension  Hip PROM: mod limitation IR/ER  MMT: sup hip: 4-/5 bilaterally    Supine Mobility Assessment  - Hamstrings: major tightness  - Piriformis: major tightness    Diaphragmatic Breathing: accessory breathing  Rib Palpation/Positioning: flared  Assess Abdomen  Abdominal Palpation: increased abdominal tissue L Lower abdomen  Transverse Abdominus Contraction: poor    Treatments:   Discussed:   - avoid holding breath with transitions (exhale)  - JICing  - healthy bladder habits  - Squeeze before you sneeze  - discussed concern about increased abdominal tissue of LLQ abdomen - recommend discussion with PCP for further consult    Access Code: [email or web address]  STRETCHING  - Supine Lower Trunk Rotation  - 3-5 x weekly - 10-30 reps  - Hooklying Single Knee to Chest Stretch  - 3-5 x weekly - 3 sets - 10 seconds hold  - Supine Pelvic Floor Stretch  - 3-5 x weekly - 3 sets - 5 breaths hold  - Supine Gluteus Stretch  - 3-5 x weekly - 3 sets - 20 seconds hold  - Supine Figure 4 Piriformis Stretch  - 3-5 x weekly - 3 sets - 20 seconds hold  STRENGTH  - Supine Pelvic Tilt  - 3-5 x weekly - 10 reps  - Hooklying Transversus Abdominis Palpation  - 3-5 x weekly - 5-10 reps - 2 breaths hold    Plan for Next Visit: continued assessment  - behavioral training: bladder training, urge suppression, triggers  - discuss what is the pelvic floor and PFPT assessment pieces  - hep: strength/control of PF/hips/core muscles    Assessment: Patient presents with signs and symptoms consistent with urinary incontinence, muscle weakness, abnormal gait, resulting in limited participation in pain-free ADLs and inability to perform at their prior level of function. Pt would benefit from physical therapy to address the impairments found & listed previously in the objective section in order to return to safe and pain-free ADLs  and prior level of function.  - improved knowledge about urinary function  - improved coordination and activation of pelvic floor and deep core    Plan:   Planned Interventions include: therapeutic exercise, self-care home management, manual therapy, therapeutic activities, gait training, neuromuscular coordination, aquatic therapy  Patient and/or family understands and agrees with goals and plan documented: yes  Frequency: 2x/month  Duration: 3-4 months     Jenny Olivares, PT

## 2024-08-31 ENCOUNTER — PHARMACY VISIT (OUTPATIENT)
Dept: PHARMACY | Facility: CLINIC | Age: 64
End: 2024-08-31
Payer: COMMERCIAL

## 2024-08-31 PROCEDURE — RXMED WILLOW AMBULATORY MEDICATION CHARGE

## 2024-08-31 RX ORDER — SULFAMETHOXAZOLE AND TRIMETHOPRIM 800; 160 MG/1; MG/1
TABLET ORAL
Qty: 10 TABLET | Refills: 0 | OUTPATIENT
Start: 2024-08-31

## 2024-09-09 NOTE — PROGRESS NOTES
Clinical Pharmacy Appointment  Subjective     Patient ID: Elina Garcia is a 64 y.o. female who presents for No chief complaint on file. ***    Referring Provider: Bibi Bridges DO     Patient reports BG was going well but this past week has been very elevated. Not feeling good overall, hungrier and increase in urination.    Diabetes  She presents for her follow-up diabetic visit. She has type 2 diabetes mellitus. Her disease course has been fluctuating. There are no hypoglycemic associated symptoms. There are no hypoglycemic complications. She is compliant with treatment most of the time.     Diet: 24 hr recall:   Breakfast:  Morning snack:   Lunch:   4 PM:     Exercise: ***      Allergies   Allergen Reactions    Metformin Unknown       Objective     Current DM Pharmacotherapy:   Jardiance 10 mg - 1 tablet daily   Novolog Flex Pen - Take 40 units daily (supply? Reorder?)  Average Injection Amount:   Semglee 100 u/mL Pen- written to take 45 units in the morning and 50 units in the evening   Current Injections: Morning *** units, Evening *** units  Mounjaro 7.5 mg/0.5 mL - once weekly     Clarifications to above regimen: Patient has been taking Novolog 14 units after meals for years. States she does not know what a sliding scale is and has never been on one. Additionally has been taking Semglee 48 units if BG >250 mg/dL prior to dose and 42 units if less than that.   Adverse Effects: None    SECONDARY PREVENTION  - Statin? Yes  Does pt have proteinuria? Yes If appropriate, is patient on ACEi/ARB? No, Cough from lisinopril  - Aspirin? Yes    Current monitoring regimen:   Patient is using: continuous glucose monitor    Testing frequency: CGM    SMBG Readings: ***% TIR past 5 days. FBG today *** mg/dL     Any episodes of hypoglycemia? No,   .  Did patient treat episode of hypoglycemia appropriately? N/A  Does the patient have a prescription for ready-to-use Glucagon? No        Pertinent PMH Review:  - PMH of  Pancreatitis: No  - PMH/FH of Medullary Thyroid Cancer: No  - PMH/FH of Multiple Endocrine Neoplasia (MEN) Type II: No  - PMH of Retinopathy: Yes  - PMH of Urinary Tract Infections/Yeast Infections: No    Lab Review  BMP  Lab Results   Component Value Date    CALCIUM 9.3 04/23/2024     04/23/2024    K 4.5 04/23/2024    CO2 27 04/23/2024     04/23/2024    BUN 21 04/23/2024    CREATININE 0.92 04/23/2024    EGFR 71 04/23/2024     LFTs  Lab Results   Component Value Date    ALT 32 04/23/2024    AST 19 04/23/2024    ALKPHOS 102 04/23/2024    BILITOT 0.6 04/23/2024     FLP  Lab Results   Component Value Date    TRIG 138 05/16/2024    CHOL 169 05/16/2024    LDLF 114 (H) 02/16/2023    LDLCALC 93 05/16/2024    HDL 48.4 05/16/2024       The 10-year ASCVD risk score (Rosa CARLOS, et al., 2019) is: 19.2%    Values used to calculate the score:      Age: 64 years      Sex: Female      Is Non- : Yes      Diabetic: Yes      Tobacco smoker: No      Systolic Blood Pressure: 138 mmHg      Is BP treated: Yes      HDL Cholesterol: 48.4 mg/dL      Total Cholesterol: 169 mg/dL  Urine Microalbumin  Lab Results   Component Value Date    MICROALBCREA 466.6 (H) 05/16/2024     Weight Management  Wt Readings from Last 3 Encounters:   07/24/24 107 kg (236 lb 4.8 oz)   07/23/24 108 kg (237 lb)   07/22/24 105 kg (232 lb)      There is no height or weight on file to calculate BMI.   A1C  Lab Results   Component Value Date    HGBA1C 8.5 (A) 07/23/2024    HGBA1C 8.5 (H) 04/23/2024    HGBA1C 8.1 (H) 01/23/2024         Assessment/Plan     Problem List Items Addressed This Visit    None    Type 2 diabetes mellitus:  Recent BG ***. Reports many readings in *** mg/dL range.          Increase to Mounjaro *** mg/0.5 mL - once weekly      Change Semglee to 48 units twice daily     Continue to take Novolog 14 units three times daily with meals, advised to take prior to eating. Advised to talk to endocrinologist about sliding  scale.      Continue Jardiance 10 mg once daily     Type 2 diabetes mellitus, is not at goal. Goal A1C: <7%    Follow up: I recommend diabetes care be 4 weeks.    [employer]    Preceptor: Aurora Aguirre PharmD [facility]  Clinical Pharmacy Specialist, Primary Care     Continue all meds under the continuation of care with the referring provider and clinical pharmacy team

## 2024-09-10 ENCOUNTER — APPOINTMENT (OUTPATIENT)
Dept: PHARMACY | Facility: HOSPITAL | Age: 64
End: 2024-09-10
Payer: COMMERCIAL

## 2024-09-10 DIAGNOSIS — E11.3513 TYPE 2 DIABETES MELLITUS WITH BOTH EYES AFFECTED BY PROLIFERATIVE RETINOPATHY AND MACULAR EDEMA, WITHOUT LONG-TERM CURRENT USE OF INSULIN (MULTI): ICD-10-CM

## 2024-09-10 ASSESSMENT — ENCOUNTER SYMPTOMS
SWEATS: 1
DIZZINESS: 1
HEADACHES: 1

## 2024-09-10 NOTE — PROGRESS NOTES
"  Clinical Pharmacy Appointment  Subjective     Patient ID: Elina Garcia is a 64 y.o. female who presents for Diabetes.    Referring Provider: Bibi Bridges,      Patient reports BG was has been much better in recent weeks, with her consistent tracking of her sugars with her Dexcom G7. Patient's biggest concern has been that her neuropathy in her hands has been worsening rapidly, to the point of impacting her daily living.     Diabetes  She presents for her follow-up diabetic visit. She has type 2 diabetes mellitus. Her disease course has been improving. Hypoglycemia symptoms include dizziness, headaches and sweats. She is compliant with treatment most of the time.       Diet: Has been fairly good recently, eating \"better foods\", trying to watch what she is putting in her body, said her sugars are not going as high anymore after she eats.     Exercise: Takes walks frequently     Allergies   Allergen Reactions    Metformin Unknown       Objective     Current DM Pharmacotherapy:   Jardiance 10 mg - 1 tablet daily   Novolog Flex Pen - written to take per SSI up to 40 units daily  Average Units Injected: 12 AC   No SSI per endo  Semglee 100 u/mL Pen- written to take 45 units in the morning and 50 units in the evening   Mornin units, Evening 42 units  Mounjaro 7.5 mg/0.5 mL - once weekly   Injection Day:      Clarifications to above regimen: Patient has been holding her insulin if her blood sugars are under 120 mg/dL to help prevent her from dropping into lows.   Adverse Effects: None    SECONDARY PREVENTION  - Statin? Yes  Does pt have proteinuria? Yes If appropriate, is patient on ACEi/ARB? No, Cough from lisinopril  - Aspirin? Yes    Current monitoring regimen:   Patient is using: continuous glucose monitor    Testing frequency: CGM    SMBG Readings: TIR 73% // FBG today 116 mg/dL      After lunch generally ~180 mg/dL  After insulin staying in 120s-130  Mornings are  mg/dL    Average 3 day " range per G7  ~145 mg/dL    Waking up in 60s overnight (4 oz of juice)    Major neuropathy in hands, that is impacting daily living, in both hands but worse especially with the R hand  Cramping in legs, even with adequate water intake    Any episodes of hypoglycemia? Yes, 60s overnight .  Did patient treat episode of hypoglycemia appropriately? Yes, Drinks 4 oz of juice and sugars come back up   Does the patient have a prescription for ready-to-use Glucagon? No        Pertinent PMH Review:  - PMH of Pancreatitis: No  - PMH/FH of Medullary Thyroid Cancer: No  - PMH/FH of Multiple Endocrine Neoplasia (MEN) Type II: No  - PMH of Retinopathy: Yes  - PMH of Urinary Tract Infections/Yeast Infections: No    Lab Review  BMP  Lab Results   Component Value Date    CALCIUM 9.3 04/23/2024     04/23/2024    K 4.5 04/23/2024    CO2 27 04/23/2024     04/23/2024    BUN 21 04/23/2024    CREATININE 0.92 04/23/2024    EGFR 71 04/23/2024     LFTs  Lab Results   Component Value Date    ALT 32 04/23/2024    AST 19 04/23/2024    ALKPHOS 102 04/23/2024    BILITOT 0.6 04/23/2024     FLP  Lab Results   Component Value Date    TRIG 138 05/16/2024    CHOL 169 05/16/2024    LDLF 114 (H) 02/16/2023    LDLCALC 93 05/16/2024    HDL 48.4 05/16/2024       The 10-year ASCVD risk score (Rosa CARLOS, et al., 2019) is: 19.2%    Values used to calculate the score:      Age: 64 years      Sex: Female      Is Non- : Yes      Diabetic: Yes      Tobacco smoker: No      Systolic Blood Pressure: 138 mmHg      Is BP treated: Yes      HDL Cholesterol: 48.4 mg/dL      Total Cholesterol: 169 mg/dL  Urine Microalbumin  Lab Results   Component Value Date    MICROALBCREA 466.6 (H) 05/16/2024     Weight Management  Wt Readings from Last 3 Encounters:   07/24/24 107 kg (236 lb 4.8 oz)   07/23/24 108 kg (237 lb)   07/22/24 105 kg (232 lb)      There is no height or weight on file to calculate BMI.   A1C  Lab Results   Component  Value Date    HGBA1C 8.5 (A) 07/23/2024    HGBA1C 8.5 (H) 04/23/2024    HGBA1C 8.1 (H) 01/23/2024     Assessment/Plan     Problem List Items Addressed This Visit    None    Type 2 diabetes mellitus:  Recent BG improving, but now having hypoglycemic episodes. Average readings in the 140s mg/dL     Reached out to Dr. Fischer's office to adjust the Semglee to 36 units twice daily     CONTINUE  Novolog 12 units three times daily with meals  Jardiance 10 mg daily  Mounjaro 7.5 mg/0.5 mL    Type 2 diabetes mellitus, is not at goal. Goal A1C: <7%    Follow up: I recommend diabetes care be 1 week, to reassess hypoglycemic episodes 9/17 @9:30 AM or 10 AM  Reach out to Dr. Bridges to discuss worsening neuropathy in your hands with a potential gabapentin dose adjustment.    [employer]  Preceptor: Aurora Aguirre PharmD [facility]  Clinical Pharmacy Specialist, Primary Care     Continue all meds under the continuation of care with the referring provider and clinical pharmacy team

## 2024-09-11 ENCOUNTER — TELEPHONE (OUTPATIENT)
Dept: PRIMARY CARE | Facility: CLINIC | Age: 64
End: 2024-09-11
Payer: COMMERCIAL

## 2024-09-11 NOTE — TELEPHONE ENCOUNTER
Pt c/o hand pain  No discussion of thisat least in the last 6month per my notes  Ok to schedule a sick appt

## 2024-09-11 NOTE — TELEPHONE ENCOUNTER
----- Message from Aurora Aguirre sent at 9/10/2024  3:19 PM EDT -----  Regarding: Appointment Follow-Up  Hi Elina Ovalles asked if I would make sure to let you know that her pain in her hands has gotten significantly worse and is impacting her daily life. Per Elina Fischer does not think it is neuropathy and recommended she follow-up with you for further work up. She isn't scheduled with you until November and was wondering if she could get a sooner appointment or if not then maybe some kind of testing prior to her appointment. I told her I would let you know and either someone from the office or myself would give her a call and let her know what we could do.     Thanks,   Aurora Aguirre PharmD [facility]  Clinical Pharmacy Specialist, Primary Care

## 2024-09-11 NOTE — TELEPHONE ENCOUNTER
----- Message from Aurora Aguirre sent at 9/10/2024  3:19 PM EDT -----  Regarding: Appointment Follow-Up  Hi Elina Ovalles asked if I would make sure to let you know that her pain in her hands has gotten significantly worse and is impacting her daily life. Per Elina Fischer does not think it is neuropathy and recommended she follow-up with you for further work up. She isn't scheduled with you until November and was wondering if she could get a sooner appointment or if not then maybe some kind of testing prior to her appointment. I told her I would let you know and either someone from the office or myself would give her a call and let her know what we could do.     Thanks,   Aurora Aguirre PharmD [facility]  Clinical Pharmacy Specialist, Primary Care

## 2024-09-16 DIAGNOSIS — E11.3513 TYPE 2 DIABETES MELLITUS WITH BOTH EYES AFFECTED BY PROLIFERATIVE RETINOPATHY AND MACULAR EDEMA, WITHOUT LONG-TERM CURRENT USE OF INSULIN: ICD-10-CM

## 2024-09-16 PROCEDURE — RXMED WILLOW AMBULATORY MEDICATION CHARGE

## 2024-09-17 ENCOUNTER — LAB (OUTPATIENT)
Dept: LAB | Facility: LAB | Age: 64
End: 2024-09-17
Payer: COMMERCIAL

## 2024-09-17 ENCOUNTER — APPOINTMENT (OUTPATIENT)
Dept: PHARMACY | Facility: HOSPITAL | Age: 64
End: 2024-09-17
Payer: COMMERCIAL

## 2024-09-17 ENCOUNTER — APPOINTMENT (OUTPATIENT)
Dept: PRIMARY CARE | Facility: CLINIC | Age: 64
End: 2024-09-17
Payer: COMMERCIAL

## 2024-09-17 ENCOUNTER — HOSPITAL ENCOUNTER (OUTPATIENT)
Dept: RADIOLOGY | Facility: CLINIC | Age: 64
Discharge: HOME | End: 2024-09-17
Payer: COMMERCIAL

## 2024-09-17 VITALS
HEIGHT: 68 IN | OXYGEN SATURATION: 95 % | HEART RATE: 102 BPM | WEIGHT: 235.5 LBS | SYSTOLIC BLOOD PRESSURE: 138 MMHG | DIASTOLIC BLOOD PRESSURE: 72 MMHG | RESPIRATION RATE: 15 BRPM | BODY MASS INDEX: 35.69 KG/M2

## 2024-09-17 DIAGNOSIS — M79.642 PAIN IN BOTH HANDS: ICD-10-CM

## 2024-09-17 DIAGNOSIS — G56.01 RIGHT CARPAL TUNNEL SYNDROME: ICD-10-CM

## 2024-09-17 DIAGNOSIS — G89.29 ELBOW PAIN, CHRONIC, LEFT: ICD-10-CM

## 2024-09-17 DIAGNOSIS — M54.2 NECK PAIN: ICD-10-CM

## 2024-09-17 DIAGNOSIS — E11.3513 TYPE 2 DIABETES MELLITUS WITH BOTH EYES AFFECTED BY PROLIFERATIVE RETINOPATHY AND MACULAR EDEMA, WITHOUT LONG-TERM CURRENT USE OF INSULIN: ICD-10-CM

## 2024-09-17 DIAGNOSIS — G56.02 LEFT CARPAL TUNNEL SYNDROME: ICD-10-CM

## 2024-09-17 DIAGNOSIS — M25.522 ELBOW PAIN, CHRONIC, LEFT: ICD-10-CM

## 2024-09-17 DIAGNOSIS — G56.01 RIGHT CARPAL TUNNEL SYNDROME: Primary | ICD-10-CM

## 2024-09-17 DIAGNOSIS — R80.9 TYPE 2 DIABETES MELLITUS WITH MICROALBUMINURIA (MULTI): ICD-10-CM

## 2024-09-17 DIAGNOSIS — M79.641 PAIN IN BOTH HANDS: ICD-10-CM

## 2024-09-17 DIAGNOSIS — E11.65 POORLY CONTROLLED DIABETES MELLITUS (MULTI): ICD-10-CM

## 2024-09-17 DIAGNOSIS — E11.29 TYPE 2 DIABETES MELLITUS WITH MICROALBUMINURIA (MULTI): ICD-10-CM

## 2024-09-17 LAB
CHOLEST SERPL-MCNC: 165 MG/DL (ref 0–199)
CHOLESTEROL/HDL RATIO: 3.5
CREAT UR-MCNC: 65.8 MG/DL (ref 20–320)
EST. AVERAGE GLUCOSE BLD GHB EST-MCNC: 166 MG/DL
HBA1C MFR BLD: 7.4 %
HDLC SERPL-MCNC: 46.7 MG/DL
LDLC SERPL CALC-MCNC: 84 MG/DL
MICROALBUMIN UR-MCNC: 313.9 MG/L
MICROALBUMIN/CREAT UR: 477.1 UG/MG CREAT
NON HDL CHOLESTEROL: 118 MG/DL (ref 0–149)
TRIGL SERPL-MCNC: 170 MG/DL (ref 0–149)
VLDL: 34 MG/DL (ref 0–40)

## 2024-09-17 PROCEDURE — 3048F LDL-C <100 MG/DL: CPT | Performed by: FAMILY MEDICINE

## 2024-09-17 PROCEDURE — 36415 COLL VENOUS BLD VENIPUNCTURE: CPT

## 2024-09-17 PROCEDURE — RXMED WILLOW AMBULATORY MEDICATION CHARGE

## 2024-09-17 PROCEDURE — 82043 UR ALBUMIN QUANTITATIVE: CPT

## 2024-09-17 PROCEDURE — 80061 LIPID PANEL: CPT

## 2024-09-17 PROCEDURE — 99214 OFFICE O/P EST MOD 30 MIN: CPT | Performed by: FAMILY MEDICINE

## 2024-09-17 PROCEDURE — 73080 X-RAY EXAM OF ELBOW: CPT | Mod: LEFT SIDE | Performed by: RADIOLOGY

## 2024-09-17 PROCEDURE — 3062F POS MACROALBUMINURIA REV: CPT | Performed by: FAMILY MEDICINE

## 2024-09-17 PROCEDURE — 3075F SYST BP GE 130 - 139MM HG: CPT | Performed by: FAMILY MEDICINE

## 2024-09-17 PROCEDURE — 72050 X-RAY EXAM NECK SPINE 4/5VWS: CPT

## 2024-09-17 PROCEDURE — 83036 HEMOGLOBIN GLYCOSYLATED A1C: CPT

## 2024-09-17 PROCEDURE — 73110 X-RAY EXAM OF WRIST: CPT | Mod: BILATERAL PROCEDURE | Performed by: RADIOLOGY

## 2024-09-17 PROCEDURE — 3008F BODY MASS INDEX DOCD: CPT | Performed by: FAMILY MEDICINE

## 2024-09-17 PROCEDURE — 82570 ASSAY OF URINE CREATININE: CPT

## 2024-09-17 PROCEDURE — 72050 X-RAY EXAM NECK SPINE 4/5VWS: CPT | Performed by: RADIOLOGY

## 2024-09-17 PROCEDURE — 73110 X-RAY EXAM OF WRIST: CPT | Mod: 50

## 2024-09-17 PROCEDURE — 3078F DIAST BP <80 MM HG: CPT | Performed by: FAMILY MEDICINE

## 2024-09-17 PROCEDURE — 73080 X-RAY EXAM OF ELBOW: CPT | Mod: LT

## 2024-09-17 PROCEDURE — 3051F HG A1C>EQUAL 7.0%<8.0%: CPT | Performed by: FAMILY MEDICINE

## 2024-09-17 PROCEDURE — 73120 X-RAY EXAM OF HAND: CPT | Mod: 50

## 2024-09-17 PROCEDURE — 73120 X-RAY EXAM OF HAND: CPT | Mod: BILATERAL PROCEDURE | Performed by: RADIOLOGY

## 2024-09-17 PROCEDURE — 80048 BASIC METABOLIC PNL TOTAL CA: CPT

## 2024-09-17 RX ORDER — ALBUTEROL SULFATE 90 UG/1
INHALANT RESPIRATORY (INHALATION)
COMMUNITY
Start: 2023-12-28

## 2024-09-17 RX ORDER — TIRZEPATIDE 7.5 MG/.5ML
7.5 INJECTION, SOLUTION SUBCUTANEOUS
Qty: 2 ML | Refills: 1 | Status: SHIPPED | OUTPATIENT
Start: 2024-09-22

## 2024-09-17 ASSESSMENT — PATIENT HEALTH QUESTIONNAIRE - PHQ9
2. FEELING DOWN, DEPRESSED OR HOPELESS: NOT AT ALL
1. LITTLE INTEREST OR PLEASURE IN DOING THINGS: NOT AT ALL
SUM OF ALL RESPONSES TO PHQ9 QUESTIONS 1 AND 2: 0

## 2024-09-17 NOTE — PROGRESS NOTES
"Subjective   Patient ID: Elina Garcia is a 64 y.o. female who presents for Follow-up (Pt is here for c/o of bilateral hand tingling and wrist pain for the past three weeks.).    HPI   Awakes with numbness in the hands b/l  Occures throughout the day- worsened with driving and typing as well.  \"Shaking the hands out\" help with pain  No elbow or neck pain    Review of Systems  All systems reviewed and neg if not noted in the HPI above       Objective   /72 (Patient Position: Sitting)   Pulse 102   Resp 15   Ht 1.727 m (5' 8\")   Wt 107 kg (235 lb 8 oz)   SpO2 95%   BMI 35.81 kg/m²     Physical Exam  Constitutional: Well-nourished sitting on chair  Eyes: eye lids are without obvious rash or drooping.   Ears, Nose, Mouth, and Throat:  appear to be without deformity or rash. No lesions or masses noted. Hearing is grossly intact.   Respiratory: No gasping or shortness of breath noted, no use of accessory muscles noted.   Skin: No obvious rashes or lesions  identified on skin.   Psychiatric: Alert, orientation to person, place, and time. Recent/remote memory as evidenced through face-to-face interaction and discussion appear grossly intact.   Mood and affect are normal.     +tinel's sign of wrist and elbow  + squeeze test b/l  + Spurling sign  + reverse prayer sign    Assessment/Plan   Problem List Items Addressed This Visit             ICD-10-CM    Left carpal tunnel syndrome G56.02    Relevant Orders    EMG & nerve conduction    XR hand 1-2 views bilateral    XR wrist 3+ views bilateral    Referral to Orthopaedic Surgery    Right carpal tunnel syndrome - Primary G56.01    Relevant Orders    EMG & nerve conduction    XR hand 1-2 views bilateral    XR wrist 3+ views bilateral    Referral to Orthopaedic Surgery    Type 2 diabetes mellitus with proliferative diabetic retinopathy with macular edema, bilateral (Multi) E11.3513    Relevant Orders    Hemoglobin A1C    Albumin-Creatinine Ratio, Urine Random "     Other Visit Diagnoses         Codes    Pain in both hands     M79.641, M79.642    Relevant Orders    EMG & nerve conduction    XR hand 1-2 views bilateral    XR wrist 3+ views bilateral    Referral to Orthopaedic Surgery               Please follow up as scheduled in NOV or as needed.

## 2024-09-17 NOTE — PROGRESS NOTES
[facility] [place]  Outpatient Clinic Note    Subjective   Elina Garcia is a 64 y.o. female with urinary urgency, frequency, urinary incontinence.     History of Present Illness:    Previous urologic workup:  > PVR by US 6/26/24: 0mL    Previous urologic therapy:  > Hiprex 1g BID 12/2018, Dr Sexton - patient unsure if she took this or just acute course of ATB, not current  > PFPT 1-2 sessions so far    At last visit plan was for PFPT for OAB. Today she reports mixed urinary incontinence. Describes JULIANNE with cough, laugh, sneeze. This happens daily, typically small volume. She endorses urge incontinence, urgency. UUI happens typically at night but also during the day. Voiding 3-4x in 8 hour work day, about q2-2.5 hours at baseline. Nocturia 2x/night, down from 3x with behavioral modifications and PFPT. She is using ~2 Depends/day. She denies dysuria. Denies hematuria.     Denies vaginal prolapse symptoms. Denies vaginal bleeding.     She endorses chronic fecal incontinence. Another reason as to why she wears depends. Experiences FI a few times per week. Typically loose stool, not formed. Has bowel movements daily. Manages with dietary modifications, takes fiber ~4x/week. Per chart review colonoscopy in 2018, plan for repeat in 10 years.     Of note, patient known to Dr Sexton in 2019 for recurrent UTI, microscopic hematuria. Plan was for microscopic hematuria workup with imaging, cystoscopy. Patient does not recall undergoing any workup for this.     Her Pmhx, surgical history, meds, allergies were reviewed and updated as needed.     Initial consult with Isidra Schultz CNP 6/26/24:  Patient presenting to clinic today NPV with complaint of urinary urgency, frequency, and incontinence.   History of DM2, HTN, HLD, and Diarrhea. Patient reports that she has been wearing depends for the past   Couple of years. She has leaking when standing, frequency every two hours, sometimes urgency and unable to make it to restroom.  She drinks one cup of coffee in the am, at least 64 oz water daily, and usually two sodas in the evening. NTF 2 to 3x. No pregnancies. Reports UTI history. EMR reviewed, no recent Cultures.   No history of kidney stones or  malignancy. She has done kegel exercises in the past, has not for quite some time.   Patient is a nonsmoker     Past Medical History and Surgical History   Past Medical History:   Diagnosis Date    Abdominal tenderness, unspecified site 08/08/2018    Abdominal tenderness    Anesthesia of skin 07/11/2019    Numbness and tingling in both hands    Body mass index (BMI) 35.0-35.9, adult 05/20/2021    BMI 35.0-35.9,adult    Epigastric pain 09/13/2018    Chronic epigastric pain    History of falling 06/13/2018    H/O fall    Other specified noninflammatory disorders of vagina 05/31/2019    Vaginal irritation    Other symptoms and signs involving the nervous system 02/12/2021    Suspected sleep apnea    Pain in left shoulder 02/11/2021    Left shoulder pain    Pain in right knee 03/18/2017    Right knee pain    Pain in right thigh 03/18/2017    Pain of right thigh    Personal history of other diseases of the circulatory system 08/22/2019    History of hypertension    Personal history of other diseases of the circulatory system 12/06/2016    History of secondary hypertension    Personal history of other diseases of the musculoskeletal system and connective tissue 06/13/2018    History of low back pain    Personal history of other diseases of the nervous system and sense organs 02/12/2021    History of sleep apnea    Personal history of other diseases of urinary system 12/12/2019    History of hematuria    Personal history of other drug therapy 12/06/2016    History of pneumococcal vaccination    Personal history of other drug therapy 08/30/2017    History of influenza vaccination    Personal history of other drug therapy 12/06/2016    History of influenza vaccination    Personal history of other  endocrine, nutritional and metabolic disease     History of type 2 diabetes mellitus    Personal history of other endocrine, nutritional and metabolic disease     History of hyperlipidemia    Personal history of other endocrine, nutritional and metabolic disease 04/11/2022    History of diabetes mellitus    Personal history of other endocrine, nutritional and metabolic disease 11/30/2021    History of hyperlipidemia    Personal history of other specified conditions 05/13/2021    History of dysuria    Personal history of other specified conditions 06/20/2019    History of abdominal pain    Personal history of other specified conditions 06/20/2019    History of fever    Personal history of other specified conditions 07/02/2019    History of fatigue    Personal history of other specified conditions 08/08/2018    History of chest pain    Personal history of other specified conditions 12/06/2016    History of tachycardia    Personal history of traumatic brain injury     History of concussion    Personal history of urinary (tract) infections 12/12/2019    History of urinary tract infection    Unspecified abdominal pain 04/02/2019    Abdominal cramping    Unspecified multiple injuries, initial encounter 05/23/2018    Contusion, multiple sites    Unspecified urinary incontinence 05/02/2019    Urinary incontinence in female     Past Surgical History:   Procedure Laterality Date    HYSTERECTOMY  12/06/2016    Hysterectomy    KNEE SURGERY  12/06/2016    Knee Surgery       Medications  Current Outpatient Medications on File Prior to Visit   Medication Sig Dispense Refill    albuterol 90 mcg/actuation inhaler Inhale.      amLODIPine (Norvasc) 5 mg tablet TAKE 1 TABLET BY MOUTH EVERY DAY 90 tablet 3    atorvastatin (Lipitor) 80 mg tablet Take 1 tablet (80 mg) by mouth once daily. 90 tablet 0    blood-glucose sensor (Dexcom G7 Sensor) device Change every 10 days 9 each 3    coenzyme Q-10 100 mg capsule Take 1 capsule (100 mg) by  mouth once daily. 90 capsule 1    Dexcom G7  misc Use as instructed 1 each 0    empagliflozin (Jardiance) 10 mg Take 1 tablet (10 mg) by mouth once daily. 90 tablet 3    fluconazole (Diflucan) 150 mg tablet Take 1 tablet orally one time only.  May repeat in 3 days if needed. 2 tablet 0    gabapentin (Neurontin) 300 mg capsule Take 1 capsule (300 mg) by mouth 2 times a day. 180 capsule 3    insulin aspart (NovoLOG Flexpen U-100 Insulin) 100 unit/mL (3 mL) pen Inject with meals up to 40 units daily; pt forgot insulin pen out of town 3 mL 0    insulin NPH and regular human (HumuLIN 70-30, NovoLIN 70-30) 100 unit/mL (70-30) injection Inject under the skin.      metoprolol succinate XL (Toprol-XL) 50 mg 24 hr tablet Take 1 tablet (50 mg) by mouth once daily. 90 tablet 3    multivitamin (Daily Multi-Vitamin) tablet Take 1 tablet by mouth.      pantoprazole (ProtoNix) 40 mg EC tablet Take 1 tablet (40 mg) by mouth once daily. 90 tablet 0    Semglee,insulin glarg-yfgn,Pen 100 unit/mL (3 mL) Pen INJECT 45 UNITS UNDER THE SKIN EVERY MORNING AND 50 UNITS EVERY EVENING (Patient taking differently: Inject 42-48 Units under the skin 2 times a day.) 30 mL 3    sulfamethoxazole-trimethoprim (Bactrim DS) 800-160 mg tablet Take 1 tablet by mouth 2 times a day, for 5 days. 10 tablet 0    tirzepatide (Mounjaro) 7.5 mg/0.5 mL pen injector Inject 7.5 mg under the skin 1 (one) time per week. 2 mL 1    [DISCONTINUED] atorvastatin (Lipitor) 40 mg tablet TAKE 1 TABLET BY MOUTH DAILY 90 tablet 3     No current facility-administered medications on file prior to visit.       Objective   Physicial Exam  General: Well developed, well nourished, alert and cooperative, appears in no acute distress  Eyes: Non-injected conjunctiva, sclera clear, no proptosis  Cardiac: Extremities are warm and well perfused. No edema, cyanosis or pallor.   Lungs: Breathing is easy, non-labored. Speaking in clear and complete sentences. Normal diaphragmatic  movement.  MSK: Ambulatory with steady gait, unassisted  Neuro: alert and oriented to person, place and time  Psych: Demonstrates good judgement and reason, without hallucinations, abnormal affect or abnormal behaviors.  Skin: no obvious lesions, no rashes.    5/16/2024 microscopic urinalysis: 3-5 RBCs, 21-50 WBC  4/13/2024 microscopic urinalysis 1-2 RBCs, 1-5 WBC  1/24/2024 microscopic urinalysis 1-2 RBCs, 1-5 RBC    HgbA1c 9/17/24 7.4%      Chemistry    Lab Results   Component Value Date/Time     09/17/2024 0910    K 4.3 09/17/2024 0910     09/17/2024 0910    CO2 24 09/17/2024 0910    BUN 20 09/17/2024 0910    CREATININE 0.87 09/17/2024 0910    Lab Results   Component Value Date/Time    CALCIUM 9.8 09/17/2024 0910    ALKPHOS 102 04/23/2024 1110    AST 19 04/23/2024 1110    ALT 32 04/23/2024 1110    BILITOT 0.6 04/23/2024 1110          Review of Systems  All other systems have been reviewed and are negative for complaint.    Assessment and Plan   Elina Garcia is a 64 y.o. female with mixed urinary incontinence, fecal incontinence. Per chart review, patient previously followed by Dr Sexton for microscopic hematuria with plan for workup but was not completed.     > Urinary urgency, frequency, UUI: We discussed management strategies including behavioral modification including timed voiding, fluid restriction, avoidance of irritants. She is currently attending PFPT, 1-2 sessions so far. She would like to continue with PFPT at this time and reassess symptoms after completion fo therapy. We did review medication as an option as well, not interested at this time.  We briefly reviewed third line therapies including Botox and sacral neuromodulation. SNS may improve fecal incontinence as well.     > Stress Urinary Incontinence: We reviewed the pathophysiology and risk factors associated with stress urinary incontinence.  She was offered expectant management, pelvic floor physical therapy, pessary,  procedure/surgery. She would like to proceed with PFPT.    > Fecal incontinence: Recommended daily fiber supplementation to help bulk stool and allow for complete, daily bowel movement. She was given patient education handouts from [employer]. If not improved with fiber, will recommend further evaluation with GI if interested.     > Microscopic hematuria: Per chart review,  5/16/2024 microscopic urinalysis: 3-5 RBCs, 21-50 WBC. January 2024 and April 2024 microscopic UA with 1-2 RBCs and 1-5 WBC.  Discussed with Dr. Mosqueda who recommended further workup and evaluation of microscopic hematuria given elevated RBCs on May urine specimen.  Attempted to call patient to discuss further, no answer, left generic message requesting call back.  [employer] message also sent requesting callback.  Repeat culture and urinalysis ordered as there are no recent cultures to review.  Can be done outpatient at any  lab.   Given patient's age greater than 60, recommendations per AUA guidelines are for CT urogram and cystoscopy.  She has no smoking history and no history of gross hematuria.  Will follow-up with patient.    Follow up: Will contact patient regarding microscopic hematuria workup; 2-3 months after PFPT for symptom reassessment    Patient discussed with Dr Jaylin Zhang PA-C  09/24/24 3:02 PM  Clinic phone: [phone], [phone]

## 2024-09-17 NOTE — PATIENT INSTRUCTIONS
- Carpal tunnel splint at night and during the day  - Checking EMG  - checking xray hand and wrist  - referral for ortho      Please follow up as scheduled in NOV or as needed.       ** If labs or imaging ordered at today's visit, all the non-urgent results will be discussed at your next visit    If you have been referred for a special test or to a specialist please call  [phone] to schedule an appointment.  If you have any further questions, or if develop new or worsened symptoms, please give our office a call at [phone].

## 2024-09-18 ENCOUNTER — PHARMACY VISIT (OUTPATIENT)
Dept: PHARMACY | Facility: CLINIC | Age: 64
End: 2024-09-18
Payer: COMMERCIAL

## 2024-09-18 DIAGNOSIS — E78.5 HYPERLIPIDEMIA, UNSPECIFIED HYPERLIPIDEMIA TYPE: ICD-10-CM

## 2024-09-18 DIAGNOSIS — R80.9 TYPE 2 DIABETES MELLITUS WITH MICROALBUMINURIA (MULTI): ICD-10-CM

## 2024-09-18 DIAGNOSIS — E11.29 TYPE 2 DIABETES MELLITUS WITH MICROALBUMINURIA (MULTI): ICD-10-CM

## 2024-09-18 DIAGNOSIS — E11.3513 TYPE 2 DIABETES MELLITUS WITH BOTH EYES AFFECTED BY PROLIFERATIVE RETINOPATHY AND MACULAR EDEMA, WITHOUT LONG-TERM CURRENT USE OF INSULIN: Primary | ICD-10-CM

## 2024-09-18 LAB
ANION GAP SERPL CALC-SCNC: 16 MMOL/L (ref 10–20)
BUN SERPL-MCNC: 20 MG/DL (ref 6–23)
CALCIUM SERPL-MCNC: 9.8 MG/DL (ref 8.6–10.6)
CHLORIDE SERPL-SCNC: 106 MMOL/L (ref 98–107)
CO2 SERPL-SCNC: 24 MMOL/L (ref 21–32)
CREAT SERPL-MCNC: 0.87 MG/DL (ref 0.5–1.05)
EGFRCR SERPLBLD CKD-EPI 2021: 75 ML/MIN/1.73M*2
GLUCOSE SERPL-MCNC: 148 MG/DL (ref 74–99)
POTASSIUM SERPL-SCNC: 4.3 MMOL/L (ref 3.5–5.3)
SODIUM SERPL-SCNC: 142 MMOL/L (ref 136–145)

## 2024-09-18 RX ORDER — ATORVASTATIN CALCIUM 80 MG/1
80 TABLET, FILM COATED ORAL DAILY
Qty: 90 TABLET | Refills: 0 | Status: SHIPPED | OUTPATIENT
Start: 2024-09-18 | End: 2024-12-17

## 2024-09-20 ENCOUNTER — TREATMENT (OUTPATIENT)
Dept: PHYSICAL THERAPY | Facility: CLINIC | Age: 64
End: 2024-09-20
Payer: COMMERCIAL

## 2024-09-20 DIAGNOSIS — R32 URINARY INCONTINENCE, UNSPECIFIED TYPE: ICD-10-CM

## 2024-09-20 PROCEDURE — 97535 SELF CARE MNGMENT TRAINING: CPT | Mod: GP | Performed by: PHYSICAL THERAPIST

## 2024-09-20 NOTE — PROGRESS NOTES
Physical Therapy  Physical Therapy Pelvic Floor    Name: Elina Garcia  MRN: [id number]  : 1960  Today's Date: 24          Insurance: Darrell ROBERT, no auth  Visit # 3 of 35 for     Current Problem:  1. Urinary incontinence, unspecified type  Follow Up In Physical Therapy          General     Precautions     Vital Signs     Pain       Subjective  Chief Complaint: urinary urgency and frequency, incontinence  - 4-5 years ago, uncontrollable diarrhea, occurred for 2 years, no urge, Side Effect of Medications, only time it happens at this time is with anxiety  - urinary leaking with coughing and sneezing, lifting, sometimes with walking  - Nocturia: 3-4x  - bilateral wrist pain from a fall - was going to OT/worker's comp, just recently stopped  Onset: 4-5 yrs ago  MONSE: unknown    Today:   - drank 1 fox light last night, no leaking overnight, increased leaking and urinary incontinence this morning  - increased diarrhea for the past week, PCP is aware and assisting with medication changes due to side effects  - reports using imodium as needed  - numbness/tingling of fingers bilaterally since her fall - PCP: recent x-rays, order placed for EMG  - R sided UE pain of shoulder, upper arm, lower arm and fingers    PAIN  Intensity (0-10): 4  Location: back pain, shoulder pain, R collar bone  Description: soreness, aching    Treatment Goals: control her bladder more    BLADDER = nocturia, PF tightness & weakness  BOWEL= discuss next visit  Frequency of Bowel Movements: regular  Management Techniques: metamucil      Objective      Patient was educated on pelvic floor anatomy, function, and dysfunction.   Patient was educated on an orthopedic assessment & external pelvic floor assessment technique and verbalized consent.   The patient is aware they have full autonomy and can stop the assessment at any time.     Standing Assessment:   Posture: rounded shoulders, increased thoracic kyphosis, increased lumbar  lordosis  Sit to stand: observation, used bilateral UE assist  Gait: compensated trendelenburg bilaterally  SL Stance: <10 bilaterally  DL Squat: decreased depth  Standing Lumbar Mobility: major tightness all directions  SLR: pelvic rocking and doming  Bridge: limited lumbar extension  Hip PROM: mod limitation IR/ER  MMT: sup hip: 4-/5 bilaterally  - Hamstrings: major tightness  - Piriformis: major tightness  Diaphragmatic Breathing: accessory breathing  Rib Palpation/Positioning: flared  Assess Abdomen  Abdominal Palpation: increased abdominal tissue L Lower abdomen  Transverse Abdominus Contraction: poor    Treatments:   Discussed:   - avoid holding breath with transitions (exhale)  - JICing  - healthy bladder habits  - Squeeze before you sneeze  - discussed concern about increased abdominal tissue of LLQ abdomen - recommend discussion with PCP for further consult  - discussed diarrhea management - imodium(follow instructions on package), metamucil, and call GI specialist for further consult  Patient education: home exercise program, plan of care, activity modifications, pain management, and injury pathology    Access Code: [email or web address]  STRETCHING  - Supine Lower Trunk Rotation  - 3-5 x weekly - 10-30 reps  - Hooklying Single Knee to Chest Stretch  - 3-5 x weekly - 3 sets - 10 seconds hold  - Supine Pelvic Floor Stretch  - 3-5 x weekly - 3 sets - 5 breaths hold  - Supine Gluteus Stretch  - 3-5 x weekly - 3 sets - 20 seconds hold  - Supine Figure 4 Piriformis Stretch  - 3-5 x weekly - 3 sets - 20 seconds hold  STRENGTH  - Supine Pelvic Tilt  - 3-5 x weekly - 10 reps  - Hooklying Transversus Abdominis Palpation  - 3-5 x weekly - 5-10 reps - 2 breaths hold    Access Code: [email or web address]  - Seated Wrist Flexion Stretch  - 1 x daily - 3 sets - 15-20 seconds hold (extension, gravity assist)  - Seated Scapular Retraction  - 1-3 x daily - 10 reps - 5-10 seconds hold  - Seated Cervical Retraction  - 1-3 x daily - 10 reps - 5-10  seconds hold  - Supine Pectoralis Stretch  - 1 x daily - 30-60 seconds hold    Plan for Next Visit: continued assessment  - behavioral training: bladder training, urge suppression, triggers  - discuss what is the pelvic floor and PFPT assessment pieces  - hep: strength/control of PF/hips/core muscles    Assessment: Patient presents with signs and symptoms consistent with urinary incontinence, muscle weakness, abnormal gait, resulting in limited participation in pain-free ADLs and inability to perform at their prior level of function. Pt would benefit from physical therapy to address the impairments found & listed previously in the objective section in order to return to safe and pain-free ADLs and prior level of function.  - continued urinary symptoms, complicated due to recent flare of diarrhea over the last week  - continue arm/shoulder/hand symptoms, indicative of upper cross syndrome and soft tissue injury/complaint following fall    Plan:   Planned Interventions include: therapeutic exercise, self-care home management, manual therapy, therapeutic activities, gait training, neuromuscular coordination, aquatic therapy  Patient and/or family understands and agrees with goals and plan documented: yes  Frequency: 2x/month  Duration: 3-4 months     Jenny Olivares, PT

## 2024-09-24 ENCOUNTER — APPOINTMENT (OUTPATIENT)
Dept: UROLOGY | Facility: CLINIC | Age: 64
End: 2024-09-24
Payer: COMMERCIAL

## 2024-09-24 VITALS — TEMPERATURE: 96.7 F

## 2024-09-24 DIAGNOSIS — R31.29 MICROSCOPIC HEMATURIA: Primary | ICD-10-CM

## 2024-09-24 DIAGNOSIS — R35.1 NOCTURIA: ICD-10-CM

## 2024-09-24 DIAGNOSIS — N39.46 MIXED STRESS AND URGE URINARY INCONTINENCE: ICD-10-CM

## 2024-09-24 PROCEDURE — 3048F LDL-C <100 MG/DL: CPT | Performed by: PHYSICIAN ASSISTANT

## 2024-09-24 PROCEDURE — 1036F TOBACCO NON-USER: CPT | Performed by: PHYSICIAN ASSISTANT

## 2024-09-24 PROCEDURE — 99214 OFFICE O/P EST MOD 30 MIN: CPT | Performed by: PHYSICIAN ASSISTANT

## 2024-09-24 PROCEDURE — 3051F HG A1C>EQUAL 7.0%<8.0%: CPT | Performed by: PHYSICIAN ASSISTANT

## 2024-09-24 PROCEDURE — 3062F POS MACROALBUMINURIA REV: CPT | Performed by: PHYSICIAN ASSISTANT

## 2024-09-24 ASSESSMENT — PAIN SCALES - GENERAL: PAINLEVEL: 0-NO PAIN

## 2024-09-25 ENCOUNTER — HOSPITAL ENCOUNTER (OUTPATIENT)
Dept: NEUROLOGY | Facility: CLINIC | Age: 64
Discharge: HOME | End: 2024-09-25
Payer: COMMERCIAL

## 2024-09-25 ENCOUNTER — TELEPHONE (OUTPATIENT)
Dept: UROLOGY | Facility: CLINIC | Age: 64
End: 2024-09-25

## 2024-09-25 DIAGNOSIS — M79.642 PAIN IN BOTH HANDS: ICD-10-CM

## 2024-09-25 DIAGNOSIS — G56.01 RIGHT CARPAL TUNNEL SYNDROME: ICD-10-CM

## 2024-09-25 DIAGNOSIS — M79.641 PAIN IN BOTH HANDS: ICD-10-CM

## 2024-09-25 DIAGNOSIS — G56.02 LEFT CARPAL TUNNEL SYNDROME: ICD-10-CM

## 2024-09-25 PROCEDURE — 95886 MUSC TEST DONE W/N TEST COMP: CPT | Performed by: PSYCHIATRY & NEUROLOGY

## 2024-09-25 PROCEDURE — 95912 NRV CNDJ TEST 11-12 STUDIES: CPT | Performed by: PSYCHIATRY & NEUROLOGY

## 2024-09-25 NOTE — PROCEDURES
EMG & nerve conduction    Date/Time: 9/25/2024 2:41 PM    Performed by: Omar Vee MD  Authorized by: Bibi Bridges DO    Consent:     Consent obtained:  Written    Consent given by:  Patient  Universal protocol:     Procedure explained and questions answered to patient or proxy's satisfaction: yes      Patient identity confirmed:  Verbally with patient and provided demographic data  Indications:     Indications:  6-month history of numbness/tingling in fingers bilaterally, worse over past 3-4 weeks.  Evaluate for median neuropathy at the wrist.  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    2) There is electrophysiologic evidence suggestive of a superimposed generalized axonal polyneuropathy, but the present study was not protocoled for the question of polyneuropathy and as such did not include lower extremity testing.  Clinical correlation is recommended.    3) There is no electrophysiologic evidence of left cervical radiculopathy.    Omar Vee MD    Post-procedure details:     Procedure completion:  Tolerated well, no immediate complications

## 2024-09-25 NOTE — TELEPHONE ENCOUNTER
Called patient to discuss recommendation for microscopic hematuria workup. No answer. Left generic message requesting call back. Phone number provided.     Heather Zhang PA-C  09/25/24 2:58 PM  Clinic phone: [phone], [phone]

## 2024-09-27 ENCOUNTER — TELEPHONE (OUTPATIENT)
Dept: UROLOGY | Facility: CLINIC | Age: 64
End: 2024-09-27
Payer: COMMERCIAL

## 2024-09-27 NOTE — TELEPHONE ENCOUNTER
Called patient to review recommendation for workup of microscopic hematuria. No answer. Unable to leave voicemail, mailbox full.     Heather Zhang PA-C  09/27/24 12:23 PM  Clinic phone: [phone], [phone]

## 2024-09-28 ENCOUNTER — PATIENT MESSAGE (OUTPATIENT)
Dept: PRIMARY CARE | Facility: CLINIC | Age: 64
End: 2024-09-28
Payer: COMMERCIAL

## 2024-09-28 DIAGNOSIS — K21.9 GASTROESOPHAGEAL REFLUX DISEASE WITHOUT ESOPHAGITIS: ICD-10-CM

## 2024-10-03 RX ORDER — PANTOPRAZOLE SODIUM 40 MG/1
40 TABLET, DELAYED RELEASE ORAL DAILY
Qty: 90 TABLET | Refills: 0 | Status: SHIPPED | OUTPATIENT
Start: 2024-10-03

## 2024-10-07 ENCOUNTER — APPOINTMENT (OUTPATIENT)
Dept: PHARMACY | Facility: HOSPITAL | Age: 64
End: 2024-10-07
Payer: COMMERCIAL

## 2024-10-07 DIAGNOSIS — E11.3513 TYPE 2 DIABETES MELLITUS WITH BOTH EYES AFFECTED BY PROLIFERATIVE RETINOPATHY AND MACULAR EDEMA, WITHOUT LONG-TERM CURRENT USE OF INSULIN: ICD-10-CM

## 2024-10-07 PROCEDURE — RXMED WILLOW AMBULATORY MEDICATION CHARGE

## 2024-10-07 RX ORDER — TIRZEPATIDE 10 MG/.5ML
1 INJECTION, SOLUTION SUBCUTANEOUS
Qty: 2 ML | Refills: 2 | Status: SHIPPED | OUTPATIENT
Start: 2024-10-07 | End: 2024-10-07 | Stop reason: SDUPTHER

## 2024-10-07 RX ORDER — TIRZEPATIDE 10 MG/.5ML
1 INJECTION, SOLUTION SUBCUTANEOUS
Qty: 2 ML | Refills: 2 | Status: SHIPPED | OUTPATIENT
Start: 2024-10-07

## 2024-10-07 ASSESSMENT — ENCOUNTER SYMPTOMS
DIZZINESS: 1
HEADACHES: 1
SWEATS: 1

## 2024-10-07 NOTE — Clinical Note
Working to get patient approved for  Patient Assistance. If approved would you be ok with me sending DM Rxs to  [employer] for the program?

## 2024-10-07 NOTE — ASSESSMENT & PLAN NOTE
BG has been elevated recently. No SHAHID from current dose of Mounjaro. No further hypoglycemia.     After finishing 5 doses of Mounjaro 7.5 mg she has at home:     Increase Mounjaro 10 mg/0.5 mL - once weekly     Continue  Jardiance 10 mg - 1 tablet daily   Novolog Flex Pen - written to take per SSI up to 40 units daily  Average Units Injected: 12-14 AC   No SSI per endo  Semglee 100 u/mL Pen- 36 units twice daily   Mornin units, Evening 42 units

## 2024-10-08 ENCOUNTER — TELEPHONE (OUTPATIENT)
Dept: UROLOGY | Facility: CLINIC | Age: 64
End: 2024-10-08

## 2024-10-08 ENCOUNTER — OFFICE VISIT (OUTPATIENT)
Dept: ORTHOPEDIC SURGERY | Facility: CLINIC | Age: 64
End: 2024-10-08
Payer: COMMERCIAL

## 2024-10-08 DIAGNOSIS — G56.01 RIGHT CARPAL TUNNEL SYNDROME: ICD-10-CM

## 2024-10-08 DIAGNOSIS — M79.641 PAIN IN BOTH HANDS: ICD-10-CM

## 2024-10-08 DIAGNOSIS — M79.642 PAIN IN BOTH HANDS: ICD-10-CM

## 2024-10-08 DIAGNOSIS — G56.02 LEFT CARPAL TUNNEL SYNDROME: ICD-10-CM

## 2024-10-08 PROCEDURE — 3048F LDL-C <100 MG/DL: CPT | Performed by: ORTHOPAEDIC SURGERY

## 2024-10-08 PROCEDURE — 3062F POS MACROALBUMINURIA REV: CPT | Performed by: ORTHOPAEDIC SURGERY

## 2024-10-08 PROCEDURE — 99214 OFFICE O/P EST MOD 30 MIN: CPT | Performed by: ORTHOPAEDIC SURGERY

## 2024-10-08 PROCEDURE — 1036F TOBACCO NON-USER: CPT | Performed by: ORTHOPAEDIC SURGERY

## 2024-10-08 PROCEDURE — 3051F HG A1C>EQUAL 7.0%<8.0%: CPT | Performed by: ORTHOPAEDIC SURGERY

## 2024-10-08 NOTE — LETTER
October 31, 2024     Bibi Bridges DO  [address]  [address]  [facility] [address]    Patient: Elina Garcia   YOB: 1960   Date of Visit: 10/8/2024       Dear Dr. Bibi Bridges DO:    Thank you for referring Elina Garcia to me for evaluation. Below are my notes for this consultation.  If you have questions, please do not hesitate to call me. I look forward to following your patient along with you.       Sincerely,     Brando Angulo MD      CC: No Recipients  ______________________________________________________________________________________    CHIEF COMPLAINT         Bilateral hand pain and tingling    ASSESSMENT + PLAN    Bilateral carpal tunnel syndrome with positive EMG, worse on the left    The nature of carpal tunnel syndrome was reviewed, along with the slowly progressive natural history.  The options for management were reviewed, including night splinting, cortisone injection, or surgical carpal tunnel release.  The major benefits and risks of surgery were specifically reviewed, as was the postoperative rehabilitation course.    Before trying anything invasive, the patient wanted to try a more regular course of night splinting.  Proper splint use was reviewed.  Followup in 4 weeks if things are not improving to their satisfaction.  She may simply contact the office if she would like to set up carpal tunnel surgery.  That would be done at the location of her convenience, on whichever side is more symptomatic at that time, likely the left.        HISTORY OF PRESENT ILLNESS       Patient returns today, about 18 months after last visit with me.  She reports significant tingling and pain in bilateral long, ring, small fingers and occasional radiation up as high as the neck.  She had been doing well after her last visit with me in March 2023 with her bilateral carpal tunnel syndrome.  She has not been regularly using her braces.  The de Quervain's symptoms are gone.  Symptoms  of the tingling have gotten worse since she tripped off of a deck in June.  She has had a course of chiropractic and an EMG, summarized below.  She also had x-rays of wrist and neck that did not show anything significant.  Symptoms wake her from sleep with a positive shake sign.  She is having mild tingling during the day.  No noted weakness.  Not dropping objects.    She is diabetic with last A1c of 7.4.  She is not hypothyroid and does not smoke.      PHYSICAL EXAM       She remains well-developed, well-nourished obese female in no acute distress.  She appears her stated age and has a pleasant affect.  Skin of both hands and wrists is intact with no erythema, ecchymosis, or diffuse swelling.  Normal skin drag and coloration.  Full composite finger flexion extension with good intrinsic plus minus posture.  5/5 APB and hand intrinsics bilaterally.  Good thumb opposition to station 9.  Positive Phalen and Durkan bilaterally, more brisk on the left.  Negative Tinel at wrist and elbow.  Negative elbow flexion test.  Cervical range of motion is limited in both rotation and lateral bend but does not cause discomfort down either arm.  Negative Nadya.  Sensation intact to light touch in all distributions.  Capillary refill less than 2 seconds.      IMAGING / LABS / EMGs        Bilateral hand x-rays from September 17 independently interpreted by me today and show no acute fracture, subluxation, or foreign body.  There is mild osteoarthritic narrowing.  Fairly significant osteopenia.  Joints are generally concentric and well-preserved.    EMG from September 25 was reviewed and is consistent with carpal tunnel syndrome, worse on the left.  Needle components are grossly normal.      Electronically Signed      SHAQ Angulo MD      Orthopaedic Hand Surgery      [phone]

## 2024-10-08 NOTE — TELEPHONE ENCOUNTER
Called patient to discuss recommendation for microscopic hematuria workup. No answer. Left generic message requesting call back. Phone number provided.     Heather Zhang PA-C  10/08/24 9:52 AM  Clinic phone: [phone], [phone]

## 2024-10-08 NOTE — PROGRESS NOTES
CHIEF COMPLAINT         Bilateral hand pain and tingling    ASSESSMENT + PLAN    Bilateral carpal tunnel syndrome with positive EMG, worse on the left    The nature of carpal tunnel syndrome was reviewed, along with the slowly progressive natural history.  The options for management were reviewed, including night splinting, cortisone injection, or surgical carpal tunnel release.  The major benefits and risks of surgery were specifically reviewed, as was the postoperative rehabilitation course.    Before trying anything invasive, the patient wanted to try a more regular course of night splinting.  Proper splint use was reviewed.  Followup in 4 weeks if things are not improving to their satisfaction.  She may simply contact the office if she would like to set up carpal tunnel surgery.  That would be done at the location of her convenience, on whichever side is more symptomatic at that time, likely the left.        HISTORY OF PRESENT ILLNESS       Patient returns today, about 18 months after last visit with me.  She reports significant tingling and pain in bilateral long, ring, small fingers and occasional radiation up as high as the neck.  She had been doing well after her last visit with me in March 2023 with her bilateral carpal tunnel syndrome.  She has not been regularly using her braces.  The de Quervain's symptoms are gone.  Symptoms of the tingling have gotten worse since she tripped off of a deck in June.  She has had a course of chiropractic and an EMG, summarized below.  She also had x-rays of wrist and neck that did not show anything significant.  Symptoms wake her from sleep with a positive shake sign.  She is having mild tingling during the day.  No noted weakness.  Not dropping objects.    She is diabetic with last A1c of 7.4.  She is not hypothyroid and does not smoke.      PHYSICAL EXAM       She remains well-developed, well-nourished obese female in no acute distress.  She appears her stated age and  has a pleasant affect.  Skin of both hands and wrists is intact with no erythema, ecchymosis, or diffuse swelling.  Normal skin drag and coloration.  Full composite finger flexion extension with good intrinsic plus minus posture.  5/5 APB and hand intrinsics bilaterally.  Good thumb opposition to station 9.  Positive Phalen and Durkan bilaterally, more brisk on the left.  Negative Tinel at wrist and elbow.  Negative elbow flexion test.  Cervical range of motion is limited in both rotation and lateral bend but does not cause discomfort down either arm.  Negative Nadya.  Sensation intact to light touch in all distributions.  Capillary refill less than 2 seconds.      IMAGING / LABS / EMGs        Bilateral hand x-rays from September 17 independently interpreted by me today and show no acute fracture, subluxation, or foreign body.  There is mild osteoarthritic narrowing.  Fairly significant osteopenia.  Joints are generally concentric and well-preserved.    EMG from September 25 was reviewed and is consistent with carpal tunnel syndrome, worse on the left.  Needle components are grossly normal.      Electronically Signed      SHAQ Angulo MD      Orthopaedic Hand Surgery      [phone]

## 2024-10-11 ENCOUNTER — PHARMACY VISIT (OUTPATIENT)
Dept: PHARMACY | Facility: CLINIC | Age: 64
End: 2024-10-11
Payer: COMMERCIAL

## 2024-10-11 ENCOUNTER — TREATMENT (OUTPATIENT)
Dept: PHYSICAL THERAPY | Facility: CLINIC | Age: 64
End: 2024-10-11
Payer: COMMERCIAL

## 2024-10-11 DIAGNOSIS — R32 URINARY INCONTINENCE, UNSPECIFIED TYPE: ICD-10-CM

## 2024-10-11 PROCEDURE — 97112 NEUROMUSCULAR REEDUCATION: CPT | Mod: GP | Performed by: PHYSICAL THERAPIST

## 2024-10-11 PROCEDURE — 97535 SELF CARE MNGMENT TRAINING: CPT | Mod: GP | Performed by: PHYSICAL THERAPIST

## 2024-10-11 NOTE — PROGRESS NOTES
Physical Therapy  Physical Therapy Pelvic Floor    Name: Elina Garcia  MRN: [id number]  : 1960  Today's Date: 10/11/24          Insurance: Darrell ROBERT, no auth  Visit # 4 of 35 for     Current Problem:  1. Urinary incontinence, unspecified type  Follow Up In Physical Therapy          General     Precautions     Vital Signs     Pain       Subjective  Chief Complaint: urinary urgency and frequency, incontinence  - 4-5 years ago, uncontrollable diarrhea, occurred for 2 years, no urge, Side Effect of Medications, only time it happens at this time is with anxiety  - urinary leaking with coughing and sneezing, lifting, sometimes with walking  - Nocturia: 3-4x  - bilateral wrist pain from a fall - was going to OT/worker's comp, just recently stopped  Onset: 4-5 yrs ago  MONSE: unknown    Today:   - carpal tunnel and wearing wrist brace at night - noticing improvement already  - metamucil - back to regular bowel movements since last visit  - hep compliance: yes  - morning urinary leaking still present, every-time she stood up over the last couple of days (associated urge)    PAIN  Intensity (0-10): 4  Location: back pain, shoulder pain, R collar bone  Description: soreness, aching    Treatment Goals: control her bladder more    BLADDER = nocturia, PF tightness & weakness  BOWEL= discuss next visit  Frequency of Bowel Movements: regular  Management Techniques: metamucil      Objective  Patient was educated on pelvic floor anatomy, function, and dysfunction.   Patient was educated on an orthopedic assessment & external pelvic floor assessment technique and verbalized consent.   The patient is aware they have full autonomy and can stop the assessment at any time.     Standing Assessment:   Posture: rounded shoulders, increased thoracic kyphosis, increased lumbar lordosis  Sit to stand: observation, used bilateral UE assist  Gait: compensated trendelenburg bilaterally  SL Stance: <10 bilaterally  DL Squat: decreased  depth  Standing Lumbar Mobility: major tightness all directions  SLR: pelvic rocking and doming  Bridge: limited lumbar extension  Hip PROM: mod limitation IR/ER  MMT: sup hip: 4-/5 bilaterally  - Hamstrings: major tightness  - Piriformis: major tightness  Diaphragmatic Breathing: accessory breathing  Rib Palpation/Positioning: flared  Assess Abdomen  Abdominal Palpation: increased abdominal tissue L Lower abdomen  Transverse Abdominus Contraction: poor    Treatments:   Discussed:   - avoid holding breath with transitions (exhale)  - JICing  - healthy bladder habits  - Squeeze before you sneeze  - concern about increased abdominal tissue of LLQ abdomen - recommend discussion with PCP for further consult  - diarrhea management - imodium(follow instructions on package), metamucil, and call GI specialist for further consult  - urge suppression  - pressure management (exhale with transitions)    Access Code: [email or web address]  STRETCHING  - Supine Lower Trunk Rotation  - 3-5 x weekly - 10-30 reps  - Hooklying Single Knee to Chest Stretch  - 3-5 x weekly - 3 sets - 10 seconds hold  - Supine Pelvic Floor Stretch  - 3-5 x weekly - 3 sets - 5 breaths hold  - Supine Gluteus Stretch  - 3-5 x weekly - 3 sets - 20 seconds hold  - Supine Figure 4 Piriformis Stretch  - 3-5 x weekly - 3 sets - 20 seconds hold    STRENGTH  - Supine Pelvic Tilt  - 3-5 x weekly - 10 reps  - Hooklying Transversus Abdominis Palpation  - 3-5 x weekly - 5-10 reps - 2 breaths hold  - Supine Hip Adduction Isometric with Ball  - 3-5 x weekly - 3 sets - 10 reps  - Hooklying Clamshell with Resistance  - 3-5 x weekly - 3 sets - 10 reps  - Bridge  - 3-5 x weekly - 3 sets - 10 reps    Access Code: [email or web address]  - Seated Wrist Flexion Stretch  - 1 x daily - 3 sets - 15-20 seconds hold (extension, gravity assist)  - Seated Scapular Retraction  - 1-3 x daily - 10 reps - 5-10 seconds hold  - Seated Cervical Retraction  - 1-3 x daily - 10 reps - 5-10 seconds hold  - Supine  Pectoralis Stretch  - 1 x daily - 30-60 seconds hold    Plan for Next Visit: continued assessment  - behavioral training: bladder training, urge suppression, triggers  - hep: strength/control of PF/hips/core muscles    Assessment: Patient presents with signs and symptoms consistent with urinary incontinence, muscle weakness, abnormal gait, resulting in limited participation in pain-free ADLs and inability to perform at their prior level of function. Pt would benefit from physical therapy to address the impairments found & listed previously in the objective section in order to return to safe and pain-free ADLs and prior level of function.  - tolerated progression of PF/core/hip strengthening without increased pain or symptoms  - improved knowledge of pressure management with transitions    Plan:   Planned Interventions include: therapeutic exercise, self-care home management, manual therapy, therapeutic activities, gait training, neuromuscular coordination, aquatic therapy  Patient and/or family understands and agrees with goals and plan documented: yes  Frequency: 2x/month  Duration: 3-4 months     Jenny Olivares, PT

## 2024-10-14 DIAGNOSIS — E11.65 POORLY CONTROLLED DIABETES MELLITUS (MULTI): ICD-10-CM

## 2024-10-14 RX ORDER — INSULIN GLARGINE-YFGN 100 [IU]/ML
INJECTION, SOLUTION SUBCUTANEOUS
Qty: 95 ML | Refills: 1 | Status: SHIPPED | OUTPATIENT
Start: 2024-10-14 | End: 2025-10-14

## 2024-10-22 ENCOUNTER — APPOINTMENT (OUTPATIENT)
Dept: ORTHOPEDIC SURGERY | Facility: CLINIC | Age: 64
End: 2024-10-22
Payer: COMMERCIAL

## 2024-10-23 PROCEDURE — RXMED WILLOW AMBULATORY MEDICATION CHARGE

## 2024-10-25 ENCOUNTER — APPOINTMENT (OUTPATIENT)
Dept: PHYSICAL THERAPY | Facility: CLINIC | Age: 64
End: 2024-10-25
Payer: COMMERCIAL

## 2024-10-28 ENCOUNTER — PHARMACY VISIT (OUTPATIENT)
Dept: PHARMACY | Facility: CLINIC | Age: 64
End: 2024-10-28
Payer: COMMERCIAL

## 2024-10-29 DIAGNOSIS — G89.29 OTHER CHRONIC PAIN: ICD-10-CM

## 2024-10-31 RX ORDER — GABAPENTIN 300 MG/1
300 CAPSULE ORAL 2 TIMES DAILY
Qty: 180 CAPSULE | Refills: 3 | OUTPATIENT
Start: 2024-10-31

## 2024-11-01 ENCOUNTER — TELEPHONE (OUTPATIENT)
Dept: PRIMARY CARE | Facility: CLINIC | Age: 64
End: 2024-11-01
Payer: COMMERCIAL

## 2024-11-01 DIAGNOSIS — M79.642 PAIN IN BOTH HANDS: ICD-10-CM

## 2024-11-01 DIAGNOSIS — M79.646 PAIN OF FINGER, UNSPECIFIED LATERALITY: ICD-10-CM

## 2024-11-01 DIAGNOSIS — M79.603 UPPER EXTREMITY PAIN, LATERAL, UNSPECIFIED LATERALITY: Primary | ICD-10-CM

## 2024-11-01 DIAGNOSIS — M79.641 PAIN IN BOTH HANDS: ICD-10-CM

## 2024-11-01 DIAGNOSIS — M54.2 NECK PAIN: ICD-10-CM

## 2024-11-06 DIAGNOSIS — I10 HYPERTENSION, UNSPECIFIED TYPE: ICD-10-CM

## 2024-11-07 DIAGNOSIS — I10 HYPERTENSION, UNSPECIFIED TYPE: ICD-10-CM

## 2024-11-08 DIAGNOSIS — E11.3513 TYPE 2 DIABETES MELLITUS WITH BOTH EYES AFFECTED BY PROLIFERATIVE RETINOPATHY AND MACULAR EDEMA, WITHOUT LONG-TERM CURRENT USE OF INSULIN: ICD-10-CM

## 2024-11-08 DIAGNOSIS — E11.65 POORLY CONTROLLED DIABETES MELLITUS (MULTI): ICD-10-CM

## 2024-11-08 PROCEDURE — RXMED WILLOW AMBULATORY MEDICATION CHARGE

## 2024-11-08 RX ORDER — TIRZEPATIDE 10 MG/.5ML
1 INJECTION, SOLUTION SUBCUTANEOUS
Qty: 6 ML | Refills: 1 | Status: SHIPPED | OUTPATIENT
Start: 2024-11-10

## 2024-11-08 RX ORDER — INSULIN GLARGINE-YFGN 100 [IU]/ML
INJECTION, SOLUTION SUBCUTANEOUS
Qty: 95 ML | Refills: 1 | Status: SHIPPED | OUTPATIENT
Start: 2024-11-08

## 2024-11-08 RX ORDER — INSULIN ASPART 100 [IU]/ML
INJECTION, SOLUTION INTRAVENOUS; SUBCUTANEOUS
Qty: 45 ML | Refills: 1 | Status: SHIPPED | OUTPATIENT
Start: 2024-11-08

## 2024-11-11 ENCOUNTER — PATIENT MESSAGE (OUTPATIENT)
Dept: PRIMARY CARE | Facility: CLINIC | Age: 64
End: 2024-11-11
Payer: COMMERCIAL

## 2024-11-11 DIAGNOSIS — E78.5 HYPERLIPIDEMIA, UNSPECIFIED HYPERLIPIDEMIA TYPE: ICD-10-CM

## 2024-11-11 RX ORDER — ATORVASTATIN CALCIUM 80 MG/1
80 TABLET, FILM COATED ORAL DAILY
Qty: 90 TABLET | Refills: 3 | Status: SHIPPED | OUTPATIENT
Start: 2024-11-11 | End: 2025-11-11

## 2024-11-11 RX ORDER — ATORVASTATIN CALCIUM 80 MG/1
80 TABLET, FILM COATED ORAL DAILY
Qty: 90 TABLET | Refills: 3 | Status: SHIPPED | OUTPATIENT
Start: 2024-11-11 | End: 2024-11-11

## 2024-11-11 RX ORDER — AMLODIPINE BESYLATE 5 MG/1
5 TABLET ORAL DAILY
Qty: 90 TABLET | Refills: 3 | Status: SHIPPED | OUTPATIENT
Start: 2024-11-11

## 2024-11-12 ENCOUNTER — APPOINTMENT (OUTPATIENT)
Dept: PRIMARY CARE | Facility: CLINIC | Age: 64
End: 2024-11-12
Payer: COMMERCIAL

## 2024-11-14 ENCOUNTER — TREATMENT (OUTPATIENT)
Dept: PHYSICAL THERAPY | Facility: CLINIC | Age: 64
End: 2024-11-14
Payer: COMMERCIAL

## 2024-11-14 ENCOUNTER — APPOINTMENT (OUTPATIENT)
Dept: PHARMACY | Facility: HOSPITAL | Age: 64
End: 2024-11-14
Payer: COMMERCIAL

## 2024-11-14 DIAGNOSIS — E11.3513 TYPE 2 DIABETES MELLITUS WITH BOTH EYES AFFECTED BY PROLIFERATIVE RETINOPATHY AND MACULAR EDEMA, WITHOUT LONG-TERM CURRENT USE OF INSULIN: ICD-10-CM

## 2024-11-14 DIAGNOSIS — R32 URINARY INCONTINENCE, UNSPECIFIED TYPE: ICD-10-CM

## 2024-11-14 PROCEDURE — 97535 SELF CARE MNGMENT TRAINING: CPT | Mod: GP | Performed by: PHYSICAL THERAPIST

## 2024-11-14 PROCEDURE — RXMED WILLOW AMBULATORY MEDICATION CHARGE

## 2024-11-14 ASSESSMENT — ENCOUNTER SYMPTOMS
HEADACHES: 1
SWEATS: 1
DIZZINESS: 1

## 2024-11-14 NOTE — PROGRESS NOTES
Clinical Pharmacy Appointment  Subjective     Patient ID: Elina Garcia is a 64 y.o. female who presents for Diabetes.    Referring Provider: Bibi Bridges DO     Diabetes  She presents for her follow-up diabetic visit. She has type 2 diabetes mellitus. Her disease course has been improving. Hypoglycemia symptoms include dizziness, headaches and sweats. She is compliant with treatment most of the time.     Diet: Breakfast this morning sherwood and boiled eggs     Exercise: Takes walks frequently       Allergies   Allergen Reactions    Metformin Unknown       Objective     Current DM Pharmacotherapy:   Jardiance 10 mg - 1 tablet daily   Novolog Flex Pen - written to take per SSI up to 40 units daily  Average Units Injected: 12-14 AC   No SSI per endo  Semglee 100 u/mL Pen- 36 units twice daily   Pt taking Mornin units, Evening 42 units  Mounjaro 10 mg/0.5 mL - once weekly   Injection Day:      Clarifications to above regimen: Just started Mounjaro 10 mg - this week   Adverse Effects: None    SECONDARY PREVENTION  - Statin? Yes  Does pt have proteinuria? Yes If appropriate, is patient on ACEi/ARB? No, Cough from lisinopril  - Aspirin? Yes    Current monitoring regimen:   Patient is using: continuous glucose monitor - Dexcom G7     Testing frequency: CGM    SMBG Readings:     162 mg/dL during visit - breakfast was about 30 minutes ago     TIR  84%  High 12%    Any episodes of hypoglycemia? Yes, 60s .  Did patient treat episode of hypoglycemia appropriately? Yes, easily treated  Does the patient have a prescription for ready-to-use Glucagon? No        Pertinent PMH Review:  - PMH of Pancreatitis: No  - PMH/FH of Medullary Thyroid Cancer: No  - PMH/FH of Multiple Endocrine Neoplasia (MEN) Type II: No  - PMH of Retinopathy: Yes  - PMH of Urinary Tract Infections/Yeast Infections: No    Lab Review  BMP  Lab Results   Component Value Date    CALCIUM 9.8 2024     2024    K 4.3 2024     CO2 24 09/17/2024     09/17/2024    BUN 20 09/17/2024    CREATININE 0.87 09/17/2024    EGFR 75 09/17/2024     LFTs  Lab Results   Component Value Date    ALT 32 04/23/2024    AST 19 04/23/2024    ALKPHOS 102 04/23/2024    BILITOT 0.6 04/23/2024     FLP  Lab Results   Component Value Date    TRIG 170 (H) 09/17/2024    CHOL 165 09/17/2024    LDLF 114 (H) 02/16/2023    LDLCALC 84 09/17/2024    HDL 46.7 09/17/2024       The 10-year ASCVD risk score (Rosa CARLOS, et al., 2019) is: 18.1%    Values used to calculate the score:      Age: 64 years      Sex: Female      Is Non- : Yes      Diabetic: Yes      Tobacco smoker: No      Systolic Blood Pressure: 135 mmHg      Is BP treated: Yes      HDL Cholesterol: 46.7 mg/dL      Total Cholesterol: 165 mg/dL  Urine Microalbumin  Lab Results   Component Value Date    MICROALBCREA 477.1 (H) 09/17/2024     Weight Management  Wt Readings from Last 3 Encounters:   12/10/24 106 kg (232 lb 12.8 oz)   09/17/24 107 kg (235 lb 8 oz)   08/31/24 104 kg (229 lb 15 oz)      There is no height or weight on file to calculate BMI.   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, bilateral    Relevant Orders    Referral to Clinical Pharmacy        Patient Assistance Screening (VAF)    Patient verbally reports monthly or yearly income which is less than 400% federal poverty level    Application for program will be submitted for the following medications: Insulins and Mounjaro    Patient aware that they will need to provide appropriate proof of income documents: [facility] [employer] - e-mail sent to pt     Patient aware this process may take up to 2-4 weeks from time of submission and application will be submitted upon receipt of income documents      If approved, medication must be filled through [facility] pharmacy and may be picked up or mailed to patient. If approved, medication will be billed through insurance, and patient assistance team will pay the copay. This will result in a $0 copay for the patient.      Type 2 diabetes mellitus, is not at goal. Goal A1C: <7%    Follow up: I recommend diabetes care be 4 weeks  Endo Follow-Up: 11/21/24  PCP Follow-Up: Not currently scheduled    Aurora Aguirre PharmD [facility]  Clinical Pharmacy Specialist, Primary Care     Continue all meds under the continuation of care with the referring provider and clinical pharmacy team

## 2024-11-14 NOTE — PROGRESS NOTES
Physical Therapy  Physical Therapy Pelvic Floor    Name: Elina Garcia  MRN: [id number]  : 1960  Today's Date: 24     Time Calculation  Start Time: 1300  Stop Time: 1345  Time Calculation (min): 45 min    Insurance: Darrell ROBERT, no auth  Visit # 5 of 35 for     Current Problem:  1. Urinary incontinence, unspecified type  Follow Up In Physical Therapy          General     Precautions     Vital Signs     Pain       Subjective  Chief Complaint: urinary urgency and frequency, incontinence  - 4-5 years ago, uncontrollable diarrhea, occurred for 2 years, no urge, Side Effect of Medications, only time it happens at this time is with anxiety  - urinary leaking with coughing and sneezing, lifting, sometimes with walking  - Nocturia: 3-4x  - bilateral wrist pain from a fall - was going to OT/worker's comp, just recently stopped  Onset: 4-5 yrs ago  MONSE: unknown    Today:   - hep compliance: yes  -  less leaking of urine  - sleeping through the nights, some night  - trigger: pulling into the driveway    PAIN  Intensity (0-10): 4  Location: back pain, shoulder pain, R collar bone  Description: soreness, aching    Treatment Goals: control her bladder more    BLADDER = nocturia, PF tightness & weakness  BOWEL= discuss next visit  Frequency of Bowel Movements: regular  Management Techniques: metamucil      Objective  Patient was educated on pelvic floor anatomy, function, and dysfunction.   Patient was educated on an orthopedic assessment & external pelvic floor assessment technique and verbalized consent.   The patient is aware they have full autonomy and can stop the assessment at any time.     Standing Assessment:   Posture: rounded shoulders, increased thoracic kyphosis, increased lumbar lordosis  Sit to stand: observation, used bilateral UE assist  Gait: compensated trendelenburg bilaterally  SL Stance: <10 bilaterally  DL Squat: decreased depth  Standing Lumbar Mobility: major tightness all  directions  SLR: pelvic rocking and doming  Bridge: limited lumbar extension  Hip PROM: mod limitation IR/ER  MMT: sup hip: 4-/5 bilaterally  - Hamstrings: major tightness  - Piriformis: major tightness  Diaphragmatic Breathing: accessory breathing  Rib Palpation/Positioning: flared  Assess Abdomen  Abdominal Palpation: increased abdominal tissue L Lower abdomen  Transverse Abdominus Contraction: poor    Treatments:   Discussed:   - avoid holding breath with transitions (exhale)  - JICing  - healthy bladder habits  - Squeeze before you sneeze  - concern about increased abdominal tissue of LLQ abdomen - recommend discussion with PCP for further consult  - diarrhea management - imodium(follow instructions on package), metamucil, and call GI specialist for further consult  - bladder triggers & urge suppression  - pressure management (exhale with transitions)    Access Code: [email or web address]  STRETCHING  - Supine Lower Trunk Rotation  - 3-5 x weekly - 10-30 reps  - Hooklying Single Knee to Chest Stretch  - 3-5 x weekly - 3 sets - 10 seconds hold  - Supine Pelvic Floor Stretch  - 3-5 x weekly - 3 sets - 5 breaths hold  - Supine Gluteus Stretch  - 3-5 x weekly - 3 sets - 20 seconds hold  - Supine Figure 4 Piriformis Stretch  - 3-5 x weekly - 3 sets - 20 seconds hold    STRENGTH - increase repetitions to 30ech  - Supine Pelvic Tilt  - 3-5 x weekly - 10 reps  - Hooklying Transversus Abdominis Palpation  - 3-5 x weekly - 5-10 reps - 2 breaths hold  - Supine Hip Adduction Isometric with Ball  - 3-5 x weekly - 3 sets - 10 reps  - Hooklying Clamshell with Resistance  - 3-5 x weekly - 3 sets - 10 reps  - Bridge  - 3-5 x weekly - 3 sets - 10 reps    Access Code: [email or web address]  - Seated Wrist Flexion Stretch  - 1 x daily - 3 sets - 15-20 seconds hold (extension, gravity assist)  - Seated Scapular Retraction  - 1-3 x daily - 10 reps - 5-10 seconds hold  - Seated Cervical Retraction  - 1-3 x daily - 10 reps - 5-10 seconds hold  - Supine  Pectoralis Stretch  - 1 x daily - 30-60 seconds hold    Plan for Next Visit: continued assessment  - behavioral training: bladder training, urge suppression, triggers  - hep: strength/control of PF/hips/core muscles    Assessment: Patient presents with signs and symptoms consistent with urinary incontinence, muscle weakness, abnormal gait, resulting in limited participation in pain-free ADLs and inability to perform at their prior level of function. Pt would benefit from physical therapy to address the impairments found & listed previously in the objective section in order to return to safe and pain-free ADLs and prior level of function.  - improved reported urinary incontinence since last visit  - compliance with hep 2x/week  - improved knowledge of urge suppression and bladder triggers    Plan:   Planned Interventions include: therapeutic exercise, self-care home management, manual therapy, therapeutic activities, gait training, neuromuscular coordination, aquatic therapy  Patient and/or family understands and agrees with goals and plan documented: yes  Frequency: 2x/month  Duration: 3-4 months     Jenny Olivares, PT

## 2024-11-15 ENCOUNTER — PHARMACY VISIT (OUTPATIENT)
Dept: PHARMACY | Facility: CLINIC | Age: 64
End: 2024-11-15
Payer: COMMERCIAL

## 2024-11-15 PROCEDURE — RXMED WILLOW AMBULATORY MEDICATION CHARGE

## 2024-11-16 RX ORDER — METOPROLOL SUCCINATE 50 MG/1
50 TABLET, EXTENDED RELEASE ORAL DAILY
Qty: 90 TABLET | Refills: 3 | Status: SHIPPED | OUTPATIENT
Start: 2024-11-16

## 2024-11-21 ENCOUNTER — APPOINTMENT (OUTPATIENT)
Dept: ENDOCRINOLOGY | Facility: CLINIC | Age: 64
End: 2024-11-21
Payer: COMMERCIAL

## 2024-11-25 DIAGNOSIS — E11.65 POORLY CONTROLLED DIABETES MELLITUS (MULTI): Primary | ICD-10-CM

## 2024-11-25 PROCEDURE — RXMED WILLOW AMBULATORY MEDICATION CHARGE

## 2024-11-25 RX ORDER — PEN NEEDLE, DIABETIC 30 GX3/16"
NEEDLE, DISPOSABLE MISCELLANEOUS
Qty: 400 EACH | Refills: 1 | Status: SHIPPED | OUTPATIENT
Start: 2024-11-25

## 2024-11-29 ENCOUNTER — PHARMACY VISIT (OUTPATIENT)
Dept: PHARMACY | Facility: CLINIC | Age: 64
End: 2024-11-29
Payer: COMMERCIAL

## 2024-12-01 ASSESSMENT — CUP TO DISC RATIO
OS_RATIO: .3
OD_RATIO: .3

## 2024-12-01 ASSESSMENT — EXTERNAL EXAM - LEFT EYE: OS_EXAM: NORMAL

## 2024-12-01 ASSESSMENT — EXTERNAL EXAM - RIGHT EYE: OD_EXAM: NORMAL

## 2024-12-01 NOTE — PROGRESS NOTES
LOV with me 4/11/22      Combined form of age-related cataract, right eyeH25.811  Combined form of age-related cataract, left eyeH25.812  -Visually significant cataract right eye. BCVA: 20/300. Symptoms: Gradual decrease in vision x 6 months. Harder to read small print. More difficulty seeing small words/numbers on TV. Having more trouble seeing road signs when driving. Glare from headlights when driving at night. A change in glasses prescription will not result in significant visual improvement at this time.  Indication/anticipated outcome for cataract surgery: To potentially improve visual acuity and improve quality of life/reduce symptoms. To obtain a better view of the retina/optic nerve. To reduce anisometropia.  Based on a comprehensive eye exam performed December 2, 2024, a visually significant cataract appears to be the source of decreased vision, diminished quality of life, and impairment of activities of daily living. Discussed option of cataract surgery vs observation. Patient can no longer function adequately with current best corrected visual acuity and wishes to have cataract surgery at this time. Discussed surgical procedure with patient. Discussed potential risks, benefits, and complications of cataract surgery including but not limited to pain, bleeding, infection, inflammation, edema, increased eye pressure, retinal tear/detachment, lens dislocation, ptosis, iris damage, need for additional surgery, need for glasses after surgery, loss of vision/loss of eye. Patient understands and wishes to proceed. All questions were answered. Will schedule cataract surgery right eye. Will evaluate other eye following cataract surgery right eye. Lenstar done December 2, 2024.   Pentacam (12/2/24) - OD: Irregular. 41.5/41.7 @ 33.5. OS: Irregular. 40.8/41.4 @ 159.6.   Discussed IOL options (standard monofocal, toric, multifocal). Lens chosen: Standard monofocal. Defer/decline toric/multifocal lens at this  time.  Aim: [place] to -0.50. Had thorough discussion with patient re: aim. Discussed that may potentially need glasses for best vision both at distance and at near.   Dominance: right eye  Special considerations: Will plan to use trypan blue due to dense cataract and poor red reflex. May use epishugarcaine and possible Malyugin Ring/iris hooks if poor dilation on day of surgery.   Best contact number: [phone]  Drops:   -Ketorolac (or Diclofenac) and Ofloxacin 4x/day starting 1 day prior to surgery; Prednisolone acetate 1% 4x/day starting after surgery    PDR (proliferative diabetic retinopathy) both eyes with macular edema  Vitreous syneresis of both eyesH43.393  Vitreous hemorrhage, right eye  -HbA1c= 7.4 (9/17/24).   -OCT macula (12/2/24) - SS: 9/10 OS. OD: Unable to capture image. OS: Normal thickness and contour, no significant central edema. 225. Similar to 5/5/23.   -Continue close monitoring of blood glucose, blood pressure, and cholesterol.   -Last seen by Dr. Silver 5/5/23 - s/p PRP OU and anti VEGF. At last visit had active neovascularization of the disc (NVD) with vitreous hemorrhage OD. OS is s/p PPV EL 2/17/22 - Echegaray)    [id number]  -Continue OTC reading glasses  -Refraction performed today for diagnostic purposes only and without specific intent to dispense Rx. Glasses Rx not dispensed today.   -Defer new Rx. No significant improvement in vision with refraction at this time.         No history of refractive surgery.   No FH of AMD/glaucoma

## 2024-12-02 ENCOUNTER — APPOINTMENT (OUTPATIENT)
Dept: OPHTHALMOLOGY | Facility: CLINIC | Age: 64
End: 2024-12-02
Payer: COMMERCIAL

## 2024-12-02 DIAGNOSIS — E11.3513 PROLIFERATIVE DIABETIC RETINOPATHY OF BOTH EYES WITH MACULAR EDEMA ASSOCIATED WITH TYPE 2 DIABETES MELLITUS: ICD-10-CM

## 2024-12-02 DIAGNOSIS — H25.811 COMBINED FORM OF AGE-RELATED CATARACT, RIGHT EYE: Primary | ICD-10-CM

## 2024-12-02 DIAGNOSIS — H43.11 VITREOUS HEMORRHAGE, RIGHT EYE (MULTI): ICD-10-CM

## 2024-12-02 DIAGNOSIS — H25.812 COMBINED FORM OF AGE-RELATED CATARACT, LEFT EYE: ICD-10-CM

## 2024-12-02 DIAGNOSIS — H52.4 PRESBYOPIA: ICD-10-CM

## 2024-12-02 PROCEDURE — 99214 OFFICE O/P EST MOD 30 MIN: CPT | Performed by: OPHTHALMOLOGY

## 2024-12-02 PROCEDURE — 92134 CPTRZ OPH DX IMG PST SGM RTA: CPT | Performed by: OPHTHALMOLOGY

## 2024-12-02 PROCEDURE — 92136 OPHTHALMIC BIOMETRY: CPT | Performed by: OPHTHALMOLOGY

## 2024-12-02 PROCEDURE — 92136 OPHTHALMIC BIOMETRY: CPT | Mod: BILATERAL PROCEDURE | Performed by: OPHTHALMOLOGY

## 2024-12-02 RX ORDER — TETRACAINE HYDROCHLORIDE 5 MG/ML
1 SOLUTION OPHTHALMIC ONCE
OUTPATIENT
Start: 2024-12-02 | End: 2024-12-02

## 2024-12-02 RX ORDER — PHENYLEPHRINE HYDROCHLORIDE 100 MG/ML
1 SOLUTION/ DROPS OPHTHALMIC
OUTPATIENT
Start: 2024-12-02 | End: 2024-12-02

## 2024-12-02 RX ORDER — CYCLOPENTOLATE HYDROCHLORIDE 10 MG/ML
1 SOLUTION/ DROPS OPHTHALMIC
OUTPATIENT
Start: 2024-12-02 | End: 2024-12-02

## 2024-12-02 ASSESSMENT — SLIT LAMP EXAM - LIDS
COMMENTS: GOOD POSITION, DERMATOCHALASIS
COMMENTS: GOOD POSITION, DERMATOCHALASIS

## 2024-12-02 ASSESSMENT — ENCOUNTER SYMPTOMS
NEUROLOGICAL NEGATIVE: 0
CARDIOVASCULAR NEGATIVE: 0
ENDOCRINE NEGATIVE: 0
CONSTITUTIONAL NEGATIVE: 0
GASTROINTESTINAL NEGATIVE: 0
MUSCULOSKELETAL NEGATIVE: 0
PSYCHIATRIC NEGATIVE: 0
EYES NEGATIVE: 1
RESPIRATORY NEGATIVE: 0
ALLERGIC/IMMUNOLOGIC NEGATIVE: 0
HEMATOLOGIC/LYMPHATIC NEGATIVE: 0

## 2024-12-02 ASSESSMENT — REFRACTION_MANIFEST
OS_AXIS: 085
OS_ADD: +2.50
OS_AXIS: 085
METHOD_AUTOREFRACTION: 1
OS_SPHERE: +1.50
OS_CYLINDER: -0.50
OS_CYLINDER: -0.50
OS_SPHERE: +1.50
OD_ADD: +2.50
OD_SPHERE: NO TARGET
OD_CYLINDER: SPHERE
OD_SPHERE: PLANO

## 2024-12-02 ASSESSMENT — TONOMETRY
IOP_METHOD: GOLDMANN APPLANATION
OD_IOP_MMHG: 16
OS_IOP_MMHG: 16

## 2024-12-02 ASSESSMENT — VISUAL ACUITY
OD_SC: 20/300
OS_BAT_MED: 20/30
METHOD: SNELLEN - LINEAR
OS_SC: 20/40

## 2024-12-02 ASSESSMENT — REFRACTION_WEARINGRX
OS_SPHERE: LOST
OD_SPHERE: LOST

## 2024-12-10 ENCOUNTER — APPOINTMENT (OUTPATIENT)
Dept: UROLOGY | Facility: CLINIC | Age: 64
End: 2024-12-10
Payer: COMMERCIAL

## 2024-12-10 VITALS
HEART RATE: 96 BPM | TEMPERATURE: 96.5 F | SYSTOLIC BLOOD PRESSURE: 135 MMHG | WEIGHT: 232.8 LBS | DIASTOLIC BLOOD PRESSURE: 85 MMHG | BODY MASS INDEX: 35.28 KG/M2 | HEIGHT: 68 IN

## 2024-12-10 DIAGNOSIS — R31.29 MICROSCOPIC HEMATURIA: Primary | ICD-10-CM

## 2024-12-10 DIAGNOSIS — R39.15 URINARY URGENCY: ICD-10-CM

## 2024-12-10 DIAGNOSIS — R15.9 INCONTINENCE OF FECES, UNSPECIFIED FECAL INCONTINENCE TYPE: ICD-10-CM

## 2024-12-10 DIAGNOSIS — N39.46 MIXED STRESS AND URGE URINARY INCONTINENCE: ICD-10-CM

## 2024-12-10 PROCEDURE — 3051F HG A1C>EQUAL 7.0%<8.0%: CPT | Performed by: PHYSICIAN ASSISTANT

## 2024-12-10 PROCEDURE — 1036F TOBACCO NON-USER: CPT | Performed by: PHYSICIAN ASSISTANT

## 2024-12-10 PROCEDURE — 3075F SYST BP GE 130 - 139MM HG: CPT | Performed by: PHYSICIAN ASSISTANT

## 2024-12-10 PROCEDURE — 99214 OFFICE O/P EST MOD 30 MIN: CPT | Performed by: PHYSICIAN ASSISTANT

## 2024-12-10 PROCEDURE — 3008F BODY MASS INDEX DOCD: CPT | Performed by: PHYSICIAN ASSISTANT

## 2024-12-10 PROCEDURE — 3048F LDL-C <100 MG/DL: CPT | Performed by: PHYSICIAN ASSISTANT

## 2024-12-10 PROCEDURE — 3062F POS MACROALBUMINURIA REV: CPT | Performed by: PHYSICIAN ASSISTANT

## 2024-12-10 PROCEDURE — 3079F DIAST BP 80-89 MM HG: CPT | Performed by: PHYSICIAN ASSISTANT

## 2024-12-10 ASSESSMENT — PAIN SCALES - GENERAL: PAINLEVEL_OUTOF10: 0-NO PAIN

## 2024-12-10 NOTE — PROGRESS NOTES
[facility] [place]  Outpatient Clinic Note    Subjective   Elina Garcia is a 64 y.o. female with urinary urgency, frequency, urinary incontinence.     History of Present Illness:    Previous urologic workup:  > PVR by US 6/26/24: 0mL    Previous urologic therapy:  > Hiprex 1g BID 12/2018, Dr Sexton - patient unsure if she took this or just acute course of ATB, not current  > PFPT, completed    At last visit plan was for PFPT for HARINI.  Today she reports she is most bothered by diarrhea, fecal incontinence.  She reports last night she had multiple episodes of diarrhea, unsure if related to her diet but cannot identify specifics.  She reports diarrhea and fecal incontinence are more bothersome than her urinary symptoms.  She endorses fecal urgency.  She is managing with fiber supplementation 4 times per week.  She has had a colonoscopy.  She thinks she has seen GI in the past.    Patient feels her urinary symptoms are improved.  Nocturia down to 1 time per night.  Notes less urgency.  Urinary incontinence has improved although does note urge incontinence at night.  She is performing behavioral modifications such as decreasing bladder irritants.  She denies dysuria.    Her Pmhx, surgical history, meds, allergies were reviewed and updated as needed.     9/24/24: Today she reports mixed urinary incontinence. Describes JULIANNE with cough, laugh, sneeze. This happens daily, typically small volume. She endorses urge incontinence, urgency. UUI happens typically at night but also during the day. Voiding 3-4x in 8 hour work day, about q2-2.5 hours at baseline. Nocturia 2x/night, down from 3x with behavioral modifications and PFPT. She is using ~2 Depends/day. She denies dysuria. Denies hematuria.     Denies vaginal prolapse symptoms. Denies vaginal bleeding.     She endorses chronic fecal incontinence. Another reason as to why she wears depends. Experiences FI a few times per week. Typically loose stool, not formed. Has bowel  movements daily. Manages with dietary modifications, takes fiber ~4x/week. Per chart review colonoscopy in 2018, plan for repeat in 10 years.     Of note, patient known to Dr Sexton in 2019 for recurrent UTI, microscopic hematuria. Plan was for microscopic hematuria workup with imaging, cystoscopy. Patient does not recall undergoing any workup for this.     Past Medical History and Surgical History   Past Medical History:   Diagnosis Date    Abdominal tenderness, unspecified site 08/08/2018    Abdominal tenderness    Anesthesia of skin 07/11/2019    Numbness and tingling in both hands    Body mass index (BMI) 35.0-35.9, adult 05/20/2021    BMI 35.0-35.9,adult    Brain concussion Numerous head trauma events due to employment.  Last one was June 2023    Cataract     Diabetic retinopathy (Multi)     Epigastric pain 09/13/2018    Chronic epigastric pain    History of falling 06/13/2018    H/O fall    Neuropathy in diabetes (Multi)     Other specified noninflammatory disorders of vagina 05/31/2019    Vaginal irritation    Other symptoms and signs involving the nervous system 02/12/2021    Suspected sleep apnea    Pain in left shoulder 02/11/2021    Left shoulder pain    Pain in right knee 03/18/2017    Right knee pain    Pain in right thigh 03/18/2017    Pain of right thigh    Personal history of other diseases of the circulatory system 08/22/2019    History of hypertension    Personal history of other diseases of the circulatory system 12/06/2016    History of secondary hypertension    Personal history of other diseases of the musculoskeletal system and connective tissue 06/13/2018    History of low back pain    Personal history of other diseases of the nervous system and sense organs 02/12/2021    History of sleep apnea    Personal history of other diseases of urinary system 12/12/2019    History of hematuria    Personal history of other drug therapy 12/06/2016    History of pneumococcal vaccination    Personal history  of other drug therapy 08/30/2017    History of influenza vaccination    Personal history of other drug therapy 12/06/2016    History of influenza vaccination    Personal history of other endocrine, nutritional and metabolic disease     History of type 2 diabetes mellitus    Personal history of other endocrine, nutritional and metabolic disease     History of hyperlipidemia    Personal history of other endocrine, nutritional and metabolic disease 04/11/2022    History of diabetes mellitus    Personal history of other endocrine, nutritional and metabolic disease 11/30/2021    History of hyperlipidemia    Personal history of other specified conditions 05/13/2021    History of dysuria    Personal history of other specified conditions 06/20/2019    History of abdominal pain    Personal history of other specified conditions 06/20/2019    History of fever    Personal history of other specified conditions 07/02/2019    History of fatigue    Personal history of other specified conditions 08/08/2018    History of chest pain    Personal history of other specified conditions 12/06/2016    History of tachycardia    Personal history of traumatic brain injury     History of concussion    Personal history of urinary (tract) infections 12/12/2019    History of urinary tract infection    Sleep apnea     Unspecified abdominal pain 04/02/2019    Abdominal cramping    Unspecified multiple injuries, initial encounter 05/23/2018    Contusion, multiple sites    Unspecified urinary incontinence 05/02/2019    Urinary incontinence in female    Urinary tract infection      Past Surgical History:   Procedure Laterality Date    HYSTERECTOMY  12/06/2016    Hysterectomy    KNEE SURGERY  12/06/2016    Knee Surgery    PANRETINAL PHOTOCOAGULATION         Medications  Current Outpatient Medications on File Prior to Visit   Medication Sig Dispense Refill    amLODIPine (Norvasc) 5 mg tablet TAKE 1 TABLET BY MOUTH EVERY DAY 90 tablet 3    atorvastatin  "(Lipitor) 80 mg tablet Take 1 tablet (80 mg) by mouth once daily. 90 tablet 3    blood-glucose sensor (Dexcom G7 Sensor) device Change every 10 days 9 each 3    Dexcom G7  misc Use as instructed 1 each 0    empagliflozin (Jardiance) 10 mg Take 1 tablet (10 mg) by mouth once daily. 90 tablet 3    fluconazole (Diflucan) 150 mg tablet Take 1 tablet orally one time only.  May repeat in 3 days if needed. 2 tablet 0    gabapentin (Neurontin) 300 mg capsule Take 1 capsule (300 mg) by mouth 2 times a day. 180 capsule 3    insulin aspart (NovoLOG Flexpen U-100 Insulin) 100 unit/mL (3 mL) pen Inject with meals up to 40 units daily 45 mL 1    insulin glargine-yfgn (Semglee,insulin glarg-yfgn,Pen) 100 unit/mL (3 mL) Pen Take as directed per insulin instructions.INJECT 45 UNITS UNDER THE SKIN EVERY MORNING AND 50 UNITS EVERY EVENING 95 mL 1    metoprolol succinate XL (Toprol-XL) 50 mg 24 hr tablet TAKE 1 TABLET (50 MG) BY MOUTH ONCE DAILY. 90 tablet 3    multivitamin (Daily Multi-Vitamin) tablet Take 1 tablet by mouth.      pantoprazole (ProtoNix) 40 mg EC tablet TAKE 1 TABLET (40 MG) BY MOUTH ONCE DAILY. 90 tablet 0    pen needle, diabetic (BD Aspen 2nd Gen Pen Needle) 32 gauge x 5/32\" needle Use four times daily with insulin 400 each 1    tirzepatide (Mounjaro) 10 mg/0.5 mL pen injector Inject 10 mg under the skin 1 (one) time per week. 6 mL 1     No current facility-administered medications on file prior to visit.       Objective   Physicial Exam  Vitals:    12/10/24 0950   BP: 135/85   Pulse: 96   Temp: 35.8 °C (96.5 °F)     General: Well developed, well nourished, alert and cooperative, appears in no acute distress  Eyes: Non-injected conjunctiva, sclera clear, no proptosis  Cardiac: Extremities are warm and well perfused. No edema, cyanosis or pallor.   Lungs: Breathing is easy, non-labored. Speaking in clear and complete sentences. Normal diaphragmatic movement.  MSK: Ambulatory with steady gait, unassisted  Neuro: " alert and oriented to person, place and time  Psych: Demonstrates good judgement and reason, without hallucinations, abnormal affect or abnormal behaviors.  Skin: no obvious lesions, no rashes.    5/16/2024 microscopic urinalysis: 3-5 RBCs, 21-50 WBC  4/13/2024 microscopic urinalysis 1-2 RBCs, 1-5 WBC  1/24/2024 microscopic urinalysis 1-2 RBCs, 1-5 RBC        Chemistry    Lab Results   Component Value Date/Time     09/17/2024 0910    K 4.3 09/17/2024 0910     09/17/2024 0910    CO2 24 09/17/2024 0910    BUN 20 09/17/2024 0910    CREATININE 0.87 09/17/2024 0910    Lab Results   Component Value Date/Time    CALCIUM 9.8 09/17/2024 0910    ALKPHOS 102 04/23/2024 1110    AST 19 04/23/2024 1110    ALT 32 04/23/2024 1110    BILITOT 0.6 04/23/2024 1110        Lab Results   Component Value Date    HGBA1C 7.4 (H) 09/17/2024     Review of Systems  All other systems have been reviewed and are negative for complaint.    Assessment and Plan   Elina Garcia is a 64 y.o. female with mixed urinary incontinence, fecal incontinence. Per chart review, patient previously followed by Dr Sexton for microscopic hematuria with plan for workup but was not completed.     > Urinary urgency, frequency, UUI: We discussed management strategies including behavioral modifications which she is performing. She has attended PFPT with some improvement in her symptoms.   We did review medication as an option as well, not interested at this time.   We briefly reviewed third line therapies including Botox and sacral neuromodulation. SNS may improve fecal incontinence as well. She is not interested in procedural options at this time.     > Stress Urinary Incontinence: Not interested in further management at this time.    > Fecal incontinence: Continue daily fiber supplementation.  I encouraged patient to follow-up with her PCP or GI.  She has had a colonoscopy.  Minimal improvement with PFPT.     > Microscopic hematuria: Per chart review,   5/16/2024 microscopic urinalysis: 3-5 RBCs, 21-50 WBC. January 2024 and April 2024 microscopic UA with 1-2 RBCs and 1-5 WBC.      We discussed According to the [employer], microscopic hematuria is defined by the presence of three or more red blood cells per high-powered field on microscopic examination of one properly collected, non-contaminated urinalysis with no evidence of infection.  Based on May microscopic urinalysis with 3-5 RBCs, recommended diagnostic hematuria workup including: CT Urogram and cystoscopy. There is no history of renal dysfunction. Creatinine ordered if needed prior to study. Risks of cystoscopy were discussed with the patient in great detail, including risk of hematuria, UTI and discomfort. She was given patient education handouts from [facility].    All questions and concerns were addressed. Patient verbalizes understanding and has no other questions at this time.    Follow up: for cystoscopy with MD Heather Zhang PA-C  12/10/24 7:20 AM  Clinic phone: [phone], [phone]

## 2024-12-10 NOTE — PATIENT INSTRUCTIONS
Schedule cystoscopy in clinic - camera that looks inside the bladder  Schedule CT Urogram through [facility] or by calling radiology at [phone]. You may need blood work to check your renal function (creatinine), radiology will inform you if this is necessary but I have placed an order.  Follow up with PCP or GI regarding your bowel symptoms  Continue behavioral modifications for urinary symptoms

## 2024-12-12 ENCOUNTER — APPOINTMENT (OUTPATIENT)
Dept: PHARMACY | Facility: HOSPITAL | Age: 64
End: 2024-12-12
Payer: COMMERCIAL

## 2024-12-12 ENCOUNTER — OFFICE VISIT (OUTPATIENT)
Dept: OPHTHALMOLOGY | Facility: CLINIC | Age: 64
End: 2024-12-12
Payer: COMMERCIAL

## 2024-12-12 DIAGNOSIS — E11.3513 TYPE 2 DIABETES MELLITUS WITH BOTH EYES AFFECTED BY PROLIFERATIVE RETINOPATHY AND MACULAR EDEMA, WITHOUT LONG-TERM CURRENT USE OF INSULIN: Primary | ICD-10-CM

## 2024-12-12 DIAGNOSIS — H43.11 VITREOUS HEMORRHAGE, RIGHT EYE (MULTI): Primary | ICD-10-CM

## 2024-12-12 DIAGNOSIS — E11.3513 PROLIFERATIVE DIABETIC RETINOPATHY OF BOTH EYES WITH MACULAR EDEMA ASSOCIATED WITH TYPE 2 DIABETES MELLITUS: ICD-10-CM

## 2024-12-12 PROCEDURE — 92134 CPTRZ OPH DX IMG PST SGM RTA: CPT

## 2024-12-12 PROCEDURE — 99214 OFFICE O/P EST MOD 30 MIN: CPT

## 2024-12-12 ASSESSMENT — ENCOUNTER SYMPTOMS
RESPIRATORY NEGATIVE: 0
GASTROINTESTINAL NEGATIVE: 0
NEUROLOGICAL NEGATIVE: 0
HEADACHES: 1
ALLERGIC/IMMUNOLOGIC NEGATIVE: 0
CONSTITUTIONAL NEGATIVE: 0
DIZZINESS: 1
EYES NEGATIVE: 1
MUSCULOSKELETAL NEGATIVE: 0
PSYCHIATRIC NEGATIVE: 0
CARDIOVASCULAR NEGATIVE: 0
HEMATOLOGIC/LYMPHATIC NEGATIVE: 0
SWEATS: 1
ENDOCRINE NEGATIVE: 0

## 2024-12-12 ASSESSMENT — EXTERNAL EXAM - RIGHT EYE: OD_EXAM: NORMAL

## 2024-12-12 ASSESSMENT — VISUAL ACUITY
OD_SC+: -2
OS_SC: 20/25
METHOD: SNELLEN - LINEAR
OD_SC: 20/50
OS_SC+: -2

## 2024-12-12 ASSESSMENT — TONOMETRY
OS_IOP_MMHG: 16
OD_IOP_MMHG: 16
IOP_METHOD: TONOPEN

## 2024-12-12 ASSESSMENT — SLIT LAMP EXAM - LIDS
COMMENTS: GOOD POSITION, DERMATOCHALASIS
COMMENTS: GOOD POSITION, DERMATOCHALASIS

## 2024-12-12 ASSESSMENT — EXTERNAL EXAM - LEFT EYE: OS_EXAM: NORMAL

## 2024-12-12 ASSESSMENT — CUP TO DISC RATIO
OD_RATIO: .3
OS_RATIO: .3

## 2024-12-12 NOTE — ASSESSMENT & PLAN NOTE
Home BG improved, 79% TIR. Having a few lows if she takes morning Novolog and does not immediately eat. Discussed that short acting insulin should not be taken if she is not going to eat within 10-15 minutes.     Will defer further titration/changes until January with busy schedule upcoming for patient.     CONTINUE   Jardiance 10 mg - 1 tablet daily   Novolog Flex Pen - written to take per SSI up to 40 units daily  Average Units Injected: 12-14 AC   No SSI per endo  Semglee 100 u/mL Pen- 36 units twice daily   Mornin units, Evening 42 units  Mounjaro 10 mg/0.5 mL - once weekly     Further Mounjaro titration will likely require down titration of insulin.

## 2024-12-12 NOTE — PROGRESS NOTES
Proliferative Diabetic Retinopathy OU   -DM II for 30 years  - HbA1c= 7.4 (9/17/24).     Right eye:   - NVE s/p PRP (as PRP looks adquated) and anti VEGF   - NVD; active today (noted to be regressed on prior exams)   - VH 2+ (inferior)     - OCT 12/12/24  show trace DME (stable), paracentral RPE, EZ disruption    Impression/plan  - Stable no ci- DME OD   - Discussed options of treatment prior to CE surgery. Pt wishes to proceed with injection prior to surgery)   - Will get PA for ALESSANDRO   -RTC 1 week prior to the surgery.      Left eye:   S/p PPV EL OS for DR/Recurrnet Vh (JOSWALD)   Stable     OCT - No DME; scattered HE   20/40 UCVA   If no improvement, consider CEIOL   RTC 3-4 months    Combined form of age-related cataract, right eyeH25.811  Combined form of age-related cataract, left eyeH25.812

## 2024-12-12 NOTE — PROGRESS NOTES
Clinical Pharmacy Appointment  Subjective     Patient ID: Elina Garcia is a 64 y.o. female who presents for Diabetes.    Referring Provider: Bibi Bridges,      Diabetes  She presents for her follow-up diabetic visit. She has type 2 diabetes mellitus. Her disease course has been improving. Hypoglycemia symptoms include dizziness, headaches and sweats. She is compliant with treatment most of the time.     Diet: Less of an appetite- lost 6 lbs     Exercise: Takes walks frequently       Allergies   Allergen Reactions    Metformin Unknown       Objective     Current DM Pharmacotherapy:   Jardiance 10 mg - 1 tablet daily   Novolog Flex Pen - written to take per SSI up to 40 units daily  Average Units Injected: 12-14 AC   No SSI per endo  Semglee 100 u/mL Pen- 36 units twice daily   Pt taking Mornin units, Evening 42 units  Mounjaro 10 mg/0.5 mL - once weekly   Injection Day:      Clarifications to above regimen: None   Adverse Effects: Some diarrhea     SECONDARY PREVENTION  - Statin? Yes  Does pt have proteinuria? Yes If appropriate, is patient on ACEi/ARB? No, Cough from lisinopril  - Aspirin? Yes    Current monitoring regimen:   Patient is using: continuous glucose monitor - Dexcom G7     Testing frequency: CGM    SMBG Readings:     76 mg/dL FBG this morning    LOW 1%  TIR 30 days- 79%  TIR 90 days- 61%    Any episodes of hypoglycemia? Yes, 60s .  Did patient treat episode of hypoglycemia appropriately? Yes, easily treated  Does the patient have a prescription for ready-to-use Glucagon? No        Pertinent PMH Review:  - PMH of Pancreatitis: No  - PMH/FH of Medullary Thyroid Cancer: No  - PMH/FH of Multiple Endocrine Neoplasia (MEN) Type II: No  - PMH of Retinopathy: Yes  - PMH of Urinary Tract Infections/Yeast Infections: No    Lab Review  BMP  Lab Results   Component Value Date    CALCIUM 9.8 2024     2024    K 4.3 2024    CO2 24 2024     2024    BUN 20  2024    CREATININE 0.87 2024    EGFR 75 2024     LFTs  Lab Results   Component Value Date    ALT 32 2024    AST 19 2024    ALKPHOS 102 2024    BILITOT 0.6 2024     FLP  Lab Results   Component Value Date    TRIG 170 (H) 2024    CHOL 165 2024    LDLF 114 (H) 2023    LDLCALC 84 2024    HDL 46.7 2024       The 10-year ASCVD risk score (Rosa CARLOS, et al., 2019) is: 18.1%    Values used to calculate the score:      Age: 64 years      Sex: Female      Is Non- : Yes      Diabetic: Yes      Tobacco smoker: No      Systolic Blood Pressure: 135 mmHg      Is BP treated: Yes      HDL Cholesterol: 46.7 mg/dL      Total Cholesterol: 165 mg/dL  Urine Microalbumin  Lab Results   Component Value Date    MICROALBCREA 477.1 (H) 2024     Weight Management  Wt Readings from Last 3 Encounters:   12/10/24 106 kg (232 lb 12.8 oz)   24 107 kg (235 lb 8 oz)   24 104 kg (229 lb 15 oz)      There is no height or weight on file to calculate BMI.   A1C  Lab Results   Component Value Date    HGBA1C 7.4 (H) 2024    HGBA1C 8.5 (A) 2024    HGBA1C 8.5 (H) 2024     Assessment/Plan     Problem List Items Addressed This Visit       Type 2 diabetes mellitus with proliferative diabetic retinopathy with macular edema, bilateral - Primary     Home BG improved, 79% TIR. Having a few lows if she takes morning Novolog and does not immediately eat. Discussed that short acting insulin should not be taken if she is not going to eat within 10-15 minutes.     Will defer further titration/changes until January with busy schedule upcoming for patient.     CONTINUE   Jardiance 10 mg - 1 tablet daily   Novolog Flex Pen - written to take per SSI up to 40 units daily  Average Units Injected: 12-14 AC   No SSI per endo  Semglee 100 u/mL Pen- 36 units twice daily   Mornin units, Evening 42 units  Mounjaro 10 mg/0.5 mL - once weekly      Further Mounjaro titration will likely require down titration of insulin.                Patient Assistance Screening (VAF)    Patient verbally reports monthly or yearly income which is less than 400% federal poverty level    Application for program will be submitted for the following medications: Insulins and Mounjaro    Patient aware that they will need to provide appropriate proof of income documents: Federal 1040 - e-mail sent to pt again today    Patient aware this process may take up to 2-4 weeks from time of submission and application will be submitted upon receipt of income documents     If approved, medication must be filled through [facility] pharmacy and may be picked up or mailed to patient. If approved, medication will be billed through insurance, and patient assistance team will pay the copay. This will result in a $0 copay for the patient.      Type 2 diabetes mellitus, is not at goal. Goal A1C: <7%    Follow up: I recommend diabetes care be 4 weeks  Endo Follow-Up: 3/20/2025  PCP Follow-Up: Not currently scheduled    Aurora Aguirre PharmD [facility]  Clinical Pharmacy Specialist, Primary Care     Continue all meds under the continuation of care with the referring provider and clinical pharmacy team

## 2024-12-16 ENCOUNTER — APPOINTMENT (OUTPATIENT)
Dept: OPHTHALMOLOGY | Facility: CLINIC | Age: 64
End: 2024-12-16
Payer: COMMERCIAL

## 2024-12-18 NOTE — PROGRESS NOTES
"  Patient is a 64 y.o. female presenting for cystoscopic evaluation of microscopic hematuria    SUBJECTIVE:  HPI   She was referred for cystoscopic evaluation by Heather Zhang PA-C on December 10, 2024 for her microscopic hematuria. She has history of mixed stress and urge incontinence, and urinary urgency. There is a CT urography order in from December 10, 2024 (Heather Zhang PA-C). She does not use nicotine. She reports no known history of allergy to antibiotics. She takes atorvastatin.      No results found for: \"URINECULTURE\"     Past Medical History:   Diagnosis Date    Abdominal tenderness, unspecified site 08/08/2018    Abdominal tenderness    Anesthesia of skin 07/11/2019    Numbness and tingling in both hands    Body mass index (BMI) 35.0-35.9, adult 05/20/2021    BMI 35.0-35.9,adult    Brain concussion Numerous head trauma events due to employment.  Last one was June 2023    Cataract     Diabetic retinopathy (Multi)     Epigastric pain 09/13/2018    Chronic epigastric pain    History of falling 06/13/2018    H/O fall    Neuropathy in diabetes (Multi)     Other specified noninflammatory disorders of vagina 05/31/2019    Vaginal irritation    Other symptoms and signs involving the nervous system 02/12/2021    Suspected sleep apnea    Pain in left shoulder 02/11/2021    Left shoulder pain    Pain in right knee 03/18/2017    Right knee pain    Pain in right thigh 03/18/2017    Pain of right thigh    Personal history of other diseases of the circulatory system 08/22/2019    History of hypertension    Personal history of other diseases of the circulatory system 12/06/2016    History of secondary hypertension    Personal history of other diseases of the musculoskeletal system and connective tissue 06/13/2018    History of low back pain    Personal history of other diseases of the nervous system and sense organs 02/12/2021    History of sleep apnea    Personal history of other diseases of urinary system " 12/12/2019    History of hematuria    Personal history of other drug therapy 12/06/2016    History of pneumococcal vaccination    Personal history of other drug therapy 08/30/2017    History of influenza vaccination    Personal history of other drug therapy 12/06/2016    History of influenza vaccination    Personal history of other endocrine, nutritional and metabolic disease     History of type 2 diabetes mellitus    Personal history of other endocrine, nutritional and metabolic disease     History of hyperlipidemia    Personal history of other endocrine, nutritional and metabolic disease 04/11/2022    History of diabetes mellitus    Personal history of other endocrine, nutritional and metabolic disease 11/30/2021    History of hyperlipidemia    Personal history of other specified conditions 05/13/2021    History of dysuria    Personal history of other specified conditions 06/20/2019    History of abdominal pain    Personal history of other specified conditions 06/20/2019    History of fever    Personal history of other specified conditions 07/02/2019    History of fatigue    Personal history of other specified conditions 08/08/2018    History of chest pain    Personal history of other specified conditions 12/06/2016    History of tachycardia    Personal history of traumatic brain injury     History of concussion    Personal history of urinary (tract) infections 12/12/2019    History of urinary tract infection    Sleep apnea     Unspecified abdominal pain 04/02/2019    Abdominal cramping    Unspecified multiple injuries, initial encounter 05/23/2018    Contusion, multiple sites    Unspecified urinary incontinence 05/02/2019    Urinary incontinence in female    Urinary tract infection       Past Surgical History:   Procedure Laterality Date    HYSTERECTOMY  12/06/2016    Hysterectomy    KNEE SURGERY  12/06/2016    Knee Surgery    PANRETINAL PHOTOCOAGULATION        Family History   Problem Relation Name Age of Onset     Other (cardiac disorder) Mother Stacy     Diabetes Mother Stacy     Hypertension Mother Stacy     Alzheimer's disease Mother Stacy     Dementia Mother Stacy     Urinary tract infection Mother Stacy     ALS Father Cologne     Hypertension Father Cologne     Diabetes Brother      Macular degeneration Neg Hx      Glaucoma Neg Hx        Social History     Socioeconomic History    Marital status: Significant Other   Tobacco Use    Smoking status: Never    Smokeless tobacco: Never   Vaping Use    Vaping status: Never Used   Substance and Sexual Activity    Alcohol use: Not Currently    Drug use: Never    Sexual activity: Yes     Partners: Female     Birth control/protection: None      Review of Systems   Constitutional: denies any unintentional weight loss or change in strength.  Integumentary: denies any rashes or pruritus.  Eyes: denies any double vision or eye pain.  Ear/Nose/Mouth/Throat: denies any nosebleeds or gum bleeds.  Cardiovascular: denies any chest pain or syncope.  Respiratory: denies hemoptysis.  Gastrointestinal: denies nausea or vomiting.  Musculoskeletal: denies muscle cramping or weakness.  Neurologic: denies convulsions or seizures.  Hematologic/Lymphatic: denies bleeding tendencies.  Endocrine: denies heat/cold intolerance.  All other systems have been reviewed and are negative unless otherwise noted in the HPI.    OBJECTIVE:  Visit Vitals  /80   Pulse 96   Temp 36.2 °C (97.2 °F)     Physical Exam   Constitutional: No obvious distress.  Eyes: Non-injected conjunctiva, sclera clear, EOMI.  Ears/Nose/Mouth/Throat: No obvious drainage per ears or nose.  Cardiovascular: Extremities are warm and well perfused. No edema, cyanosis or pallor.  Respiratory: No audible wheezing/stridor; respirations do not appear labored.  Gastrointestinal: Abdomen soft, not distended.  Musculoskeletal: Normal ROM of extremities.  Skin: No obvious rashes or open sores.  Neurologic: Alert and  "oriented, CN 2-12 grossly intact.  Psychiatric: Answers questions appropriately with normal affect.  Hematologic/Lymphatic/Immunologic: No obvious bruises or sites of spontaneous bleeding.  Genitourinary: No CVA tenderness, bladder not palpable.     Labs:  Lab Results   Component Value Date    WBC 6.4 01/23/2024    HGB 12.9 01/23/2024    HCT 40.4 01/23/2024     01/23/2024    CHOL 165 09/17/2024    TRIG 170 (H) 09/17/2024    HDL 46.7 09/17/2024    ALT 32 04/23/2024    AST 19 04/23/2024     09/17/2024    K 4.3 09/17/2024     09/17/2024    CREATININE 0.87 09/17/2024    BUN 20 09/17/2024    CO2 24 09/17/2024    TSH 1.23 05/16/2024    INR 1.9 (H) 06/20/2019    HGBA1C 7.4 (H) 09/17/2024     No results found for: \"KPSAT\", \"KPSAP\"  IMAGING:      PROCEDURES:  Cystoscopy    Date/Time: 12/19/2024 1:30 PM    Performed by: John Milner MD  Authorized by: John Milner MD    Procedure - Bladder Cystoscopy:     Procedure details: cystoscopy    Post-procedure:     Patient tolerance: Patient tolerated the procedure well with no immediate complications      Comments:      No bladder tumors or stones. She had some descent of the urethra with coughing. No stress incontinence identified. She had a grade 2 cystocele.      ASSESSMENT/PLAN:  Problem List Items Addressed This Visit       Microscopic hematuria - Primary    Relevant Orders    Cystoscopy     Other Visit Diagnoses       Prophylactic antibiotic               She has history of microscopic hematuria. She underwent cystoscopic evaluation today. Risks including infection and bleeding were reviewed. Post-procedure instructions reviewed. She was given prophylactic Keflex x2 doses. She will follow up with her provider soon.    All questions were answered to the patient’s satisfaction.  Patient agrees with the plan and wishes to proceed.  Follow-up will be scheduled appropriately.     Scribe Attestation  By signing my name below, IRashida, Leeroye, "   attest that this documentation has been prepared under the direction and in the presence of John Milner MD.

## 2024-12-19 ENCOUNTER — EVALUATION (OUTPATIENT)
Dept: PHYSICAL THERAPY | Facility: CLINIC | Age: 64
End: 2024-12-19
Payer: COMMERCIAL

## 2024-12-19 ENCOUNTER — APPOINTMENT (OUTPATIENT)
Dept: UROLOGY | Facility: CLINIC | Age: 64
End: 2024-12-19
Payer: COMMERCIAL

## 2024-12-19 VITALS
WEIGHT: 232 LBS | HEIGHT: 68 IN | BODY MASS INDEX: 35.16 KG/M2 | TEMPERATURE: 97.2 F | DIASTOLIC BLOOD PRESSURE: 80 MMHG | SYSTOLIC BLOOD PRESSURE: 122 MMHG | HEART RATE: 96 BPM

## 2024-12-19 DIAGNOSIS — R31.29 MICROSCOPIC HEMATURIA: Primary | ICD-10-CM

## 2024-12-19 DIAGNOSIS — M79.603 UPPER EXTREMITY PAIN, LATERAL, UNSPECIFIED LATERALITY: Primary | ICD-10-CM

## 2024-12-19 DIAGNOSIS — M54.2 NECK PAIN: ICD-10-CM

## 2024-12-19 DIAGNOSIS — M79.642 PAIN IN BOTH HANDS: ICD-10-CM

## 2024-12-19 DIAGNOSIS — Z79.2 PROPHYLACTIC ANTIBIOTIC: ICD-10-CM

## 2024-12-19 DIAGNOSIS — M79.641 PAIN IN BOTH HANDS: ICD-10-CM

## 2024-12-19 DIAGNOSIS — M79.646 PAIN OF FINGER, UNSPECIFIED LATERALITY: ICD-10-CM

## 2024-12-19 PROCEDURE — 97162 PT EVAL MOD COMPLEX 30 MIN: CPT | Mod: GP

## 2024-12-19 PROCEDURE — 97110 THERAPEUTIC EXERCISES: CPT | Mod: GP

## 2024-12-19 PROCEDURE — 52000 CYSTOURETHROSCOPY: CPT | Performed by: UROLOGY

## 2024-12-19 RX ORDER — CEPHALEXIN 500 MG/1
500 CAPSULE ORAL 2 TIMES DAILY
Qty: 2 CAPSULE | Refills: 0 | Status: SHIPPED | OUTPATIENT
Start: 2024-12-19 | End: 2024-12-20

## 2024-12-19 ASSESSMENT — PAIN - FUNCTIONAL ASSESSMENT: PAIN_FUNCTIONAL_ASSESSMENT: 0-10

## 2024-12-19 ASSESSMENT — ENCOUNTER SYMPTOMS
OCCASIONAL FEELINGS OF UNSTEADINESS: 1
LOSS OF SENSATION IN FEET: 1
DEPRESSION: 0

## 2024-12-19 ASSESSMENT — PAIN SCALES - GENERAL: PAINLEVEL_OUTOF10: 0-NO PAIN
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
## 2024-12-20 DIAGNOSIS — K21.9 GASTROESOPHAGEAL REFLUX DISEASE WITHOUT ESOPHAGITIS: ICD-10-CM

## 2024-12-27 DIAGNOSIS — G89.29 OTHER CHRONIC PAIN: ICD-10-CM

## 2024-12-30 RX ORDER — GABAPENTIN 300 MG/1
300 CAPSULE ORAL 2 TIMES DAILY
Qty: 180 CAPSULE | Refills: 0 | Status: SHIPPED | OUTPATIENT
Start: 2025-01-28

## 2024-12-30 RX ORDER — PANTOPRAZOLE SODIUM 40 MG/1
40 TABLET, DELAYED RELEASE ORAL DAILY
Qty: 90 TABLET | Refills: 0 | Status: SHIPPED | OUTPATIENT
Start: 2024-12-30

## 2024-12-31 ENCOUNTER — HOSPITAL ENCOUNTER (OUTPATIENT)
Dept: RADIOLOGY | Facility: HOSPITAL | Age: 64
Discharge: HOME | End: 2024-12-31
Payer: COMMERCIAL

## 2024-12-31 DIAGNOSIS — R31.29 MICROSCOPIC HEMATURIA: ICD-10-CM

## 2024-12-31 LAB
CREAT SERPL-MCNC: 0.7 MG/DL (ref 0.6–1.3)
GFR SERPL CREATININE-BSD FRML MDRD: >90 ML/MIN/1.73M*2

## 2024-12-31 PROCEDURE — 82565 ASSAY OF CREATININE: CPT

## 2024-12-31 PROCEDURE — 2550000001 HC RX 255 CONTRASTS: Performed by: PHYSICIAN ASSISTANT

## 2024-12-31 PROCEDURE — 76377 3D RENDER W/INTRP POSTPROCES: CPT

## 2024-12-31 RX ADMIN — IOHEXOL 75 ML: 350 INJECTION, SOLUTION INTRAVENOUS at 08:18

## 2025-01-02 ENCOUNTER — APPOINTMENT (OUTPATIENT)
Dept: PHARMACY | Facility: HOSPITAL | Age: 65
End: 2025-01-02
Payer: COMMERCIAL

## 2025-01-02 DIAGNOSIS — E11.3513 TYPE 2 DIABETES MELLITUS WITH BOTH EYES AFFECTED BY PROLIFERATIVE RETINOPATHY AND MACULAR EDEMA, WITHOUT LONG-TERM CURRENT USE OF INSULIN: ICD-10-CM

## 2025-01-02 ASSESSMENT — ENCOUNTER SYMPTOMS
HEADACHES: 1
SWEATS: 1
DIZZINESS: 1

## 2025-01-02 NOTE — ASSESSMENT & PLAN NOTE
Patient's goal A1c is < 7%.  Is pt at goal? No, 7.4%  Patient's SMBGs are: 30-day TIR 75%.     Rationale for plan: Patient reports Mounjaro has been causing significant appetite suppression. Preference to continue current therapy.     Medication Changes:  CONTINUE  Jardiance 10 mg - 1 tablet daily   Novolog Flex Pen - written to take per SSI up to 40 units daily  Average Units Injected: 12-14 AC   No SSI per endo  Semglee 100 u/mL Pen- 36 units twice daily   Pt taking Morning: <150 mg/dL 32-35 units , Evening 28 units if BG <130 mg/dL   Mounjaro 10 mg/0.5 mL - once weekly   Injection Day: Sunday     Future Considerations:  May consider further Jardiance/Mounjaro titration with plan to decrease insulin simultaneously

## 2025-01-02 NOTE — PROGRESS NOTES
Clinical Pharmacy Appointment  Subjective     Patient ID: Elina Garcia is a 64 y.o. female who presents for Diabetes.    Referring Provider: Bibi Bridges DO     Diabetes  She presents for her follow-up diabetic visit. She has type 2 diabetes mellitus. Her disease course has been improving. Hypoglycemia symptoms include dizziness, headaches and sweats. She is compliant with treatment most of the time.     Diet: Less of an appetite- lost 8 lbs (Reports home weight of 230 lbs)     Exercise: Takes walks frequently       Allergies   Allergen Reactions    Metformin Unknown       Objective     Current DM Pharmacotherapy:   Jardiance 10 mg - 1 tablet daily   Novolog Flex Pen - written to take per SSI up to 40 units daily  Average Units Injected: 12-14 AC   No SSI per endo  Semglee 100 u/mL Pen- 36 units twice daily   Pt taking Morning: <150 mg/dL 32-35 units , Evening 28 units if BG <130 mg/dL   Mounjaro 10 mg/0.5 mL - once weekly   Injection Day: Sunday     Clarifications to above regimen: None   Adverse Effects: None    SECONDARY PREVENTION  - Statin? Yes  Does pt have proteinuria? Yes If appropriate, is patient on ACEi/ARB? No, Cough from lisinopril  - Aspirin? Yes    Current monitoring regimen:   Patient is using: continuous glucose monitor - Dexcom G7     Testing frequency: CGM    SMBG Readings:     TIR 30 days- 75%    Any episodes of hypoglycemia? Yes, 60s .  Did patient treat episode of hypoglycemia appropriately? Yes, easily treated  Does the patient have a prescription for ready-to-use Glucagon? No        Pertinent PMH Review:  - PMH of Pancreatitis: No  - PMH/FH of Medullary Thyroid Cancer: No  - PMH/FH of Multiple Endocrine Neoplasia (MEN) Type II: No  - PMH of Retinopathy: Yes  - PMH of Urinary Tract Infections/Yeast Infections: No    Lab Review  BMP  Lab Results   Component Value Date    CALCIUM 9.8 09/17/2024     09/17/2024    K 4.3 09/17/2024    CO2 24 09/17/2024     09/17/2024    BUN  20 09/17/2024    CREATININE 0.87 09/17/2024    EGFR >90 12/31/2024     LFTs  Lab Results   Component Value Date    ALT 32 04/23/2024    AST 19 04/23/2024    ALKPHOS 102 04/23/2024    BILITOT 0.6 04/23/2024     FLP  Lab Results   Component Value Date    TRIG 170 (H) 09/17/2024    CHOL 165 09/17/2024    LDLF 114 (H) 02/16/2023    LDLCALC 84 09/17/2024    HDL 46.7 09/17/2024       The 10-year ASCVD risk score (Rosa CARLOS, et al., 2019) is: 14%    Values used to calculate the score:      Age: 64 years      Sex: Female      Is Non- : Yes      Diabetic: Yes      Tobacco smoker: No      Systolic Blood Pressure: 122 mmHg      Is BP treated: Yes      HDL Cholesterol: 46.7 mg/dL      Total Cholesterol: 165 mg/dL  Urine Microalbumin  Lab Results   Component Value Date    MICROALBCREA 477.1 (H) 09/17/2024     Weight Management  Wt Readings from Last 3 Encounters:   12/19/24 105 kg (232 lb)   12/10/24 106 kg (232 lb 12.8 oz)   09/17/24 107 kg (235 lb 8 oz)      There is no height or weight on file to calculate BMI.   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  Is pt at goal? No, 7.4%  Patient's SMBGs are: 30-day TIR 75%.     Rationale for plan: Patient reports Mounjaro has been causing significant appetite suppression. Preference to continue current therapy.     Medication Changes:  CONTINUE  Jardiance 10 mg - 1 tablet daily   Novolog Flex Pen - written to take per SSI up to 40 units daily  Average Units Injected: 12-14 AC   No SSI per endo  Semglee 100 u/mL Pen- 36 units twice daily   Pt taking Morning: <150 mg/dL 32-35 units , Evening 28 units if BG <130 mg/dL   Mounjaro 10 mg/0.5 mL - once weekly   Injection Day: Sunday      Future Considerations:  May consider further Jardiance/Mounjaro titration with plan to decrease insulin simultaneously            Relevant Orders    Hemoglobin A1c            Patient Assistance Screening (VAF)    Patient verbally reports monthly or yearly income which is less than 400% federal poverty level    Application for program will be submitted for the following medications: Insulins and Mounjaro    Patient aware that they will need to provide appropriate proof of income documents: Federal 1040 - e-mail sent to  - states she will send documents today    Patient aware this process may take up to 2-4 weeks from time of submission and application will be submitted upon receipt of income documents     If approved, medication must be filled through [facility] pharmacy and may be picked up or mailed to patient. If approved, medication will be billed through insurance, and patient assistance team will pay the copay. This will result in a $0 copay for the patient.      Follow up: I recommend diabetes care be 4 weeks  Endo Follow-Up: 3/20/2025  PCP Follow-Up: Not currently scheduled    Aurora Aguirre PharmD [facility]  Clinical Pharmacy Specialist, Primary Care     Continue all meds under the continuation of care with the referring provider and clinical pharmacy team

## 2025-01-06 NOTE — PROGRESS NOTES
"  [facility]  Outpatient Clinic Note    Subjective   Elina Garcia is a 64 y.o. female presenting for follow up microscopic hematuria.     History of Present Illness:    Previous urologic workup:  > PVR by US 6/26/24: 0mL    Previous urologic therapy:  > Hiprex 1g BID 12/2018, Dr Sexton - patient unsure if she took this or just acute course of ATB, not current  > PFPT, completed  > CT Urogram 12/31/24 - negative for stones, masses, hydro  > Cystoscopy 12/19/24, Dr Milner, negative for stone, tumor - notable for stage II cystocele     At last visit plan was to proceed with workup for microscopic hematuria.  Patient underwent cystoscopy with Dr. Milner which was negative.  CT urogram without evidence of stones or masses.     Today she reports on Sunday she had excessive fecal incontinence. FI typically happens about 1x/month. Used 8 depends in one day for FI. FI of liquid stool. Unsure if able to identify dietary triggers. She took two imodium. Bowels then returned to normal on Monday. Has had regular bowel movements since then. She continues to do fiber supplementation daily.   Urinary symptoms are improved overall. She continues to do exercises learned from PFPT at home. +Key in door urgency. UUI daily, \"not like it was\". Denies dysuria, hematuria.  Patient denies vaginal prolapse symptoms.     Her Pmhx, surgical history, meds, allergies were reviewed and updated as needed.     12/10/24:At last visit plan was for PFPT for HARINI.  Today she reports she is most bothered by diarrhea, fecal incontinence.  She reports last night she had multiple episodes of diarrhea, unsure if related to her diet but cannot identify specifics.  She reports diarrhea and fecal incontinence are more bothersome than her urinary symptoms.  She endorses fecal urgency.  She is managing with fiber supplementation 4 times per week.  She has had a colonoscopy.  She thinks she has seen GI in the past.  Patient feels her urinary " symptoms are improved.  Nocturia down to 1 time per night.  Notes less urgency.  Urinary incontinence has improved although does note urge incontinence at night.  She is performing behavioral modifications such as decreasing bladder irritants.  She denies dysuria.    9/24/24: Today she reports mixed urinary incontinence. Describes JULIANNE with cough, laugh, sneeze. This happens daily, typically small volume. She endorses urge incontinence, urgency. UUI happens typically at night but also during the day. Voiding 3-4x in 8 hour work day, about q2-2.5 hours at baseline. Nocturia 2x/night, down from 3x with behavioral modifications and PFPT. She is using ~2 Depends/day. She denies dysuria. Denies hematuria.   Denies vaginal prolapse symptoms. Denies vaginal bleeding.   She endorses chronic fecal incontinence. Another reason as to why she wears depends. Experiences FI a few times per week. Typically loose stool, not formed. Has bowel movements daily. Manages with dietary modifications, takes fiber ~4x/week. Per chart review colonoscopy in 2018, plan for repeat in 10 years.   Of note, patient known to Dr Sexton in 2019 for recurrent UTI, microscopic hematuria. Plan was for microscopic hematuria workup with imaging, cystoscopy. Patient does not recall undergoing any workup for this.     Past Medical History and Surgical History   Past Medical History:   Diagnosis Date    Abdominal tenderness, unspecified site 08/08/2018    Abdominal tenderness    Anesthesia of skin 07/11/2019    Numbness and tingling in both hands    Body mass index (BMI) 35.0-35.9, adult 05/20/2021    BMI 35.0-35.9,adult    Brain concussion Numerous head trauma events due to employment.  Last one was June 2023    Cataract     Diabetic retinopathy (Multi)     Epigastric pain 09/13/2018    Chronic epigastric pain    History of falling 06/13/2018    H/O fall    Neuropathy in diabetes (Multi)     Other specified noninflammatory disorders of vagina 05/31/2019     Vaginal irritation    Other symptoms and signs involving the nervous system 02/12/2021    Suspected sleep apnea    Pain in left shoulder 02/11/2021    Left shoulder pain    Pain in right knee 03/18/2017    Right knee pain    Pain in right thigh 03/18/2017    Pain of right thigh    Personal history of other diseases of the circulatory system 08/22/2019    History of hypertension    Personal history of other diseases of the circulatory system 12/06/2016    History of secondary hypertension    Personal history of other diseases of the musculoskeletal system and connective tissue 06/13/2018    History of low back pain    Personal history of other diseases of the nervous system and sense organs 02/12/2021    History of sleep apnea    Personal history of other diseases of urinary system 12/12/2019    History of hematuria    Personal history of other drug therapy 12/06/2016    History of pneumococcal vaccination    Personal history of other drug therapy 08/30/2017    History of influenza vaccination    Personal history of other drug therapy 12/06/2016    History of influenza vaccination    Personal history of other endocrine, nutritional and metabolic disease     History of type 2 diabetes mellitus    Personal history of other endocrine, nutritional and metabolic disease     History of hyperlipidemia    Personal history of other endocrine, nutritional and metabolic disease 04/11/2022    History of diabetes mellitus    Personal history of other endocrine, nutritional and metabolic disease 11/30/2021    History of hyperlipidemia    Personal history of other specified conditions 05/13/2021    History of dysuria    Personal history of other specified conditions 06/20/2019    History of abdominal pain    Personal history of other specified conditions 06/20/2019    History of fever    Personal history of other specified conditions 07/02/2019    History of fatigue    Personal history of other specified conditions 08/08/2018     History of chest pain    Personal history of other specified conditions 12/06/2016    History of tachycardia    Personal history of traumatic brain injury     History of concussion    Personal history of urinary (tract) infections 12/12/2019    History of urinary tract infection    Sleep apnea     Unspecified abdominal pain 04/02/2019    Abdominal cramping    Unspecified multiple injuries, initial encounter 05/23/2018    Contusion, multiple sites    Unspecified urinary incontinence 05/02/2019    Urinary incontinence in female    Urinary tract infection      Past Surgical History:   Procedure Laterality Date    HYSTERECTOMY  12/06/2016    Hysterectomy    KNEE SURGERY  12/06/2016    Knee Surgery    PANRETINAL PHOTOCOAGULATION         Medications  Current Outpatient Medications on File Prior to Visit   Medication Sig Dispense Refill    amLODIPine (Norvasc) 5 mg tablet TAKE 1 TABLET BY MOUTH EVERY DAY 90 tablet 3    atorvastatin (Lipitor) 80 mg tablet Take 1 tablet (80 mg) by mouth once daily. 90 tablet 3    blood-glucose sensor (Dexcom G7 Sensor) device Change every 10 days 9 each 3    Dexcom G7  misc Use as instructed 1 each 0    empagliflozin (Jardiance) 10 mg Take 1 tablet (10 mg) by mouth once daily. 90 tablet 3    fluconazole (Diflucan) 150 mg tablet Take 1 tablet orally one time only.  May repeat in 3 days if needed. 2 tablet 0    [START ON 1/28/2025] gabapentin (Neurontin) 300 mg capsule Take 1 capsule (300 mg) by mouth 2 times a day. Do not fill before January 28, 2025. 180 capsule 0    insulin aspart (NovoLOG Flexpen U-100 Insulin) 100 unit/mL (3 mL) pen Inject with meals up to 40 units daily 45 mL 1    insulin glargine-yfgn (Semglee,insulin glarg-yfgn,Pen) 100 unit/mL (3 mL) Pen Take as directed per insulin instructions.INJECT 45 UNITS UNDER THE SKIN EVERY MORNING AND 50 UNITS EVERY EVENING 95 mL 1    metoprolol succinate XL (Toprol-XL) 50 mg 24 hr tablet TAKE 1 TABLET (50 MG) BY MOUTH ONCE DAILY. 90  "tablet 3    multivitamin (Daily Multi-Vitamin) tablet Take 1 tablet by mouth.      pantoprazole (ProtoNix) 40 mg EC tablet Take 1 tablet (40 mg) by mouth once daily. 90 tablet 0    pen needle, diabetic (BD Aspen 2nd Gen Pen Needle) 32 gauge x 5/32\" needle Use four times daily with insulin 400 each 1    tirzepatide (Mounjaro) 10 mg/0.5 mL pen injector Inject 10 mg under the skin 1 (one) time per week. 6 mL 1     No current facility-administered medications on file prior to visit.       Objective   Physicial Exam  Vitals:    01/08/25 1330   Temp: 36.3 °C (97.3 °F)     General: Well developed, well nourished, alert and cooperative, appears in no acute distress  Eyes: Non-injected conjunctiva, sclera clear, no proptosis  Cardiac: No edema, cyanosis or pallor.   Lungs: Breathing is easy, non-labored. Speaking in clear and complete sentences. Normal diaphragmatic movement.  MSK: Ambulatory with steady gait, unassisted  Neuro: alert and oriented to person, place and time  Psych: Demonstrates good judgement and reason, without hallucinations, abnormal affect or abnormal behaviors.  Skin: no obvious lesions, no rashes.    5/16/2024 microscopic urinalysis: 3-5 RBCs, 21-50 WBC  4/13/2024 microscopic urinalysis 1-2 RBCs, 1-5 WBC  1/24/2024 microscopic urinalysis 1-2 RBCs, 1-5 RBC    No results found for: \"URINECULTURE\"      Chemistry    Lab Results   Component Value Date/Time     09/17/2024 0910    K 4.3 09/17/2024 0910     09/17/2024 0910    CO2 24 09/17/2024 0910    BUN 20 09/17/2024 0910    CREATININE 0.87 09/17/2024 0910    Lab Results   Component Value Date/Time    CALCIUM 9.8 09/17/2024 0910    ALKPHOS 102 04/23/2024 1110    AST 19 04/23/2024 1110    ALT 32 04/23/2024 1110    BILITOT 0.6 04/23/2024 1110        Lab Results   Component Value Date    HGBA1C 7.4 (H) 09/17/2024     CT Urogram 12/31/24:  FINDINGS:  KIDNEYS, URETERS, AND BLADDER:  Unenhanced images show few small central renal calcifications " which  more likely are small vascular calcifications. No definite renal  calculi. No ureteral calculi bilaterally. No urinary bladder calculi.  Scattered small pelvic phleboliths are present.      There is symmetric renal cortical enhancement. Central renal  subcentimeter parapelvic cysts are present bilaterally. No enhancing  renal lesion bilaterally.      On delayed phase, there is symmetric renal contrast excretion with  good contrast opacification of the renal collecting systems. Segments  of both distal ureters are not fully opacified on delayed series. No  hydroureteronephrosis bilaterally.  No renal collecting system or  ureteral filling defect is identified.      Urinary bladder appears normal in configuration. No intraluminal mass  is seen. No appreciable significant bladder wall thickening.      3D reformations show no hydroureteronephrosis bilaterally. Depicted  portion of the urinary bladder is unremarkable.      LOWER CHEST:  Lung bases are clear.      ABDOMEN:      LIVER:  Within normal limits.      BILE DUCTS:  Nondilated.      GALLBLADDER:  The gallbladder is not distended and without calcified stones.      PANCREAS:  Within normal limits.      SPLEEN:  Within normal limits.      ADRENAL GLANDS:  Within normal limits.      VESSELS:  Scattered small atherosclerotic calcifications are present in the  aorta and branch vessels. No aortic aneurysm. IVC is unremarkable.      BOWEL:  There is no bowel obstruction or appreciable bowel wall thickening.  Appendix is normal.      No focal diverticular disease.      PERITONEUM/RETROPERITONEUM/LYMPH NODES:  No ascites or free air, no fluid collection.      There has been a hysterectomy.      No retroperitoneal fluid collection or lymphadenopathy.      ABDOMINAL WALL:  Unremarkable.      BONE AND SOFT TISSUE:  Thoracolumbar mild dextroscoliosis is centered near the thoracolumbar  junction. There is moderate disc space narrowing and endplate  spurring/sclerosis of  L5-S1 along with a small vacuum disc. Mild  multilevel disc space narrowing and endplate spurring present  throughout the remaining visualized spine. There is a stable nonacute  mild compression deformity of the T11 vertebral body with mild  superior endplate depression and anterior wedging. Lower lumbar mild  facet arthrosis is present. There is joint space narrowing and  marginal spurring of both hips.      IMPRESSION:  Few central renal small calcifications more likely are vascular  calcifications. No definite renal calculi. No ureteral calculi  bilaterally.      Subcentimeter central renal parapelvic cysts bilaterally. No  enhancing renal lesion.      No hydroureteronephrosis bilaterally. No renal collecting system or  ureteral filling defect identified.      No appreciable urinary bladder abnormality.    Review of Systems  All other systems have been reviewed and are negative for complaint.    Assessment and Plan   Elina Garcia is a 64 y.o. female with mixed urinary incontinence, fecal incontinence. Per chart review, patient previously followed by Dr Sexton for microscopic hematuria with plan for workup but was not completed.     > Microscopic hematuria: Patient has completed workup with cystoscopy, CT urogram. CT urogram negative for stones, masses, hydro. Will plan for annual urinalysis. Patient to contact clinic with gross hematuria.     > Urinary urgency, frequency, UUI: She has attended PFPT with some improvement in her symptoms. We did review medication as an option as well, not interested at this time. She is not interested in procedural options at this time.     > Stress Urinary Incontinence: Not interested in further management at this time.    > Fecal incontinence: Continue daily fiber supplementation.  I encouraged patient to follow-up with her PCP or GI.  She has had a colonoscopy.  Minimal improvement with PFPT. GI referral placed today.    All questions and concerns were addressed. Patient  verbalizes understanding and has no other questions at this time.    Follow up: Patient elects for PRN follow up. Would recommend annual urinalysis in 1 year.     Heather Zhang PA-C  01/08/25 4:27 PM  Clinic phone: [phone], [phone]

## 2025-01-08 ENCOUNTER — OFFICE VISIT (OUTPATIENT)
Dept: UROLOGY | Facility: CLINIC | Age: 65
End: 2025-01-08
Payer: COMMERCIAL

## 2025-01-08 VITALS — HEIGHT: 68 IN | TEMPERATURE: 97.3 F | WEIGHT: 238 LBS | BODY MASS INDEX: 36.07 KG/M2

## 2025-01-08 DIAGNOSIS — N39.46 MIXED STRESS AND URGE URINARY INCONTINENCE: ICD-10-CM

## 2025-01-08 DIAGNOSIS — R31.29 MICROSCOPIC HEMATURIA: Primary | ICD-10-CM

## 2025-01-08 DIAGNOSIS — R15.9 INCONTINENCE OF FECES, UNSPECIFIED FECAL INCONTINENCE TYPE: ICD-10-CM

## 2025-01-08 PROCEDURE — 1036F TOBACCO NON-USER: CPT | Performed by: PHYSICIAN ASSISTANT

## 2025-01-08 PROCEDURE — 99213 OFFICE O/P EST LOW 20 MIN: CPT | Performed by: PHYSICIAN ASSISTANT

## 2025-01-08 PROCEDURE — 3008F BODY MASS INDEX DOCD: CPT | Performed by: PHYSICIAN ASSISTANT

## 2025-01-08 ASSESSMENT — PAIN SCALES - GENERAL: PAINLEVEL_OUTOF10: 0-NO PAIN

## 2025-01-13 DIAGNOSIS — H25.812 COMBINED FORM OF AGE-RELATED CATARACT, LEFT EYE: ICD-10-CM

## 2025-01-13 DIAGNOSIS — H25.811 COMBINED FORM OF AGE-RELATED CATARACT, RIGHT EYE: Primary | ICD-10-CM

## 2025-01-13 RX ORDER — OFLOXACIN 3 MG/ML
SOLUTION/ DROPS OPHTHALMIC
Qty: 5 ML | Refills: 2 | Status: SHIPPED | OUTPATIENT
Start: 2025-01-13

## 2025-01-13 RX ORDER — KETOROLAC TROMETHAMINE 5 MG/ML
SOLUTION OPHTHALMIC
Qty: 5 ML | Refills: 2 | Status: SHIPPED | OUTPATIENT
Start: 2025-01-13

## 2025-01-13 RX ORDER — PREDNISOLONE ACETATE 10 MG/ML
SUSPENSION/ DROPS OPHTHALMIC
Qty: 5 ML | Refills: 2 | Status: SHIPPED | OUTPATIENT
Start: 2025-01-13

## 2025-01-16 ENCOUNTER — DOCUMENTATION (OUTPATIENT)
Dept: PHYSICAL THERAPY | Facility: CLINIC | Age: 65
End: 2025-01-16
Payer: COMMERCIAL

## 2025-01-16 ENCOUNTER — APPOINTMENT (OUTPATIENT)
Dept: PHYSICAL THERAPY | Facility: CLINIC | Age: 65
End: 2025-01-16
Payer: COMMERCIAL

## 2025-01-16 NOTE — PROGRESS NOTES
Physical Therapy                 Therapy Communication Note    Patient Name: Elina Garcia  MRN: [id number]  Department:   Room: Room/bed info not found  Today's Date: 1/16/2025     Discipline: Physical Therapy          Missed Visit Reason:  canceled through my chart, no reason listed    Missed Time: Cancel    Comment:

## 2025-01-17 ENCOUNTER — TELEPHONE (OUTPATIENT)
Dept: OPHTHALMOLOGY | Facility: HOSPITAL | Age: 65
End: 2025-01-17

## 2025-01-17 ENCOUNTER — OFFICE VISIT (OUTPATIENT)
Dept: OPHTHALMOLOGY | Facility: CLINIC | Age: 65
End: 2025-01-17
Payer: COMMERCIAL

## 2025-01-17 ENCOUNTER — APPOINTMENT (OUTPATIENT)
Dept: OPHTHALMOLOGY | Facility: CLINIC | Age: 65
End: 2025-01-17
Payer: COMMERCIAL

## 2025-01-17 DIAGNOSIS — H43.391 VITREOUS FLOATERS OF RIGHT EYE: Primary | ICD-10-CM

## 2025-01-17 DIAGNOSIS — H43.11 VITREOUS HEMORRHAGE, RIGHT EYE (MULTI): ICD-10-CM

## 2025-01-17 DIAGNOSIS — E11.3511 PROLIFERATIVE DIABETIC RETINOPATHY OF RIGHT EYE WITH MACULAR EDEMA DETERMINED BY EXAMINATION ASSOCIATED WITH TYPE 2 DIABETES MELLITUS: Primary | ICD-10-CM

## 2025-01-17 PROCEDURE — 67028 INJECTION EYE DRUG: CPT | Mod: RIGHT SIDE

## 2025-01-17 PROCEDURE — 92134 CPTRZ OPH DX IMG PST SGM RTA: CPT

## 2025-01-17 PROCEDURE — 99024 POSTOP FOLLOW-UP VISIT: CPT | Performed by: STUDENT IN AN ORGANIZED HEALTH CARE EDUCATION/TRAINING PROGRAM

## 2025-01-17 PROCEDURE — 92250 FUNDUS PHOTOGRAPHY W/I&R: CPT | Performed by: STUDENT IN AN ORGANIZED HEALTH CARE EDUCATION/TRAINING PROGRAM

## 2025-01-17 RX ORDER — CIPROFLOXACIN HYDROCHLORIDE 500 MG/1
1.25 TABLET ORAL
Status: COMPLETED | OUTPATIENT
Start: 2025-01-17 | End: 2025-01-17

## 2025-01-17 RX ADMIN — CIPROFLOXACIN HYDROCHLORIDE 1.25 MG: 500 TABLET ORAL at 12:28

## 2025-01-17 ASSESSMENT — VISUAL ACUITY
OD_SC+: -1
OS_SC: 20/25
OD_SC: 20/40
OS_SC+: -2
OD_SC: 20/50
METHOD: SNELLEN - LINEAR
METHOD: SNELLEN - LINEAR
OD_SC+: -2
OS_SC: 20/25
OS_SC+: -2

## 2025-01-17 ASSESSMENT — CUP TO DISC RATIO: OD_RATIO: .3

## 2025-01-17 ASSESSMENT — ENCOUNTER SYMPTOMS
EYES NEGATIVE: 1
ENDOCRINE NEGATIVE: 0
MUSCULOSKELETAL NEGATIVE: 0
CONSTITUTIONAL NEGATIVE: 0
CARDIOVASCULAR NEGATIVE: 0
HEMATOLOGIC/LYMPHATIC NEGATIVE: 0
PSYCHIATRIC NEGATIVE: 0
NEUROLOGICAL NEGATIVE: 0
ALLERGIC/IMMUNOLOGIC NEGATIVE: 0
EYES NEGATIVE: 1
RESPIRATORY NEGATIVE: 0
GASTROINTESTINAL NEGATIVE: 0

## 2025-01-17 ASSESSMENT — TONOMETRY
IOP_METHOD: GOLDMANN APPLANATION
OS_IOP_MMHG: 15
OD_IOP_MMHG: 15

## 2025-01-17 ASSESSMENT — EXTERNAL EXAM - LEFT EYE: OS_EXAM: NORMAL

## 2025-01-17 ASSESSMENT — EXTERNAL EXAM - RIGHT EYE: OD_EXAM: NORMAL

## 2025-01-17 ASSESSMENT — SLIT LAMP EXAM - LIDS
COMMENTS: GOOD POSITION, DERMATOCHALASIS
COMMENTS: GOOD POSITION, DERMATOCHALASIS

## 2025-01-17 NOTE — PROGRESS NOTES
Patient reports for add on evaluation with concern for a single isolated floater right eye after her intra-vitreal injection this morning in the inferior visual field.  No signs of holes/tears/detachments on exam  Eye is white and quiet   Vitreal medication is present-patient is noticing this in her vision  Discussed signs and symtpoms of retinal hole, tear, detachment. Patient educated that retinal detachment can lead to permanent vision loss. Patient consents to return if they notice new floaters, flashes, curtain or veil covering vision.  Discussed to RTC for any vision changes or pain    F/u as scheduled with retina

## 2025-01-17 NOTE — TELEPHONE ENCOUNTER
Patient recently had eye injection earlier today with Dr. Silver. Called secretaries complaining of new large floater in injected eye. Says she never experienced it before. I called patient to discuss and triage symptoms but patient did not . Left VM telling patient to call again if possible to discuss. Also told her that she is more than welcome to come though the ED at [facility] to see the ophthalmology resident on call if she is worried about her symptoms.

## 2025-01-17 NOTE — PROGRESS NOTES
Proliferative Diabetic Retinopathy OU   -DM II for 30 years  - HbA1c= 7.4 (9/17/24).     Right eye:   - NVE s/p PRP (as PRP looks adquated) and anti VEGF   - NVD; active today (noted to be regressed on prior exams)   - VH 2+ (inferior)     - OCT 01/17/25  show trace DME (stable), paracentral RPE, EZ disruption    Impression/plan  - Stable no ci- DME OD   - Discussed options of treatment prior to CE surgery. Pt wishes to proceed with injection prior to surgery)   - Will get PA for ALESSANDOR   - ALESSANDRO OD done   -RTC 1 week prior to the surgery.      Left eye:   S/p PPV EL OS for DR/Recurrnet Vh (JJ)   Stable     OCT - No DME; scattered HE   20/40 UCVA   If no improvement, consider CEIOL   RTC 3-4 months    Combined form of age-related cataract, right eyeH25.811  Combined form of age-related cataract, left eyeH25.812

## 2025-01-21 ENCOUNTER — DOCUMENTATION (OUTPATIENT)
Dept: PHYSICAL THERAPY | Facility: CLINIC | Age: 65
End: 2025-01-21
Payer: COMMERCIAL

## 2025-01-21 ENCOUNTER — APPOINTMENT (OUTPATIENT)
Dept: PHYSICAL THERAPY | Facility: CLINIC | Age: 65
End: 2025-01-21
Payer: COMMERCIAL

## 2025-01-21 NOTE — PROGRESS NOTES
Physical Therapy                 Therapy Communication Note    Patient Name: Elina Garcia  MRN: [id number]  Department:   Room: Room/bed info not found  Today's Date: 1/21/2025     Discipline: Physical Therapy          Missed Visit Reason:  canceled, weather is too bad    Missed Time: Cancel    Comment:

## 2025-01-22 ENCOUNTER — ANESTHESIA EVENT (OUTPATIENT)
Dept: OPERATING ROOM | Facility: CLINIC | Age: 65
End: 2025-01-22
Payer: COMMERCIAL

## 2025-01-23 ENCOUNTER — HOSPITAL ENCOUNTER (OUTPATIENT)
Facility: CLINIC | Age: 65
Setting detail: OUTPATIENT SURGERY
Discharge: HOME | End: 2025-01-23
Attending: OPHTHALMOLOGY | Admitting: OPHTHALMOLOGY
Payer: COMMERCIAL

## 2025-01-23 ENCOUNTER — ANESTHESIA (OUTPATIENT)
Dept: OPERATING ROOM | Facility: CLINIC | Age: 65
End: 2025-01-23
Payer: COMMERCIAL

## 2025-01-23 VITALS
SYSTOLIC BLOOD PRESSURE: 140 MMHG | OXYGEN SATURATION: 98 % | HEIGHT: 68 IN | DIASTOLIC BLOOD PRESSURE: 80 MMHG | HEART RATE: 93 BPM | RESPIRATION RATE: 16 BRPM | BODY MASS INDEX: 35.85 KG/M2 | WEIGHT: 236.55 LBS | TEMPERATURE: 97.9 F

## 2025-01-23 DIAGNOSIS — H25.811 COMBINED FORM OF AGE-RELATED CATARACT, RIGHT EYE: Primary | ICD-10-CM

## 2025-01-23 PROBLEM — R07.9 CHEST PAIN: Status: RESOLVED | Noted: 2024-05-16 | Resolved: 2025-01-23

## 2025-01-23 PROBLEM — N18.6 ESRD (END STAGE RENAL DISEASE) (MULTI): Status: ACTIVE | Noted: 2025-01-23

## 2025-01-23 PROBLEM — N39.0 RECURRENT UTI: Status: RESOLVED | Noted: 2023-10-20 | Resolved: 2025-01-23

## 2025-01-23 PROBLEM — Z95.0 PACEMAKER: Status: ACTIVE | Noted: 2025-01-23

## 2025-01-23 PROBLEM — J18.9 PNEUMONIA: Status: RESOLVED | Noted: 2023-12-27 | Resolved: 2025-01-23

## 2025-01-23 PROBLEM — B49 INFECTION DUE TO FUNGUS: Status: RESOLVED | Noted: 2024-01-23 | Resolved: 2025-01-23

## 2025-01-23 PROBLEM — R11.2 PONV (POSTOPERATIVE NAUSEA AND VOMITING): Status: ACTIVE | Noted: 2025-01-23

## 2025-01-23 PROBLEM — Z98.890 PONV (POSTOPERATIVE NAUSEA AND VOMITING): Status: ACTIVE | Noted: 2025-01-23

## 2025-01-23 PROBLEM — U07.1 COVID-19 VIRUS INFECTION: Status: RESOLVED | Noted: 2023-10-20 | Resolved: 2025-01-23

## 2025-01-23 PROBLEM — Z84.89 FAMILY HISTORY OF MALIGNANT HYPERTHERMIA: Status: ACTIVE | Noted: 2025-01-23

## 2025-01-23 PROBLEM — K27.9 PUD (PEPTIC ULCER DISEASE): Status: ACTIVE | Noted: 2025-01-23

## 2025-01-23 LAB — GLUCOSE BLD MANUAL STRIP-MCNC: 152 MG/DL (ref 74–99)

## 2025-01-23 PROCEDURE — 2500000005 HC RX 250 GENERAL PHARMACY W/O HCPCS: Performed by: OPHTHALMOLOGY

## 2025-01-23 PROCEDURE — 2500000004 HC RX 250 GENERAL PHARMACY W/ HCPCS (ALT 636 FOR OP/ED): Performed by: NURSE ANESTHETIST, CERTIFIED REGISTERED

## 2025-01-23 PROCEDURE — 3700000001 HC GENERAL ANESTHESIA TIME - INITIAL BASE CHARGE: Performed by: OPHTHALMOLOGY

## 2025-01-23 PROCEDURE — V2632 POST CHMBR INTRAOCULAR LENS: HCPCS | Performed by: OPHTHALMOLOGY

## 2025-01-23 PROCEDURE — 82947 ASSAY GLUCOSE BLOOD QUANT: CPT

## 2025-01-23 PROCEDURE — 2500000001 HC RX 250 WO HCPCS SELF ADMINISTERED DRUGS (ALT 637 FOR MEDICARE OP): Performed by: OPHTHALMOLOGY

## 2025-01-23 PROCEDURE — 66984 XCAPSL CTRC RMVL W/O ECP: CPT | Performed by: OPHTHALMOLOGY

## 2025-01-23 PROCEDURE — A66984 PR REMV CATARACT EXTRACAP,INSERT LENS: Performed by: NURSE ANESTHETIST, CERTIFIED REGISTERED

## 2025-01-23 PROCEDURE — 2500000004 HC RX 250 GENERAL PHARMACY W/ HCPCS (ALT 636 FOR OP/ED): Mod: JZ,TB | Performed by: OPHTHALMOLOGY

## 2025-01-23 PROCEDURE — 2760000001 HC OR 276 NO HCPCS: Performed by: OPHTHALMOLOGY

## 2025-01-23 PROCEDURE — 7100000010 HC PHASE TWO TIME - EACH INCREMENTAL 1 MINUTE: Performed by: OPHTHALMOLOGY

## 2025-01-23 PROCEDURE — 3700000002 HC GENERAL ANESTHESIA TIME - EACH INCREMENTAL 1 MINUTE: Performed by: OPHTHALMOLOGY

## 2025-01-23 PROCEDURE — 3600000003 HC OR TIME - INITIAL BASE CHARGE - PROCEDURE LEVEL THREE: Performed by: OPHTHALMOLOGY

## 2025-01-23 PROCEDURE — 3600000008 HC OR TIME - EACH INCREMENTAL 1 MINUTE - PROCEDURE LEVEL THREE: Performed by: OPHTHALMOLOGY

## 2025-01-23 PROCEDURE — 7100000009 HC PHASE TWO TIME - INITIAL BASE CHARGE: Performed by: OPHTHALMOLOGY

## 2025-01-23 RX ORDER — MIDAZOLAM HYDROCHLORIDE 1 MG/ML
INJECTION, SOLUTION INTRAMUSCULAR; INTRAVENOUS AS NEEDED
Status: DISCONTINUED | OUTPATIENT
Start: 2025-01-23 | End: 2025-01-23

## 2025-01-23 RX ORDER — LIDOCAINE HYDROCHLORIDE 10 MG/ML
0.1 INJECTION, SOLUTION EPIDURAL; INFILTRATION; INTRACAUDAL; PERINEURAL ONCE
Status: DISCONTINUED | OUTPATIENT
Start: 2025-01-23 | End: 2025-01-23 | Stop reason: HOSPADM

## 2025-01-23 RX ORDER — CYCLOPENTOLATE HYDROCHLORIDE 10 MG/ML
1 SOLUTION/ DROPS OPHTHALMIC
Status: COMPLETED | OUTPATIENT
Start: 2025-01-23 | End: 2025-01-23

## 2025-01-23 RX ORDER — ACETAMINOPHEN 325 MG/1
650 TABLET ORAL EVERY 4 HOURS PRN
Status: DISCONTINUED | OUTPATIENT
Start: 2025-01-23 | End: 2025-01-23 | Stop reason: HOSPADM

## 2025-01-23 RX ORDER — FENTANYL CITRATE 50 UG/ML
25 INJECTION, SOLUTION INTRAMUSCULAR; INTRAVENOUS EVERY 5 MIN PRN
Status: DISCONTINUED | OUTPATIENT
Start: 2025-01-23 | End: 2025-01-23 | Stop reason: HOSPADM

## 2025-01-23 RX ORDER — OXYCODONE HYDROCHLORIDE 5 MG/1
5 TABLET ORAL EVERY 4 HOURS PRN
Status: DISCONTINUED | OUTPATIENT
Start: 2025-01-23 | End: 2025-01-23 | Stop reason: HOSPADM

## 2025-01-23 RX ORDER — FENTANYL CITRATE 50 UG/ML
50 INJECTION, SOLUTION INTRAMUSCULAR; INTRAVENOUS EVERY 5 MIN PRN
Status: DISCONTINUED | OUTPATIENT
Start: 2025-01-23 | End: 2025-01-23 | Stop reason: HOSPADM

## 2025-01-23 RX ORDER — ONDANSETRON HYDROCHLORIDE 2 MG/ML
4 INJECTION, SOLUTION INTRAVENOUS ONCE AS NEEDED
Status: DISCONTINUED | OUTPATIENT
Start: 2025-01-23 | End: 2025-01-23 | Stop reason: HOSPADM

## 2025-01-23 RX ORDER — LABETALOL HYDROCHLORIDE 5 MG/ML
5 INJECTION, SOLUTION INTRAVENOUS ONCE AS NEEDED
Status: DISCONTINUED | OUTPATIENT
Start: 2025-01-23 | End: 2025-01-23 | Stop reason: HOSPADM

## 2025-01-23 RX ORDER — TETRACAINE HYDROCHLORIDE 5 MG/ML
1 SOLUTION OPHTHALMIC ONCE
Status: COMPLETED | OUTPATIENT
Start: 2025-01-23 | End: 2025-01-23

## 2025-01-23 RX ORDER — POVIDONE-IODINE 5 %
SOLUTION, NON-ORAL OPHTHALMIC (EYE) AS NEEDED
Status: DISCONTINUED | OUTPATIENT
Start: 2025-01-23 | End: 2025-01-23 | Stop reason: HOSPADM

## 2025-01-23 RX ORDER — ALBUTEROL SULFATE 0.83 MG/ML
2.5 SOLUTION RESPIRATORY (INHALATION) ONCE AS NEEDED
Status: DISCONTINUED | OUTPATIENT
Start: 2025-01-23 | End: 2025-01-23 | Stop reason: HOSPADM

## 2025-01-23 RX ORDER — FENTANYL CITRATE 50 UG/ML
INJECTION, SOLUTION INTRAMUSCULAR; INTRAVENOUS AS NEEDED
Status: DISCONTINUED | OUTPATIENT
Start: 2025-01-23 | End: 2025-01-23

## 2025-01-23 RX ORDER — TETRACAINE HYDROCHLORIDE 5 MG/ML
SOLUTION OPHTHALMIC AS NEEDED
Status: DISCONTINUED | OUTPATIENT
Start: 2025-01-23 | End: 2025-01-23 | Stop reason: HOSPADM

## 2025-01-23 RX ORDER — METOCLOPRAMIDE HYDROCHLORIDE 5 MG/ML
10 INJECTION INTRAMUSCULAR; INTRAVENOUS ONCE AS NEEDED
Status: DISCONTINUED | OUTPATIENT
Start: 2025-01-23 | End: 2025-01-23 | Stop reason: HOSPADM

## 2025-01-23 RX ORDER — PHENYLEPHRINE HYDROCHLORIDE 100 MG/ML
1 SOLUTION/ DROPS OPHTHALMIC
Status: COMPLETED | OUTPATIENT
Start: 2025-01-23 | End: 2025-01-23

## 2025-01-23 RX ORDER — WATER 1 ML/ML
IRRIGANT IRRIGATION AS NEEDED
Status: DISCONTINUED | OUTPATIENT
Start: 2025-01-23 | End: 2025-01-23 | Stop reason: HOSPADM

## 2025-01-23 RX ADMIN — CYCLOPENTOLATE HYDROCHLORIDE 1 DROP: 10 SOLUTION/ DROPS OPHTHALMIC at 07:40

## 2025-01-23 RX ADMIN — CYCLOPENTOLATE HYDROCHLORIDE 1 DROP: 10 SOLUTION/ DROPS OPHTHALMIC at 07:45

## 2025-01-23 RX ADMIN — FENTANYL CITRATE 50 MCG: 0.05 INJECTION, SOLUTION INTRAMUSCULAR; INTRAVENOUS at 08:38

## 2025-01-23 RX ADMIN — CYCLOPENTOLATE HYDROCHLORIDE 1 DROP: 10 SOLUTION/ DROPS OPHTHALMIC at 07:50

## 2025-01-23 RX ADMIN — FENTANYL CITRATE 50 MCG: 0.05 INJECTION, SOLUTION INTRAMUSCULAR; INTRAVENOUS at 08:34

## 2025-01-23 RX ADMIN — PHENYLEPHRINE HYDROCHLORIDE 1 DROP: 100 SOLUTION/ DROPS OPHTHALMIC at 07:50

## 2025-01-23 RX ADMIN — FENTANYL CITRATE 25 MCG: 0.05 INJECTION, SOLUTION INTRAMUSCULAR; INTRAVENOUS at 09:15

## 2025-01-23 RX ADMIN — FENTANYL CITRATE 25 MCG: 0.05 INJECTION, SOLUTION INTRAMUSCULAR; INTRAVENOUS at 08:59

## 2025-01-23 RX ADMIN — MIDAZOLAM 1 MG: 1 INJECTION INTRAMUSCULAR; INTRAVENOUS at 08:32

## 2025-01-23 RX ADMIN — PHENYLEPHRINE HYDROCHLORIDE 1 DROP: 100 SOLUTION/ DROPS OPHTHALMIC at 07:45

## 2025-01-23 RX ADMIN — MIDAZOLAM 1 MG: 1 INJECTION INTRAMUSCULAR; INTRAVENOUS at 08:28

## 2025-01-23 RX ADMIN — TETRACAINE HYDROCHLORIDE 1 DROP: 5 SOLUTION OPHTHALMIC at 07:40

## 2025-01-23 RX ADMIN — SODIUM CHLORIDE: 9 INJECTION, SOLUTION INTRAVENOUS at 08:28

## 2025-01-23 RX ADMIN — PHENYLEPHRINE HYDROCHLORIDE 1 DROP: 100 SOLUTION/ DROPS OPHTHALMIC at 07:40

## 2025-01-23 SDOH — HEALTH STABILITY: MENTAL HEALTH: CURRENT SMOKER: 0

## 2025-01-23 ASSESSMENT — PAIN SCALES - GENERAL
PAINLEVEL_OUTOF10: 0 - NO PAIN
PAINLEVEL_OUTOF10: 0 - NO PAIN
PAIN_LEVEL: 0
PAINLEVEL_OUTOF10: 0 - NO PAIN
PAINLEVEL_OUTOF10: 0 - NO PAIN

## 2025-01-23 ASSESSMENT — PAIN - FUNCTIONAL ASSESSMENT
PAIN_FUNCTIONAL_ASSESSMENT: 0-10

## 2025-01-23 ASSESSMENT — COLUMBIA-SUICIDE SEVERITY RATING SCALE - C-SSRS
6. HAVE YOU EVER DONE ANYTHING, STARTED TO DO ANYTHING, OR PREPARED TO DO ANYTHING TO END YOUR LIFE?: NO
2. HAVE YOU ACTUALLY HAD ANY THOUGHTS OF KILLING YOURSELF?: NO
1. IN THE PAST MONTH, HAVE YOU WISHED YOU WERE DEAD OR WISHED YOU COULD GO TO SLEEP AND NOT WAKE UP?: NO

## 2025-01-23 ASSESSMENT — EXTERNAL EXAM - RIGHT EYE: OD_EXAM: NORMAL

## 2025-01-23 ASSESSMENT — SLIT LAMP EXAM - LIDS
COMMENTS: GOOD POSITION, DERMATOCHALASIS
COMMENTS: GOOD POSITION, DERMATOCHALASIS

## 2025-01-23 ASSESSMENT — EXTERNAL EXAM - LEFT EYE: OS_EXAM: NORMAL

## 2025-01-23 NOTE — PATIENT INSTRUCTIONS
Surgeon: Roxi Parry MD      Patient name: Elina Garcia    Date of visit: 1/24/25      right eye    Prednisolone acetate (pink or white cap) - 4 times a day    Ofloxacin (tan cap) - 4 times a day    Ketorolac (gray cap) - 4 times a day      No heavy lifting over 10-15 lbs, no bending over at the waist for 1 week, do not get eye wet, no eye rubbing. You may take a shower and wash your face - please keep the operative eye closed to minimize water exposure. Wear eye shield at bedtime and sunglasses/glasses during the day. Please call immediately if you develop any redness, pain, decreased vision, flashes, floaters.       Surgical Scheduler (during office hours): [place]: [phone]  Office phone (after hours): [phone]  [facility] : [phone]                     Pager: [phone] for Dr. Medina

## 2025-01-23 NOTE — PROGRESS NOTES
Pseudophakia of right eyeZ96.1 - 1/23/25  -Doing well. Good vision. IOP good.  Prednisolone acetate 1% - 4 times a day  Ofloxacin - 4 times a day  Ketorolac - 4 times a day  No heavy lifting over 10-15 lbs, no bending over, do not get eye wet, no eye rubbing. Wear eye shield at bedtime and sunglasses/glasses during the day. Advised to call if any redness, pain, decreased vision, flashes, floaters. F/u 1 week - refract OU (autorefract first), glare/BAT left eye. Plan for dilation OU if cataract left eye is visually significant and if patient would like to proceed with cataract surgery.     Combined form of age-related cataract, left eyeH25.812  -Will discuss option of cataract surgery left eye at next visit. F/u 1 week - refract OU (autorefract first), glare/BAT left eye. Plan for dilation OU if cataract left eye is visually significant and if patient would like to proceed with cataract surgery.     PDR (proliferative diabetic retinopathy) both eyes with macular edema  Vitreous syneresis of both eyesH43.393  Vitreous hemorrhage, right eye  -HbA1c= 7.4 (9/17/24).   -OCT macula (12/2/24) - SS: 9/10 OS. OD: Unable to capture image. OS: Normal thickness and contour, no significant central edema. 225. Similar to 5/5/23.   -Continue close monitoring of blood glucose, blood pressure, and cholesterol.   -Last seen by Dr. Silver 1/17/25 - s/p PRP OU and anti VEGF. Neovascularization of the disc (NVD) with vitreous hemorrhage OD. OS is s/p PPV EL 2/17/22 - [facility]). Had ALESSANDRO OD 1/17/25.     [id number]  -Continue OTC reading glasses  -F/u 1 week - refract OU (autorefract first), glare/BAT left eye. Plan for dilation OU if cataract left eye is visually significant and if patient would like to proceed with cataract surgery.         No history of refractive surgery.   No FH of AMD/glaucoma

## 2025-01-23 NOTE — H&P
History Of Present Illness  Elina Garcia is a 64 y.o. female presenting for right eye cataract removal and insertion of intraocular lens.     Past Medical History  Past Medical History:   Diagnosis Date    Abdominal tenderness, unspecified site 08/08/2018    Abdominal tenderness    Anesthesia of skin 07/11/2019    Numbness and tingling in both hands    Arthritis     Body mass index (BMI) 35.0-35.9, adult 05/20/2021    BMI 35.0-35.9,adult    Brain concussion Numerous head trauma events due to employment.  Last one was June 2023    Cataract     Diabetes mellitus type I (Multi)     Diabetic retinopathy (Multi)     Epigastric pain 09/13/2018    Chronic epigastric pain    GERD (gastroesophageal reflux disease)     History of falling 06/13/2018    H/O fall    Hyperlipidemia     Hypertension     Neuropathy in diabetes (Multi)     Other specified noninflammatory disorders of vagina 05/31/2019    Vaginal irritation    Other symptoms and signs involving the nervous system 02/12/2021    Suspected sleep apnea    Pain in left shoulder 02/11/2021    Left shoulder pain    Pain in right knee 03/18/2017    Right knee pain    Pain in right thigh 03/18/2017    Pain of right thigh    Personal history of other diseases of the circulatory system 08/22/2019    History of hypertension    Personal history of other diseases of the circulatory system 12/06/2016    History of secondary hypertension    Personal history of other diseases of the musculoskeletal system and connective tissue 06/13/2018    History of low back pain    Personal history of other diseases of the nervous system and sense organs 02/12/2021    History of sleep apnea    Personal history of other diseases of urinary system 12/12/2019    History of hematuria    Personal history of other drug therapy 12/06/2016    History of pneumococcal vaccination    Personal history of other drug therapy 08/30/2017    History of influenza vaccination    Personal history of other drug  therapy 12/06/2016    History of influenza vaccination    Personal history of other endocrine, nutritional and metabolic disease     History of type 2 diabetes mellitus    Personal history of other endocrine, nutritional and metabolic disease     History of hyperlipidemia    Personal history of other endocrine, nutritional and metabolic disease 04/11/2022    History of diabetes mellitus    Personal history of other endocrine, nutritional and metabolic disease 11/30/2021    History of hyperlipidemia    Personal history of other specified conditions 05/13/2021    History of dysuria    Personal history of other specified conditions 06/20/2019    History of abdominal pain    Personal history of other specified conditions 06/20/2019    History of fever    Personal history of other specified conditions 07/02/2019    History of fatigue    Personal history of other specified conditions 08/08/2018    History of chest pain    Personal history of other specified conditions 12/06/2016    History of tachycardia    Personal history of traumatic brain injury     History of concussion    Personal history of urinary (tract) infections 12/12/2019    History of urinary tract infection    Sleep apnea     does not use cpap    Unspecified abdominal pain 04/02/2019    Abdominal cramping    Unspecified multiple injuries, initial encounter 05/23/2018    Contusion, multiple sites    Unspecified urinary incontinence 05/02/2019    Urinary incontinence in female    Urinary tract infection        Surgical History  Past Surgical History:   Procedure Laterality Date    ACHILLES TENDON SURGERY Left     COLONOSCOPY      CYSTOSCOPY      HYSTERECTOMY  12/06/2016    Hysterectomy    MENISCECTOMY Left     knee injury    PANRETINAL PHOTOCOAGULATION          Social History  She reports that she has never smoked. She has never used smokeless tobacco. She reports that she does not currently use alcohol. She reports that she does not use drugs.    Family  History  Family History   Problem Relation Name Age of Onset    Other (cardiac disorder) Mother Stacy     Diabetes Mother Stacy     Hypertension Mother Stacy     Alzheimer's disease Mother Stacy     Dementia Mother Stacy     Urinary tract infection Mother Stacy     ALS Father Emeterio     Hypertension Father Emeterio     Diabetes Brother      Macular degeneration Neg Hx      Glaucoma Neg Hx          Allergies  Metformin    Review of Systems   Constitutional: Negative.    HENT: Negative.     Eyes: Negative.    Respiratory: Negative.     Cardiovascular: Negative.    Gastrointestinal: Negative.    Endocrine: Negative.  .    Neurological: Negative.    Psychiatric/Behavioral: Negative.    Physical Exam   General: Alert and Oriented x3  Head and neck: atraumatic and normacephalic  Lungs: The chest wall is symmetric and without deformity. No signs of respiratory distress. Lung sounds are clear in all lobes bilaterally.   Heart: Regular rate and rhythm.   Abdomen: Soft and non-tender    Last Recorded Vitals  Weight 108 kg (238 lb).    Relevant Results             Assessment/Plan   Assessment & Plan  Combined form of age-related cataract, right eye      Elina Garcia is a 64 y.o. female presenting for right eye cataract removal and insertion of intraocular lens.           Shira Richter MD

## 2025-01-23 NOTE — ANESTHESIA POSTPROCEDURE EVALUATION
Patient: Elina Garcia    Procedure Summary       Date: 01/23/25 Room / Location: [facility] [place] [facility] 04 / Virtual [facility] [place] [facility]    Anesthesia Start: 0825 Anesthesia Stop: 0920    Procedure: PHACOEMULSIFICATION, CATARACT, WITH IOL INSERTION (Right: Eye) Diagnosis:       Combined form of age-related cataract, right eye      (Combined form of age-related cataract, right eye [H25.811])    Surgeons: Roxi Parry MD Responsible Provider: VINNY Goel    Anesthesia Type: MAC ASA Status: 3            Anesthesia Type: MAC    Vitals Value Taken Time   /80 01/23/25 0955   Temp 36.6 °C (97.9 °F) 01/23/25 0955   Pulse 93 01/23/25 0955   Resp 16 01/23/25 0955   SpO2 98 % 01/23/25 0955       Anesthesia Post Evaluation    Patient location during evaluation: bedside  Patient participation: complete - patient participated  Level of consciousness: awake  Pain score: 0  Pain management: adequate  Airway patency: patent  Cardiovascular status: acceptable  Respiratory status: acceptable  Hydration status: acceptable  Postoperative Nausea and Vomiting: none    There were no known notable events for this encounter.

## 2025-01-23 NOTE — DISCHARGE INSTRUCTIONS
Surgeon: Roxi Parry MD     Patient name: Elina Garcia    Date of surgery: January 23, 2025        DROP INSTRUCTIONS:    Prednisolone acetate 1% (pink or white cap) - One drop 4 times a day to operative eye    Ofloxacin (tan cap) - One drop 4 times a day to operative eye    Ketorolac (gray cap) - One drop 4 times a day to operative eye    When putting different drops in around the same administration time, please wait 1-2 minutes between drops  Avoid sleeping on side of operative eye  No heavy lifting over 10-15lbs and no bending over  Do not get eye wet, no eye rubbing  Wear sunglasses or glasses during the day and the shield when sleeping at night  Please call immediately if you develop any redness, pain, decreased vision, flashes, or floaters      During office hours (8:30a-4:30p): [phone]  After office hours: [phone]  Hospital : [phone]   Pager: [phone] for Dr. Medina

## 2025-01-23 NOTE — OP NOTE
PHACOEMULSIFICATION, CATARACT, WITH IOL INSERTION (R) Operative Note     Date: 2025  OR Location: [facility] [place] [facility]    Name: Elina Garcia, : 1960, Age: 64 y.o., MRN: [id number], Sex: female    Diagnosis  Pre-op Diagnosis      * Combined form of age-related cataract, right eye [H25.811] Post-op Diagnosis     * Combined form of age-related cataract, right eye [H25.811]     Procedures  PHACOEMULSIFICATION, CATARACT, WITH IOL INSERTION  57872 - NC XCAPSL CTRC RMVL INSJ IO LENS PROSTH CPLX WO ECP      Surgeons      * Roxi Parry - Primary    Resident/Fellow/Other Assistant:  Surgeons and Role:  * No surgeons found with a matching role *    Staff:   Sebulator: Cassandra  Scrub Person: Barbara  Scrub Person: Cassie    Anesthesia Staff: CRNA: VINNY Goel    Procedure Summary  Anesthesia: Monitor Anesthesia Care  ASA: III  Estimated Blood Loss: 0 mL  Intra-op Medications:   Administrations occurring from 0820 to 0910 on 25:   Medication Name Total Dose   balanced salts (BSS) intraocular solution 15 mL   sterile water irrigation solution 100 mL   povidone-iodine 5 % ophthalmic solution 1 Application   tetracaine (PF) 0.5 % ophthalmic solution 2 drop   EPINEPHrine (Adrenalin) 1 mg/mL 0.3 mg in balanced salts (BSS) 500 mL irrigation 0.3 mg   lidocaine PF (Xylocaine) 10 mg/mL (1 %) 1 mL, EPINEPHrine (Adrenalin) 1 mg/mL 1 mL in balanced salts (BSS) 3 mL syringe 1 mL   chondroitin sulf-sod hyaluron (Viscoat) intraocular injection 1 mL   fentaNYL PF 0.05 mg/mL 125 mcg   midazolam (Versed) 1 mg/1 mL 2 mg   NaCl 0.9 % bolus Cannot be calculated              Anesthesia Record               Intraprocedure I/O Totals          Intake    NaCl 0.9 % bolus 10.00 mL    Total Intake 10 mL          Specimen: No specimens collected              Drains and/or Catheters: * None in log *    Tourniquet Times:         Implants:  Implants       Type Name Action Serial No.       20.5 DIOPTER, MIROSLAVA HEALY  1-PIECE IOL W/ SIMPLICITY DELIVERY SYSTEM, BICONVEX, UV-BLOCKING HYDROPHOBIC ACRYLIC, MODEL DIB00 Implanted [id number]{ [id number]              Findings: as below    Indications: Elina Garcia is an 64 y.o. female who is having surgery for Combined form of age-related cataract, right eye [H25.811].         Procedure Details:     Patient name: Elina Garcia   YOB: 1960   MRN: [id number]   Date of surgery: January 23, 2025  Preoperative diagnosis: Combined cataract of the RIGHT eye  Postoperative diagnosis: Combined cataract of the RIGHT eye  Procedure: Phacoemulsification of cataract with insertion of intraocular lens, RIGHT eye   Surgeon: Roxi Parry MD  Resident: Shira Richter MD  Anesthesia: MAC  Complications: None  Lens Inserted: Crow & Crow DIB00 with a power of +20.50 D. Serial #: [id number]. Exp date: 07/09/2027.    Procedure description: After the risks, benefits, and alternatives of the planned procedure were discussed with the patient, informed consent was obtained at the preoperative evaluation. On the day of surgery, there were no updates to the consent form and any patient questions were answered. The patient was correctly identified in the preoperative area and the RIGHT eye was marked as the operative eye. Dilating drops were instilled into the RIGHT eye in the preoperative area. The patient was then taken back to the operating room and placed under sedation. The patient was prepped and draped in the standard, sterile ophthalmic fashion in preparation for intraocular surgery.    A lid speculum was placed into the RIGHT palpebral fissure and the operating microscope was brought into position. A paracentesis was made in superotemporal cornea at the limbus with a 1.0mm side port blade using a cotton tipped applicator to provide countertraction. Intracameral epishugarcaine was injected into the anterior chamber followed by Viscoat viscoelastic. Using 0.12 forceps to  stabilize the globe, a 2.4mm keratome blade was used to create a limbal clear-corneal incision inferotemporally. A bent-needle cystotome and Utrata forceps were used to create a continuous curvilinear capsulorrhexis. Balanced salt saline solution on a blunt-tipped cannula was used to achieve hydrodissection.    A phacoemulsification device and a Zaria spatula were used to remove the nucleus using a divide-and-conquer technique. Residual cortical material was removed with the irrigation and aspiration handpiece. Provisc viscoelastic was then injected into the eye to reform the anterior chamber and to open the capsular bag. The posterior capsule was inspected and found to be clean and intact. The intraocular lens, Crow & Crow DIB00 with a power of +20.50 diopters was injected into the capsular bag. A lens positioner was used to center the lens and ensure good position within the bag. The remaining viscoelastic was removed using irrigation and aspiration. Balanced salt saline solution on a blunt-tipped cannula was then used to hydrate the corneal stroma adjacent to the main wound and paracentesis site as well as to reform the anterior chamber. The wound was checked and found to be watertight with normal intraocular pressure verified using digital palpation. At the conclusion of the case, a well-centered intraocular lens with a good red reflex was observed. Tetracaine, betadine, and BSS were instilled into the RIGHT eye. The lid speculum and drapes were removed. A clear plastic shield was then taped over the eye. The patient was taken to the recovery room in stable condition, having tolerated the procedure well. There were no complications.        Complications:  None; patient tolerated the procedure well.    Disposition: PACU - hemodynamically stable.  Condition: stable                 Additional Details: none    Attending Attestation: I performed the procedure with resident assistance.    Roxi AGUILLON  Brinda  Phone Number: [phone]

## 2025-01-23 NOTE — ANESTHESIA PREPROCEDURE EVALUATION
Patient: Elina Garcia    Procedure Information       Date/Time: 01/23/25 0820    Procedure: PHACOEMULSIFICATION, CATARACT, WITH IOL INSERTION (Right: Eye)    Location: [facility] [place] [facility] OR 04 / Virtual [facility] [place] [facility]    Surgeons: Roxi Parry MD            Relevant Problems   Anesthesia   (+) PONV (postoperative nausea and vomiting)      Cardiac   (+) Chest pain (Resolved)   (+) Essential hypertension   (+) HLD (hyperlipidemia)      Pulmonary   (+) Asthma   (+) RENAN (obstructive sleep apnea)   (+) Pneumonia (Resolved)      Neuro   (+) Cervical radiculopathy   (+) Left carpal tunnel syndrome   (+) Right carpal tunnel syndrome      GI   (+) GERD (gastroesophageal reflux disease)   (+) Irritable bowel syndrome with diarrhea      /Renal   (+) Recurrent UTI (Resolved)      Endocrine   (+) Class 2 severe obesity due to excess calories with serious comorbidity and body mass index (BMI) of 35.0 to 35.9 in adult   (+) PDR (proliferative diabetic retinopathy) (Multi)   (+) Severe nonproliferative diabetic retinopathy of left eye without macular edema associated with type 2 diabetes mellitus   (+) Type 2 diabetes mellitus with microalbuminuria (Multi)   (+) Type 2 diabetes mellitus with proliferative diabetic retinopathy with macular edema, bilateral      Musculoskeletal   (+) Left carpal tunnel syndrome   (+) Right carpal tunnel syndrome      ID   (+) COVID-19 virus infection (Resolved)   (+) Infection due to fungus (Resolved)   (+) Pneumonia (Resolved)   (+) Recurrent UTI (Resolved)       Clinical information reviewed:   Tobacco  Allergies  Meds   Med Hx  Surg Hx  OB Status  Fam Hx  Soc   Hx        NPO Detail:  NPO/Void Status  Date of Last Liquid: 01/22/25  Time of Last Liquid: 0630  Date of Last Solid: 01/22/25  Time of Last Solid: 2100  Last Intake Type: Clear fluids; Food         Physical Exam    Airway  Mallampati: I  TM distance: >3 FB     Cardiovascular   Rhythm: regular     Dental - normal exam      Pulmonary - normal exam     Abdominal          Anesthesia Plan    History of general anesthesia?: yes  History of complications of general anesthesia?: no    ASA 3     MAC     The patient is not a current smoker.    intravenous induction   Anesthetic plan and risks discussed with patient.  Use of blood products discussed with patient who consented to blood products.    Plan discussed with attending.

## 2025-01-24 ENCOUNTER — APPOINTMENT (OUTPATIENT)
Dept: OPHTHALMOLOGY | Facility: CLINIC | Age: 65
End: 2025-01-24
Payer: COMMERCIAL

## 2025-01-24 DIAGNOSIS — H43.11 VITREOUS HEMORRHAGE, RIGHT EYE (MULTI): ICD-10-CM

## 2025-01-24 DIAGNOSIS — Z96.1 PSEUDOPHAKIA: Primary | ICD-10-CM

## 2025-01-24 DIAGNOSIS — H52.4 PRESBYOPIA: ICD-10-CM

## 2025-01-24 DIAGNOSIS — E11.3513 PROLIFERATIVE DIABETIC RETINOPATHY OF BOTH EYES WITH MACULAR EDEMA ASSOCIATED WITH TYPE 2 DIABETES MELLITUS: ICD-10-CM

## 2025-01-24 DIAGNOSIS — H25.812 COMBINED FORM OF AGE-RELATED CATARACT, LEFT EYE: ICD-10-CM

## 2025-01-24 PROCEDURE — 99024 POSTOP FOLLOW-UP VISIT: CPT | Performed by: OPHTHALMOLOGY

## 2025-01-24 PROCEDURE — 1036F TOBACCO NON-USER: CPT | Performed by: OPHTHALMOLOGY

## 2025-01-24 ASSESSMENT — TONOMETRY
IOP_METHOD: GOLDMANN APPLANATION
OD_IOP_MMHG: 16

## 2025-01-24 ASSESSMENT — ENCOUNTER SYMPTOMS
EYES NEGATIVE: 1
CONSTITUTIONAL NEGATIVE: 0
ALLERGIC/IMMUNOLOGIC NEGATIVE: 0
CARDIOVASCULAR NEGATIVE: 0
PSYCHIATRIC NEGATIVE: 0
ENDOCRINE NEGATIVE: 0
HEMATOLOGIC/LYMPHATIC NEGATIVE: 0
MUSCULOSKELETAL NEGATIVE: 0
NEUROLOGICAL NEGATIVE: 0
RESPIRATORY NEGATIVE: 0
GASTROINTESTINAL NEGATIVE: 0

## 2025-01-24 ASSESSMENT — VISUAL ACUITY
METHOD: SNELLEN - LINEAR
OD_SC: 20/30

## 2025-01-29 PROCEDURE — RXMED WILLOW AMBULATORY MEDICATION CHARGE

## 2025-01-31 ENCOUNTER — APPOINTMENT (OUTPATIENT)
Dept: OPHTHALMOLOGY | Facility: CLINIC | Age: 65
End: 2025-01-31
Payer: COMMERCIAL

## 2025-01-31 ENCOUNTER — PHARMACY VISIT (OUTPATIENT)
Dept: PHARMACY | Facility: CLINIC | Age: 65
End: 2025-01-31
Payer: COMMERCIAL

## 2025-01-31 DIAGNOSIS — G89.29 OTHER CHRONIC PAIN: ICD-10-CM

## 2025-01-31 RX ORDER — GABAPENTIN 300 MG/1
300 CAPSULE ORAL 2 TIMES DAILY
Qty: 180 CAPSULE | Refills: 0 | OUTPATIENT
Start: 2025-01-31

## 2025-02-06 ENCOUNTER — APPOINTMENT (OUTPATIENT)
Dept: PHARMACY | Facility: HOSPITAL | Age: 65
End: 2025-02-06
Payer: COMMERCIAL

## 2025-02-11 ENCOUNTER — APPOINTMENT (OUTPATIENT)
Dept: PHYSICAL THERAPY | Facility: CLINIC | Age: 65
End: 2025-02-11
Payer: COMMERCIAL

## 2025-02-17 ENCOUNTER — HOSPITAL ENCOUNTER (EMERGENCY)
Facility: HOSPITAL | Age: 65
Discharge: HOME | End: 2025-02-17
Payer: COMMERCIAL

## 2025-02-17 ENCOUNTER — APPOINTMENT (OUTPATIENT)
Dept: RADIOLOGY | Facility: HOSPITAL | Age: 65
End: 2025-02-17
Payer: COMMERCIAL

## 2025-02-17 VITALS
HEART RATE: 102 BPM | RESPIRATION RATE: 18 BRPM | SYSTOLIC BLOOD PRESSURE: 164 MMHG | BODY MASS INDEX: 34.86 KG/M2 | WEIGHT: 230 LBS | TEMPERATURE: 97.1 F | HEIGHT: 68 IN | DIASTOLIC BLOOD PRESSURE: 91 MMHG | OXYGEN SATURATION: 97 %

## 2025-02-17 DIAGNOSIS — S09.90XA HEAD INJURY, INITIAL ENCOUNTER: Primary | ICD-10-CM

## 2025-02-17 DIAGNOSIS — S70.01XA CONTUSION OF RIGHT HIP, INITIAL ENCOUNTER: ICD-10-CM

## 2025-02-17 PROCEDURE — 73110 X-RAY EXAM OF WRIST: CPT | Mod: RT

## 2025-02-17 PROCEDURE — 73502 X-RAY EXAM HIP UNI 2-3 VIEWS: CPT | Mod: RT

## 2025-02-17 PROCEDURE — 73552 X-RAY EXAM OF FEMUR 2/>: CPT | Mod: RT

## 2025-02-17 PROCEDURE — 73564 X-RAY EXAM KNEE 4 OR MORE: CPT | Mod: RIGHT SIDE | Performed by: RADIOLOGY

## 2025-02-17 PROCEDURE — 73552 X-RAY EXAM OF FEMUR 2/>: CPT | Mod: RIGHT SIDE | Performed by: RADIOLOGY

## 2025-02-17 PROCEDURE — 73110 X-RAY EXAM OF WRIST: CPT | Mod: RIGHT SIDE | Performed by: RADIOLOGY

## 2025-02-17 PROCEDURE — 2500000001 HC RX 250 WO HCPCS SELF ADMINISTERED DRUGS (ALT 637 FOR MEDICARE OP): Performed by: NURSE PRACTITIONER

## 2025-02-17 PROCEDURE — 73030 X-RAY EXAM OF SHOULDER: CPT | Mod: RIGHT SIDE | Performed by: RADIOLOGY

## 2025-02-17 PROCEDURE — 73502 X-RAY EXAM HIP UNI 2-3 VIEWS: CPT | Mod: RIGHT SIDE | Performed by: RADIOLOGY

## 2025-02-17 PROCEDURE — 99284 EMERGENCY DEPT VISIT MOD MDM: CPT | Mod: 25

## 2025-02-17 PROCEDURE — 73030 X-RAY EXAM OF SHOULDER: CPT | Mod: RT

## 2025-02-17 PROCEDURE — 72125 CT NECK SPINE W/O DYE: CPT

## 2025-02-17 PROCEDURE — 70450 CT HEAD/BRAIN W/O DYE: CPT

## 2025-02-17 PROCEDURE — 73564 X-RAY EXAM KNEE 4 OR MORE: CPT | Mod: RT

## 2025-02-17 RX ORDER — NAPROXEN 500 MG/1
500 TABLET ORAL
Qty: 20 TABLET | Refills: 0 | Status: SHIPPED | OUTPATIENT
Start: 2025-02-17 | End: 2025-02-27

## 2025-02-17 RX ORDER — OXYCODONE AND ACETAMINOPHEN 5; 325 MG/1; MG/1
1 TABLET ORAL ONCE
Status: COMPLETED | OUTPATIENT
Start: 2025-02-17 | End: 2025-02-17

## 2025-02-17 RX ORDER — TIZANIDINE 2 MG/1
4 TABLET ORAL 2 TIMES DAILY
Qty: 28 TABLET | Refills: 0 | Status: SHIPPED | OUTPATIENT
Start: 2025-02-17 | End: 2025-02-24

## 2025-02-17 RX ORDER — OXYCODONE AND ACETAMINOPHEN 5; 325 MG/1; MG/1
1 TABLET ORAL ONCE
Status: DISCONTINUED | OUTPATIENT
Start: 2025-02-17 | End: 2025-02-17 | Stop reason: HOSPADM

## 2025-02-17 RX ADMIN — OXYCODONE HYDROCHLORIDE AND ACETAMINOPHEN 1 TABLET: 5; 325 TABLET ORAL at 17:58

## 2025-02-17 ASSESSMENT — COLUMBIA-SUICIDE SEVERITY RATING SCALE - C-SSRS
2. HAVE YOU ACTUALLY HAD ANY THOUGHTS OF KILLING YOURSELF?: NO
1. IN THE PAST MONTH, HAVE YOU WISHED YOU WERE DEAD OR WISHED YOU COULD GO TO SLEEP AND NOT WAKE UP?: NO
6. HAVE YOU EVER DONE ANYTHING, STARTED TO DO ANYTHING, OR PREPARED TO DO ANYTHING TO END YOUR LIFE?: NO

## 2025-02-17 ASSESSMENT — PAIN - FUNCTIONAL ASSESSMENT: PAIN_FUNCTIONAL_ASSESSMENT: 0-10

## 2025-02-17 ASSESSMENT — PAIN DESCRIPTION - PAIN TYPE: TYPE: ACUTE PAIN

## 2025-02-17 ASSESSMENT — PAIN DESCRIPTION - ORIENTATION
ORIENTATION: RIGHT
ORIENTATION: RIGHT

## 2025-02-17 ASSESSMENT — PAIN SCALES - GENERAL
PAINLEVEL_OUTOF10: 8
PAINLEVEL_OUTOF10: 8

## 2025-02-17 ASSESSMENT — PAIN DESCRIPTION - ONSET: ONSET: SUDDEN

## 2025-02-17 ASSESSMENT — PAIN DESCRIPTION - DESCRIPTORS: DESCRIPTORS: ACHING

## 2025-02-17 ASSESSMENT — PAIN DESCRIPTION - PROGRESSION: CLINICAL_PROGRESSION: NOT CHANGED

## 2025-02-17 ASSESSMENT — PAIN DESCRIPTION - LOCATION
LOCATION: HIP
LOCATION: LEG

## 2025-02-17 ASSESSMENT — PAIN DESCRIPTION - FREQUENCY: FREQUENCY: CONSTANT/CONTINUOUS

## 2025-02-17 NOTE — ED PROVIDER NOTES
HPI   Chief Complaint   Patient presents with    Fall       64-year-old female presents today with fall down 3 steps on Saturday.  She has a recent history of cataract surgery in January 23.  She states that this was mechanical and she lost her footing.  She struck her head on the side of the wall and she endorses headache and posterior neck pain.  She endorses a feeling of nausea.  She endorses right shoulder and right wrist pain.  She can supinate but that increases her pain.  She can only raise her shoulder 45 degrees.  She endorses right hip and proximal femur pain that is lateral.  She endorses knee pain and right lateral femoral pain.  She has taken acetaminophen for the pain.  She denies confusion.  She denies chest pain.  She denies dyspnea.  She denies abdominal pain.  She denies melena, hematochezia, or hematuria.  She is not on any anticoagulant medication.      History provided by:  Patient   used: No            Patient History   Past Medical History:   Diagnosis Date    Abdominal tenderness, unspecified site 08/08/2018    Abdominal tenderness    Anesthesia of skin 07/11/2019    Numbness and tingling in both hands    Arthritis     Body mass index (BMI) 35.0-35.9, adult 05/20/2021    BMI 35.0-35.9,adult    Brain concussion Numerous head trauma events due to employment.  Last one was June 2023    Cataract     Diabetes mellitus type I (Multi)     Diabetic retinopathy (Multi)     Epigastric pain 09/13/2018    Chronic epigastric pain    GERD (gastroesophageal reflux disease)     History of falling 06/13/2018    H/O fall    Hyperlipidemia     Hypertension     Neuropathy in diabetes (Multi)     Other specified noninflammatory disorders of vagina 05/31/2019    Vaginal irritation    Other symptoms and signs involving the nervous system 02/12/2021    Suspected sleep apnea    Pain in left shoulder 02/11/2021    Left shoulder pain    Pain in right knee 03/18/2017    Right knee pain    Pain in  right thigh 03/18/2017    Pain of right thigh    Personal history of other diseases of the circulatory system 08/22/2019    History of hypertension    Personal history of other diseases of the circulatory system 12/06/2016    History of secondary hypertension    Personal history of other diseases of the musculoskeletal system and connective tissue 06/13/2018    History of low back pain    Personal history of other diseases of the nervous system and sense organs 02/12/2021    History of sleep apnea    Personal history of other diseases of urinary system 12/12/2019    History of hematuria    Personal history of other drug therapy 12/06/2016    History of pneumococcal vaccination    Personal history of other drug therapy 08/30/2017    History of influenza vaccination    Personal history of other drug therapy 12/06/2016    History of influenza vaccination    Personal history of other endocrine, nutritional and metabolic disease     History of type 2 diabetes mellitus    Personal history of other endocrine, nutritional and metabolic disease     History of hyperlipidemia    Personal history of other endocrine, nutritional and metabolic disease 04/11/2022    History of diabetes mellitus    Personal history of other endocrine, nutritional and metabolic disease 11/30/2021    History of hyperlipidemia    Personal history of other specified conditions 05/13/2021    History of dysuria    Personal history of other specified conditions 06/20/2019    History of abdominal pain    Personal history of other specified conditions 06/20/2019    History of fever    Personal history of other specified conditions 07/02/2019    History of fatigue    Personal history of other specified conditions 08/08/2018    History of chest pain    Personal history of other specified conditions 12/06/2016    History of tachycardia    Personal history of traumatic brain injury     History of concussion    Personal history of urinary (tract) infections  12/12/2019    History of urinary tract infection    Sleep apnea     does not use cpap    Unspecified abdominal pain 04/02/2019    Abdominal cramping    Unspecified multiple injuries, initial encounter 05/23/2018    Contusion, multiple sites    Unspecified urinary incontinence 05/02/2019    Urinary incontinence in female    Urinary tract infection      Past Surgical History:   Procedure Laterality Date    ACHILLES TENDON SURGERY Left     CATARACT EXTRACTION W/  INTRAOCULAR LENS IMPLANT Right 01/23/2025    Dr. Medina    COLONOSCOPY      CYSTOSCOPY      HYSTERECTOMY  12/06/2016    Hysterectomy    MENISCECTOMY Left     knee injury    PANRETINAL PHOTOCOAGULATION       Family History   Problem Relation Name Age of Onset    Other (cardiac disorder) Mother Stacy     Diabetes Mother Stacy     Hypertension Mother Stacy     Alzheimer's disease Mother Stacy     Dementia Mother Stacy     Urinary tract infection Mother Stacy     ALS Father [place]     Hypertension Father [place]     Diabetes Brother      Macular degeneration Neg Hx      Glaucoma Neg Hx       Social History     Tobacco Use    Smoking status: Never    Smokeless tobacco: Never    Tobacco comments:     Denies SOB with stairs or lying flat. Ambulates freely. No illness last 30 days. Denies personal or family reaction to anesthesia.    Vaping Use    Vaping status: Never Used   Substance Use Topics    Alcohol use: Not Currently    Drug use: Never       Physical Exam   ED Triage Vitals [02/17/25 1452]   Temperature Heart Rate Respirations BP   36.2 °C (97.1 °F) (!) 102 18 (!) 164/91      Pulse Ox Temp Source Heart Rate Source Patient Position   97 % Temporal Monitor Sitting      BP Location FiO2 (%)     Left arm --       Physical Exam  Constitutional:       Appearance: Normal appearance.   HENT:      Head: Normocephalic and atraumatic.      Comments: Slight hematoma to the lateral occipital aspect on the right     Right Ear: Tympanic membrane normal.       Left Ear: Tympanic membrane normal.      Nose: Nose normal.      Mouth/Throat:      Mouth: Mucous membranes are moist.   Eyes:      Extraocular Movements: Extraocular movements intact.      Pupils: Pupils are equal, round, and reactive to light.   Cardiovascular:      Rate and Rhythm: Normal rate and regular rhythm.      Pulses: Normal pulses.      Heart sounds: Normal heart sounds.   Pulmonary:      Effort: Pulmonary effort is normal.      Breath sounds: Normal breath sounds.   Abdominal:      General: Abdomen is flat.      Palpations: Abdomen is soft.   Musculoskeletal:         General: Normal range of motion.      Cervical back: Normal range of motion.   Skin:     General: Skin is warm.      Capillary Refill: Capillary refill takes less than 2 seconds.   Neurological:      General: No focal deficit present.      Mental Status: She is alert and oriented to person, place, and time.      Cranial Nerves: No cranial nerve deficit.      Sensory: No sensory deficit.      Motor: No weakness.      Coordination: Coordination normal.      Gait: Gait normal.      Deep Tendon Reflexes: Reflexes normal.   Psychiatric:         Mood and Affect: Mood normal.         Behavior: Behavior normal.           ED Course & MDM   Diagnoses as of 02/17/25 1838   Head injury, initial encounter   Contusion of right hip, initial encounter                 No data recorded     Edmundo Coma Scale Score: 15 (02/17/25 1454 : Elaine Good RN)                           Medical Decision Making  NIH was 0 stroke van was negative.  Patient was actually ambulating under her own strength and she was able to walk to x-ray on her own.  She did have pain during supination and pronation and she had limited range of motion of her shoulder.  X-ray was ordered of shoulder, wrist, right hip femur and knee.  CT of head and neck ordered.  She received 1 Percocet for pain.  Patient responded well to Percocet.  She went home on tizanidine and Naprosyn.  The vital  signs were rechecked and stable.  Her CT of her head showed no acute intercranial process.  CT of the cervical spine showed no evidence of acute fracture or subluxation.  The x-ray of her shoulder showed no acute fracture or dislocation.  X-ray of the wrist showed no acute fracture or dislocation.  X-ray of the hip showed no acute fracture or dislocation but early degenerative changes.  I recommended patient follow-up with our walk-in orthopedic clinic as needed.  There was no acute fracture or dislocation but degeneration of the femur.  X-ray of the knee showed no acute fracture or dislocation and also mildly degenerative changes.  The fall could have aggravated her symptoms.  Return precautions reviewed and safely discharged home.    Amount and/or Complexity of Data Reviewed  Radiology: ordered and independent interpretation performed.        Procedure  Procedures     Toby Crain, BEBETO-CNP  02/17/25 3259

## 2025-02-17 NOTE — ED TRIAGE NOTES
Patient presents to ED from home for a fall down the stairs. Patient states she had a mis step and fell down 3 steps. She hit her head against a wall and landed on her right hip. Patient admits to a slight headache after the fall and is now unable to move her right leg. Patient denies LOC or blood thinners.

## 2025-02-18 ENCOUNTER — APPOINTMENT (OUTPATIENT)
Dept: PHYSICAL THERAPY | Facility: CLINIC | Age: 65
End: 2025-02-18
Payer: COMMERCIAL

## 2025-02-18 ENCOUNTER — APPOINTMENT (OUTPATIENT)
Dept: PHARMACY | Facility: HOSPITAL | Age: 65
End: 2025-02-18
Payer: COMMERCIAL

## 2025-02-18 DIAGNOSIS — K21.9 GASTROESOPHAGEAL REFLUX DISEASE WITHOUT ESOPHAGITIS: ICD-10-CM

## 2025-02-18 DIAGNOSIS — E11.3513 TYPE 2 DIABETES MELLITUS WITH BOTH EYES AFFECTED BY PROLIFERATIVE RETINOPATHY AND MACULAR EDEMA, WITHOUT LONG-TERM CURRENT USE OF INSULIN: ICD-10-CM

## 2025-02-18 PROCEDURE — RXMED WILLOW AMBULATORY MEDICATION CHARGE

## 2025-02-18 RX ORDER — PANTOPRAZOLE SODIUM 40 MG/1
40 TABLET, DELAYED RELEASE ORAL DAILY
Qty: 90 TABLET | Refills: 0 | Status: SHIPPED | OUTPATIENT
Start: 2025-02-18

## 2025-02-18 RX ORDER — TIRZEPATIDE 12.5 MG/.5ML
12.5 INJECTION, SOLUTION SUBCUTANEOUS
Qty: 6 ML | Refills: 0 | Status: SHIPPED | OUTPATIENT
Start: 2025-02-18

## 2025-02-18 ASSESSMENT — ENCOUNTER SYMPTOMS
DIZZINESS: 1
SWEATS: 1
HEADACHES: 1

## 2025-02-18 NOTE — ASSESSMENT & PLAN NOTE
Patient's goal A1c is < 7%.  Is pt at goal? No, 7.4%  Patient's SMBGs are: 30-day TIR 75%.     Rationale for plan: Patient reports desire for GLP-1 increase for more weight loss     Medication Changes:  CONTINUE  Jardiance 10 mg - 1 tablet daily   Novolog Flex Pen - written to take per SSI up to 40 units daily  Average Units Injected: 12 AC   No SSI per endo  Semglee 100 u/mL Pen- 36 units twice daily   Pt taking Morning: <150 mg/dL 32-35 units , Evening 28 units if BG <130 mg/dL    INCREASE   Mounjaro 12.5 mg/0.5 mL - once weekly   Patient understands insulin may need to be decreased- plans to discuss with Endo at next visit    Future Considerations:  May consider further Jardiance/Mounjaro titration with plan to decrease insulin simultaneously

## 2025-02-18 NOTE — PROGRESS NOTES
Clinical Pharmacy Appointment  Subjective     Patient ID: Elina Garcia is a 64 y.o. female who presents for Diabetes.    Referring Provider: Bibi Bridges DO     Diabetes  She presents for her follow-up diabetic visit. She has type 2 diabetes mellitus. Her disease course has been improving. Hypoglycemia symptoms include dizziness, headaches and sweats. She is compliant with treatment most of the time.     Diet: Less of an appetite- thinks everything is about the same     Exercise: Currently limited       Allergies   Allergen Reactions    Metformin Diarrhea       Objective     Current DM Pharmacotherapy:   Jardiance 10 mg - 1 tablet daily   Novolog Flex Pen - written to take per SSI up to 40 units daily  Average Units Injected: 12 AC   No SSI per endo  Semglee 100 u/mL Pen- 36 units twice daily   Pt taking Morning: <150 mg/dL 32-35 units , Evening 30-32 units   Mounjaro 10 mg/0.5 mL - once weekly   Injection Day: Sunday     Clarifications to above regimen: None   Adverse Effects: None    SECONDARY PREVENTION  - Statin? Yes  Does pt have proteinuria? Yes If appropriate, is patient on ACEi/ARB? No, Cough from lisinopril  - Aspirin? Yes    Current monitoring regimen:   Patient is using: continuous glucose monitor - Dexcom G7     Testing frequency: CGM    SMBG Readings: 105 mg/dL     TIR 30 days- 75%    Any episodes of hypoglycemia? Yes, 2% time low- 65 mg/dL is lowest .  Did patient treat episode of hypoglycemia appropriately? Yes, easily treated  Does the patient have a prescription for ready-to-use Glucagon? No        Pertinent PMH Review:  - PMH of Pancreatitis: No  - PMH/FH of Medullary Thyroid Cancer: No  - PMH/FH of Multiple Endocrine Neoplasia (MEN) Type II: No  - PMH of Retinopathy: Yes  - PMH of Urinary Tract Infections/Yeast Infections: No    Lab Review  BMP  Lab Results   Component Value Date    CALCIUM 9.8 09/17/2024     09/17/2024    K 4.3 09/17/2024    CO2 24 09/17/2024     09/17/2024     BUN 20 09/17/2024    CREATININE 0.87 09/17/2024    EGFR >90 12/31/2024     LFTs  Lab Results   Component Value Date    ALT 32 04/23/2024    AST 19 04/23/2024    ALKPHOS 102 04/23/2024    BILITOT 0.6 04/23/2024     FLP  Lab Results   Component Value Date    TRIG 170 (H) 09/17/2024    CHOL 165 09/17/2024    LDLF 114 (H) 02/16/2023    LDLCALC 84 09/17/2024    HDL 46.7 09/17/2024       The 10-year ASCVD risk score (Rosa CARLOS, et al., 2019) is: 28.8%    Values used to calculate the score:      Age: 64 years      Sex: Female      Is Non- : Yes      Diabetic: Yes      Tobacco smoker: No      Systolic Blood Pressure: 164 mmHg      Is BP treated: Yes      HDL Cholesterol: 46.7 mg/dL      Total Cholesterol: 165 mg/dL  Urine Microalbumin  Lab Results   Component Value Date    MICROALBCREA 477.1 (H) 09/17/2024     Weight Management  Wt Readings from Last 3 Encounters:   02/17/25 104 kg (230 lb)   01/23/25 107 kg (236 lb 8.9 oz)   01/08/25 108 kg (238 lb)      There is no height or weight on file to calculate BMI.   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  Is pt at goal? No, 7.4%  Patient's SMBGs are: 30-day TIR 75%.     Rationale for plan: Patient reports desire for GLP-1 increase for more weight loss     Medication Changes:  CONTINUE  Jardiance 10 mg - 1 tablet daily   Novolog Flex Pen - written to take per SSI up to 40 units daily  Average Units Injected: 12 AC   No SSI per endo  Semglee 100 u/mL Pen- 36 units twice daily   Pt taking Morning: <150 mg/dL 32-35 units , Evening 28 units if BG <130 mg/dL    INCREASE   Mounjaro 12.5 mg/0.5 mL - once weekly   Patient understands insulin may need to be decreased- plans to  discuss with Endo at next visit    Future Considerations:  May consider further Jardiance/Mounjaro titration with plan to decrease insulin simultaneously             Follow up: I recommend diabetes care be 4 weeks  Endo Follow-Up: 3/20/2025  PCP Follow-Up: Not currently scheduled    Aurora Aguirre PharmD [facility]  Clinical Pharmacy Specialist, Primary Care     Continue all meds under the continuation of care with the referring provider and clinical pharmacy team

## 2025-02-19 ENCOUNTER — PHARMACY VISIT (OUTPATIENT)
Dept: PHARMACY | Facility: CLINIC | Age: 65
End: 2025-02-19
Payer: COMMERCIAL

## 2025-02-21 ENCOUNTER — OFFICE VISIT (OUTPATIENT)
Dept: OPHTHALMOLOGY | Facility: CLINIC | Age: 65
End: 2025-02-21
Payer: COMMERCIAL

## 2025-02-21 DIAGNOSIS — H43.11 VITREOUS HEMORRHAGE, RIGHT EYE (MULTI): ICD-10-CM

## 2025-02-21 DIAGNOSIS — Z96.1 PSEUDOPHAKIA: Primary | ICD-10-CM

## 2025-02-21 DIAGNOSIS — H25.812 COMBINED FORM OF AGE-RELATED CATARACT, LEFT EYE: ICD-10-CM

## 2025-02-21 DIAGNOSIS — H52.4 PRESBYOPIA: ICD-10-CM

## 2025-02-21 DIAGNOSIS — E11.3513 PROLIFERATIVE DIABETIC RETINOPATHY OF BOTH EYES WITH MACULAR EDEMA ASSOCIATED WITH TYPE 2 DIABETES MELLITUS: ICD-10-CM

## 2025-02-21 PROCEDURE — 99024 POSTOP FOLLOW-UP VISIT: CPT | Performed by: OPHTHALMOLOGY

## 2025-02-21 ASSESSMENT — VISUAL ACUITY
OD_SC: 20/25
METHOD: SNELLEN - LINEAR
OS_SC: 20/60

## 2025-02-21 ASSESSMENT — REFRACTION_MANIFEST
OD_ADD: +2.50
OS_CYLINDER: -0.50
OD_SPHERE: PLANO
OD_CYLINDER: SPHERE
OS_AXIS: 070
OD_CYLINDER: -0.25
OS_AXIS: 085
METHOD_AUTOREFRACTION: 1
OS_ADD: +2.50
OS_SPHERE: +1.25
OD_AXIS: 095
OS_CYLINDER: -0.50
OS_SPHERE: +1.25
OD_SPHERE: +0.25

## 2025-02-21 ASSESSMENT — SLIT LAMP EXAM - LIDS
COMMENTS: GOOD POSITION, DERMATOCHALASIS
COMMENTS: GOOD POSITION, DERMATOCHALASIS

## 2025-02-21 ASSESSMENT — ENCOUNTER SYMPTOMS
PSYCHIATRIC NEGATIVE: 0
HEMATOLOGIC/LYMPHATIC NEGATIVE: 0
ALLERGIC/IMMUNOLOGIC NEGATIVE: 0
EYES NEGATIVE: 1
NEUROLOGICAL NEGATIVE: 0
ENDOCRINE NEGATIVE: 0
CONSTITUTIONAL NEGATIVE: 0
GASTROINTESTINAL NEGATIVE: 0
RESPIRATORY NEGATIVE: 0
CARDIOVASCULAR NEGATIVE: 0
MUSCULOSKELETAL NEGATIVE: 0

## 2025-02-21 ASSESSMENT — EXTERNAL EXAM - RIGHT EYE: OD_EXAM: NORMAL

## 2025-02-21 ASSESSMENT — EXTERNAL EXAM - LEFT EYE: OS_EXAM: NORMAL

## 2025-02-21 ASSESSMENT — TONOMETRY
OS_IOP_MMHG: 14
OD_IOP_MMHG: 14
IOP_METHOD: GOLDMANN APPLANATION

## 2025-02-21 ASSESSMENT — CUP TO DISC RATIO: OD_RATIO: .3

## 2025-02-21 ASSESSMENT — REFRACTION_WEARINGRX
OS_SPHERE: LOST
OD_SPHERE: LOST

## 2025-02-21 NOTE — PROGRESS NOTES
Pseudophakia of right eyeZ96.1 - 1/23/25  -Doing well. Good vision. IOP good.  Prednisolone acetate 1% - 2 times a day x 2 weeks, once a day for 1 week, then stop  Ofloxacin - DONE  Ketorolac - DONE  -Advised to call if any redness, pain, decreased vision, flashes, floaters. F/u 1 week - refract OU (autorefract first), glare/BAT left eye. Plan for dilation OU if cataract left eye is visually significant and if patient would like to proceed with cataract surgery.     Combined form of age-related cataract, left eyeH25.812  -Will discuss option of cataract surgery left eye at next visit. F/u 1 week - refract OU (autorefract first), glare/BAT left eye. Plan for dilation OU if cataract left eye is visually significant and if patient would like to proceed with cataract surgery.     PDR (proliferative diabetic retinopathy) both eyes with macular edema  Vitreous syneresis of both eyesH43.393  Vitreous hemorrhage, right eye  -HbA1c= 7.4 (9/17/24).   -OCT macula (2/21/25) - SS: 8/10 OD and 9/10 OS. OD: Thinning, outer retinal disruption temporally. No edema. OS: Normal thickness and contour, no significant central edema. 207/224. Similar to 5/5/23.   -Continue close monitoring of blood glucose, blood pressure, and cholesterol.   -Last seen by Dr. Silver 1/17/25 - s/p PRP OU and anti VEGF. Neovascularization of the disc (NVD) with vitreous hemorrhage OD. OS is s/p PPV EL 2/17/22 - [place]). Had ALESSANDRO OD 1/17/25.     [id number]  -Continue OTC reading glasses  -New Rx for glasses given per patient request. Patient's signature obtained to acknowledge and confirm that a paper copy of glasses Rx was given to patient in compliance with [facility] Eyeglass Rule. Electronic copy of Rx will also be available via [employer]/[email or web address].         No history of refractive surgery.   No FH of AMD/glaucoma

## 2025-02-21 NOTE — PATIENT INSTRUCTIONS
Surgeon: Roxi Parry MD                   [phone]      Patient name: Elina Garcia    Date of visit: February 21, 2025      right eye    Prednisolone acetate (pink or white cap) - 2 times a day for 2 weeks, once a day for 2 week, then stop        Surgical Scheduler (during office hours) - [place]: [phone]

## 2025-02-25 ENCOUNTER — APPOINTMENT (OUTPATIENT)
Dept: PHYSICAL THERAPY | Facility: CLINIC | Age: 65
End: 2025-02-25
Payer: COMMERCIAL

## 2025-03-09 PROCEDURE — RXMED WILLOW AMBULATORY MEDICATION CHARGE

## 2025-03-11 ENCOUNTER — APPOINTMENT (OUTPATIENT)
Dept: PHYSICAL THERAPY | Facility: CLINIC | Age: 65
End: 2025-03-11
Payer: COMMERCIAL

## 2025-03-11 PROCEDURE — RXMED WILLOW AMBULATORY MEDICATION CHARGE

## 2025-03-12 ENCOUNTER — PHARMACY VISIT (OUTPATIENT)
Dept: PHARMACY | Facility: CLINIC | Age: 65
End: 2025-03-12
Payer: COMMERCIAL

## 2025-03-13 ENCOUNTER — APPOINTMENT (OUTPATIENT)
Dept: OPHTHALMOLOGY | Facility: CLINIC | Age: 65
End: 2025-03-13
Payer: COMMERCIAL

## 2025-03-13 DIAGNOSIS — E11.3513 PROLIFERATIVE DIABETIC RETINOPATHY OF BOTH EYES WITH MACULAR EDEMA ASSOCIATED WITH TYPE 2 DIABETES MELLITUS: Primary | ICD-10-CM

## 2025-03-13 DIAGNOSIS — H25.812 COMBINED FORM OF AGE-RELATED CATARACT, LEFT EYE: ICD-10-CM

## 2025-03-13 PROCEDURE — 92134 CPTRZ OPH DX IMG PST SGM RTA: CPT

## 2025-03-13 PROCEDURE — 99213 OFFICE O/P EST LOW 20 MIN: CPT

## 2025-03-13 ASSESSMENT — ENCOUNTER SYMPTOMS
MUSCULOSKELETAL NEGATIVE: 0
ALLERGIC/IMMUNOLOGIC NEGATIVE: 0
ENDOCRINE NEGATIVE: 0
HEMATOLOGIC/LYMPHATIC NEGATIVE: 0
PSYCHIATRIC NEGATIVE: 0
CONSTITUTIONAL NEGATIVE: 0
NEUROLOGICAL NEGATIVE: 0
EYES NEGATIVE: 1
CARDIOVASCULAR NEGATIVE: 0
GASTROINTESTINAL NEGATIVE: 0
RESPIRATORY NEGATIVE: 0

## 2025-03-13 ASSESSMENT — CUP TO DISC RATIO: OD_RATIO: .3

## 2025-03-13 ASSESSMENT — SLIT LAMP EXAM - LIDS
COMMENTS: GOOD POSITION, DERMATOCHALASIS
COMMENTS: GOOD POSITION, DERMATOCHALASIS

## 2025-03-13 ASSESSMENT — VISUAL ACUITY
OD_PH_SC: 20/25
OS_SC: 20/25
OD_SC: 20/30
METHOD: SNELLEN - LINEAR

## 2025-03-13 ASSESSMENT — TONOMETRY
OD_IOP_MMHG: 16
IOP_METHOD: TONOPEN
OS_IOP_MMHG: 18

## 2025-03-13 ASSESSMENT — EXTERNAL EXAM - RIGHT EYE: OD_EXAM: NORMAL

## 2025-03-13 ASSESSMENT — EXTERNAL EXAM - LEFT EYE: OS_EXAM: NORMAL

## 2025-03-13 NOTE — PROGRESS NOTES
Proliferative Diabetic Retinopathy OU   -DM II for 30 years  - HbA1c= 7.4 (9/17/24), (+)insulin & munjaro,     Right eye:   - NVE s/p PRP (as PRP looks adquated) and anti VEGF   - NVD; active today (noted to be regressed on prior exams)   - VH 2+ (inferior)     - OCT 03/13/25  show trace DME (stable), paracentral RPE, EZ disruption    Impression/plan  - Stable no ci- DME OD  - Discussed options of treatment prior to CE surgery  - ALESSANDRO OD done (1/17/25)  - Today no evidence of edema, monitor without treatment      Left eye:   S/p PPV EL OS for DR/Shane Vh (JJ)   Stable     OCT - No DME; scattered HE   20/40 UCVA   If no improvement, consider CEIOL  Patient instructed to contact our office for anti-VEGF pre-treatment OS prior to cataract surgery   RTC 6 months for optos and OCT    Pseudophakia - Right Eye  Combined form of age-related cataract, left eyeH25.812  - following with Dr. Medina

## 2025-03-18 ENCOUNTER — APPOINTMENT (OUTPATIENT)
Dept: PHYSICAL THERAPY | Facility: CLINIC | Age: 65
End: 2025-03-18
Payer: COMMERCIAL

## 2025-03-20 ENCOUNTER — APPOINTMENT (OUTPATIENT)
Dept: ENDOCRINOLOGY | Facility: CLINIC | Age: 65
End: 2025-03-20
Payer: COMMERCIAL

## 2025-03-20 VITALS
SYSTOLIC BLOOD PRESSURE: 132 MMHG | HEART RATE: 84 BPM | HEIGHT: 68 IN | WEIGHT: 235.4 LBS | DIASTOLIC BLOOD PRESSURE: 80 MMHG | BODY MASS INDEX: 35.68 KG/M2 | RESPIRATION RATE: 16 BRPM

## 2025-03-20 DIAGNOSIS — E11.65 POORLY CONTROLLED DIABETES MELLITUS (MULTI): Primary | ICD-10-CM

## 2025-03-20 DIAGNOSIS — E78.5 HYPERLIPIDEMIA, UNSPECIFIED HYPERLIPIDEMIA TYPE: ICD-10-CM

## 2025-03-20 DIAGNOSIS — I10 ESSENTIAL HYPERTENSION: ICD-10-CM

## 2025-03-20 PROCEDURE — 1036F TOBACCO NON-USER: CPT | Performed by: INTERNAL MEDICINE

## 2025-03-20 PROCEDURE — 3008F BODY MASS INDEX DOCD: CPT | Performed by: INTERNAL MEDICINE

## 2025-03-20 PROCEDURE — 3079F DIAST BP 80-89 MM HG: CPT | Performed by: INTERNAL MEDICINE

## 2025-03-20 PROCEDURE — 3075F SYST BP GE 130 - 139MM HG: CPT | Performed by: INTERNAL MEDICINE

## 2025-03-20 PROCEDURE — 99214 OFFICE O/P EST MOD 30 MIN: CPT | Performed by: INTERNAL MEDICINE

## 2025-03-20 ASSESSMENT — ENCOUNTER SYMPTOMS
SHORTNESS OF BREATH: 0
FEVER: 0
COUGH: 0
FATIGUE: 0
VOMITING: 0
HEADACHES: 0
DIARRHEA: 0
NAUSEA: 0
PALPITATIONS: 0
CHILLS: 0
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
## 2025-03-20 NOTE — PATIENT INSTRUCTIONS
Increase mounKayo technology today  Keep working on eating and activity  Follow up in 4 months  Work on dexcom using phone only

## 2025-03-20 NOTE — ASSESSMENT & PLAN NOTE
Labs today  Increase mounjaro as planned  Discussed dexcom and using on phone only to help with downloading numbers  Orders:    Comprehensive Metabolic Panel; Future    Hemoglobin A1C; Future

## 2025-03-27 ENCOUNTER — APPOINTMENT (OUTPATIENT)
Dept: PHARMACY | Facility: HOSPITAL | Age: 65
End: 2025-03-27
Payer: COMMERCIAL

## 2025-03-27 DIAGNOSIS — E11.3513 TYPE 2 DIABETES MELLITUS WITH BOTH EYES AFFECTED BY PROLIFERATIVE RETINOPATHY AND MACULAR EDEMA, WITHOUT LONG-TERM CURRENT USE OF INSULIN: ICD-10-CM

## 2025-03-27 NOTE — ASSESSMENT & PLAN NOTE
Patient's goal A1c is < 7%.  Is pt at goal? No, 7.4%  Patient's SMBGs are generally well controlled.     Rationale for plan: Has not increase GLP-1 yet, wanted to finish supply of 10 mg first.    Medication Changes:  CONTINUE  Jardiance 10 mg - 1 tablet daily   Novolog Flex Pen - written to take per SSI up to 40 units daily  Average Units Injected: 12 AC   No SSI per endo  Semglee 100 u/mL Pen- 36 units twice daily   Pt taking Morning: <150 mg/dL 32-35 units , Evening 28 units if BG <130 mg/dL    INCREASE   Mounjaro 12.5 mg/0.5 mL - once weekly   Patient understands insulin may need to be decreased- plans to discuss with Endo at next visit     Future Considerations:  May consider further Jardiance/Mounjaro titration with plan to decrease insulin simultaneously

## 2025-03-27 NOTE — PROGRESS NOTES
Clinical Pharmacy Appointment  Subjective     Patient ID: Elina Garcia is a 64 y.o. female who presents for Diabetes.    Referring Provider: Bibi Bridges,      DIABETES MELLITUS TYPE 2:    Does patient follow with Endocrinology: Yes - next apt on 8/14/25  Last optometry exam: 7/16/25  Most recent visit in Podiatry: Unknown     Diet: Less of an appetite- pasta with kielbasa last night     Exercise: walking when temp >50 degrees, 20 minute walks when she does walk       Allergies   Allergen Reactions    Metformin Diarrhea       Objective     Current DM Pharmacotherapy:   Jardiance 10 mg - 1 tablet daily   Novolog Flex Pen - written to take per SSI up to 40 units daily  Average Units Injected: 12 AC   No SSI per endo  Semglee 100 u/mL Pen- 36 units twice daily   Pt taking Morning: <150 mg/dL 32-35 units , Evening 30-38 units   Mounjaro 10 mg/0.5 mL - once weekly   Injection Day: Sunday     Clarifications to above regimen: Still had supply of Mounjaro 10 at home, has another 4 doses at this time  Adverse Effects: None    SECONDARY PREVENTION  - Statin? Yes  Does pt have proteinuria? Yes If appropriate, is patient on ACEi/ARB? No, Cough from lisinopril  - Aspirin? Yes    Current monitoring regimen:   Patient is using: continuous glucose monitor - Dexcom G7     Testing frequency: CGM    SMBG Readings:     Well controlled per pt - FBG within range (1-8-130), some highs at night (200s after dinner)    90-day GMI - 7.2%    Any episodes of hypoglycemia? Yes, 2% time low- 65 mg/dL is lowest .  Did patient treat episode of hypoglycemia appropriately? Yes, easily treated  Does the patient have a prescription for ready-to-use Glucagon? No        Pertinent PMH Review:  - PMH of Pancreatitis: No  - PMH/FH of Medullary Thyroid Cancer: No  - PMH/FH of Multiple Endocrine Neoplasia (MEN) Type II: No  - PMH of Retinopathy: Yes  - PMH of Urinary Tract Infections/Yeast Infections: No    Lab Review  BMP  Lab Results   Component  Value Date    CALCIUM 9.8 09/17/2024     09/17/2024    K 4.3 09/17/2024    CO2 24 09/17/2024     09/17/2024    BUN 20 09/17/2024    CREATININE 0.87 09/17/2024    EGFR >90 12/31/2024     LFTs  Lab Results   Component Value Date    ALT 32 04/23/2024    AST 19 04/23/2024    ALKPHOS 102 04/23/2024    BILITOT 0.6 04/23/2024     FLP  Lab Results   Component Value Date    TRIG 170 (H) 09/17/2024    CHOL 165 09/17/2024    LDLF 114 (H) 02/16/2023    LDLCALC 84 09/17/2024    HDL 46.7 09/17/2024       The 10-year ASCVD risk score (Rosa CARLOS, et al., 2019) is: 17.1%    Values used to calculate the score:      Age: 64 years      Sex: Female      Is Non- : Yes      Diabetic: Yes      Tobacco smoker: No      Systolic Blood Pressure: 132 mmHg      Is BP treated: Yes      HDL Cholesterol: 46.7 mg/dL      Total Cholesterol: 165 mg/dL  Urine Microalbumin  Lab Results   Component Value Date    MICROALBCREA 477.1 (H) 09/17/2024     Weight Management  Wt Readings from Last 3 Encounters:   03/20/25 107 kg (235 lb 6.4 oz)   02/17/25 104 kg (230 lb)   01/23/25 107 kg (236 lb 8.9 oz)      There is no height or weight on file to calculate BMI.   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  Is pt at goal? No, 7.4%  Patient's SMBGs are generally well controlled.     Rationale for plan: Has not increase GLP-1 yet, wanted to finish supply of 10 mg first.    Medication Changes:  CONTINUE  Jardiance 10 mg - 1 tablet daily   Novolog Flex Pen - written to take per SSI up to 40 units daily  Average Units Injected: 12 AC   No SSI per endo  Semglee 100 u/mL Pen- 36 units twice daily   Pt taking Morning: <150 mg/dL 32-35 units , Evening 28  units if BG <130 mg/dL    INCREASE   Mounjaro 12.5 mg/0.5 mL - once weekly   Patient understands insulin may need to be decreased- plans to discuss with Endo at next visit     Future Considerations:  May consider further Jardiance/Mounjaro titration with plan to decrease insulin simultaneously               Follow up: I recommend diabetes care be 6 weeks  Endo Follow-Up: 8/14/2025  PCP Follow-Up: Not currently scheduled - advised that she needs to schedule prior to September to continue with pharmacy     Aurora CohenD [facility]  Clinical Pharmacy Specialist, Primary Care     Continue all meds under the continuation of care with the referring provider and clinical pharmacy team

## 2025-04-08 ENCOUNTER — APPOINTMENT (OUTPATIENT)
Dept: PHYSICAL THERAPY | Facility: CLINIC | Age: 65
End: 2025-04-08
Payer: COMMERCIAL

## 2025-04-12 PROCEDURE — RXMED WILLOW AMBULATORY MEDICATION CHARGE

## 2025-04-15 ENCOUNTER — PHARMACY VISIT (OUTPATIENT)
Dept: PHARMACY | Facility: CLINIC | Age: 65
End: 2025-04-15
Payer: COMMERCIAL

## 2025-04-15 ENCOUNTER — APPOINTMENT (OUTPATIENT)
Dept: PHYSICAL THERAPY | Facility: CLINIC | Age: 65
End: 2025-04-15
Payer: COMMERCIAL

## 2025-04-16 DIAGNOSIS — I10 ESSENTIAL HYPERTENSION: Primary | ICD-10-CM

## 2025-04-22 ENCOUNTER — APPOINTMENT (OUTPATIENT)
Dept: PHYSICAL THERAPY | Facility: CLINIC | Age: 65
End: 2025-04-22
Payer: COMMERCIAL

## 2025-05-07 NOTE — PROGRESS NOTES
"  Clinical Pharmacy Appointment  Subjective     Patient ID: Elina Garcia is a 64 y.o. female who presents for No chief complaint on file..    Referring Provider: Bibi Bridges,      DIABETES MELLITUS TYPE 2:    Does patient follow with Endocrinology: Yes - next apt on 8/14/25  Last optometry exam: 7/16/25  Most recent visit in Podiatry: Unknown     Diet: Less of an appetite- generally low carb/healthy    Exercise: walking at least 3 days a week for 25-30 minutes       Allergies   Allergen Reactions    Metformin Diarrhea       Objective     Current DM Pharmacotherapy:   Jardiance 10 mg - 1 tablet daily   Novolog Flex Pen - written to take per SSI up to 40 units daily  Average Units Injected: 12 AC   No SSI per endo  Semglee 100 u/mL Pen- 36 units twice daily   Pt taking Morning: <150 mg/dL 32-35 units , Evening 30-38 units   Mounjaro 12.5 mg/0.5 mL - once weekly   Injection Day: Sunday     Clarifications to above regimen: Has taken 1 dose of Mounjaro 12.5   Adverse Effects: None    SECONDARY PREVENTION  - Statin? Yes  Does pt have proteinuria? Yes If appropriate, is patient on ACEi/ARB? No, Cough from lisinopril  - Aspirin? Yes    Current monitoring regimen:   Patient is using: continuous glucose monitor - Dexcom G7     Testing frequency: CGM    SMBG Readings:     FBG ~110 mg/dL   After dinner- ~180 mg/dL       Any episodes of hypoglycemia? Yes, \"a handful\".  Did patient treat episode of hypoglycemia appropriately? Yes, easily treated  Does the patient have a prescription for ready-to-use Glucagon? No        Pertinent PMH Review:  - PMH of Pancreatitis: No  - PMH/FH of Medullary Thyroid Cancer: No  - PMH/FH of Multiple Endocrine Neoplasia (MEN) Type II: No  - PMH of Retinopathy: Yes  - PMH of Urinary Tract Infections/Yeast Infections: No    Lab Review  BMP  Lab Results   Component Value Date    CALCIUM 9.8 09/17/2024     09/17/2024    K 4.3 09/17/2024    CO2 24 09/17/2024     09/17/2024    BUN 20 " 09/17/2024    CREATININE 0.87 09/17/2024    EGFR >90 12/31/2024     LFTs  Lab Results   Component Value Date    ALT 32 04/23/2024    AST 19 04/23/2024    ALKPHOS 102 04/23/2024    BILITOT 0.6 04/23/2024     FLP  Lab Results   Component Value Date    TRIG 170 (H) 09/17/2024    CHOL 165 09/17/2024    LDLF 114 (H) 02/16/2023    LDLCALC 84 09/17/2024    HDL 46.7 09/17/2024       The 10-year ASCVD risk score (Rosa CARLOS, et al., 2019) is: 17.1%    Values used to calculate the score:      Age: 64 years      Sex: Female      Is Non- : Yes      Diabetic: Yes      Tobacco smoker: No      Systolic Blood Pressure: 132 mmHg      Is BP treated: Yes      HDL Cholesterol: 46.7 mg/dL      Total Cholesterol: 165 mg/dL  Urine Microalbumin  Lab Results   Component Value Date    MICROALBCREA 477.1 (H) 09/17/2024     Weight Management  Wt Readings from Last 3 Encounters:   03/20/25 107 kg (235 lb 6.4 oz)   02/17/25 104 kg (230 lb)   01/23/25 107 kg (236 lb 8.9 oz)      There is no height or weight on file to calculate BMI.   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, bilateral    Patient's goal A1c is < 7%.  Is pt at goal? No, 7.4%  Patient's SMBGs are generally well controlled- a few low BG that pt states are easily treated.     Rationale for plan: On first week of Mounjaro 12.5 mg, no SHAHID at this time, no change in insulin needs yet.    Medication Changes:  CONTINUE  Jardiance 10 mg - 1 tablet daily   Novolog Flex Pen - written to take per SSI up to 40 units daily  Average Units Injected: 12 AC   No SSI per endo  Semglee 100 u/mL Pen- 36 units twice daily   Pt taking Morning: <150 mg/dL 32-35 units , Evening 30-38 units   Mounjaro 12.5 mg/0.5 mL - once weekly   Injection Day:  Sunday       Future Considerations:  Maximize Mounjaro dose   Endo to adjust insulin as needed     Monitoring and Education:  Continue CGM - Dexcom G7                  Follow up: I recommend diabetes care be 4 weeks  Endo Follow-Up: 8/14/2025  PCP Follow-Up: 7/10/2025    Aurora CohenD [facility]  Clinical Pharmacy Specialist, Primary Care     Continue all meds under the continuation of care with the referring provider and clinical pharmacy team

## 2025-05-08 ENCOUNTER — APPOINTMENT (OUTPATIENT)
Dept: PHARMACY | Facility: HOSPITAL | Age: 65
End: 2025-05-08
Payer: COMMERCIAL

## 2025-05-08 DIAGNOSIS — E11.3513 TYPE 2 DIABETES MELLITUS WITH BOTH EYES AFFECTED BY PROLIFERATIVE RETINOPATHY AND MACULAR EDEMA, WITHOUT LONG-TERM CURRENT USE OF INSULIN: ICD-10-CM

## 2025-05-08 DIAGNOSIS — E11.65 POORLY CONTROLLED DIABETES MELLITUS (MULTI): ICD-10-CM

## 2025-05-08 NOTE — ASSESSMENT & PLAN NOTE
Patient's goal A1c is < 7%.  Is pt at goal? No, 7.4%  Patient's SMBGs are generally well controlled- a few low BG that pt states are easily treated.     Rationale for plan: On first week of Mounjaro 12.5 mg, no SHAHID at this time, no change in insulin needs yet.    Medication Changes:  CONTINUE  Jardiance 10 mg - 1 tablet daily   Novolog Flex Pen - written to take per SSI up to 40 units daily  Average Units Injected: 12 AC   No SSI per endo  Semglee 100 u/mL Pen- 36 units twice daily   Pt taking Morning: <150 mg/dL 32-35 units , Evening 30-38 units   Mounjaro 12.5 mg/0.5 mL - once weekly   Injection Day: Sunday       Future Considerations:  Maximize Mounjaro dose   Endo to adjust insulin as needed     Monitoring and Education:  Continue CGM - Dexcom G7

## 2025-05-27 DIAGNOSIS — G89.29 OTHER CHRONIC PAIN: ICD-10-CM

## 2025-05-27 PROCEDURE — RXMED WILLOW AMBULATORY MEDICATION CHARGE

## 2025-05-28 ENCOUNTER — APPOINTMENT (OUTPATIENT)
Dept: PRIMARY CARE | Facility: CLINIC | Age: 65
End: 2025-05-28
Payer: COMMERCIAL

## 2025-05-28 ENCOUNTER — PHARMACY VISIT (OUTPATIENT)
Dept: PHARMACY | Facility: CLINIC | Age: 65
End: 2025-05-28
Payer: COMMERCIAL

## 2025-05-28 RX ORDER — GABAPENTIN 300 MG/1
300 CAPSULE ORAL 2 TIMES DAILY
Qty: 180 CAPSULE | Refills: 0 | Status: SHIPPED | OUTPATIENT
Start: 2025-05-28

## 2025-05-29 DIAGNOSIS — G89.29 OTHER CHRONIC PAIN: ICD-10-CM

## 2025-05-29 RX ORDER — GABAPENTIN 300 MG/1
300 CAPSULE ORAL 2 TIMES DAILY
Qty: 180 CAPSULE | Refills: 0 | OUTPATIENT
Start: 2025-05-29

## 2025-06-05 ENCOUNTER — APPOINTMENT (OUTPATIENT)
Dept: PHARMACY | Facility: HOSPITAL | Age: 65
End: 2025-06-05
Payer: COMMERCIAL

## 2025-06-05 DIAGNOSIS — E11.3513 TYPE 2 DIABETES MELLITUS WITH BOTH EYES AFFECTED BY PROLIFERATIVE RETINOPATHY AND MACULAR EDEMA, WITHOUT LONG-TERM CURRENT USE OF INSULIN: ICD-10-CM

## 2025-06-05 NOTE — ASSESSMENT & PLAN NOTE
Patient's goal A1c is < 7%.  Is pt at goal? No, 7.4%-  Patient's SMBGs are slightly elevated.     3-day avg 152 mg/dL   7-day avg 156 mg/dL   14 day avg 154 mg/dL  Rationale for plan: Slight elevation in averages, may be due to current stress and having lost CGM for ~ 1 week. Patient has ~6 weeks of Mounjaro 12.5 left and wishes to finish these before any dose change.     Medication Changes:  CONTINUE  Jardiance 10 mg - 1 tablet daily   Novolog Flex Pen - written to take per SSI up to 40 units daily  Average Units Injected: 12 AC   No SSI per endo  Semglee 100 u/mL Pen- 36 units twice daily   Pt taking Morning 38 units and Evening 32 units   Mounjaro 12.5 mg/0.5 mL - once weekly     Future Considerations:  Titration to max Mounjaro dose     Monitoring and Education:  Continue CGM

## 2025-06-05 NOTE — PROGRESS NOTES
"  Clinical Pharmacy Appointment  Subjective     Patient ID: Elina Garcia is a 64 y.o. female who presents for Diabetes.    Referring Provider: Bibi Bridges,      DIABETES MELLITUS TYPE 2:    Does patient follow with Endocrinology: Yes - next apt on 25 - but will not be keeping- cannot travel out to new office   Last optometry exam: 25  Most recent visit in Podiatry: Unknown     Diet: Less of an appetite- generally low carb/healthy     Exercise: walking at least 3 days a week for 25-30 minutes       Allergies   Allergen Reactions    Metformin Diarrhea       Objective     Current DM Pharmacotherapy:   Jardiance 10 mg - 1 tablet daily   Novolog Flex Pen - written to take per SSI up to 40 units daily  Average Units Injected: 12 AC   No SSI per endo  Semglee 100 u/mL Pen- 36 units twice daily   Pt taking Morning 38 units and Evening 32 units   Mounjaro 12.5 mg/0.5 mL - once weekly   Injection Day:      Clarifications to above regimen: None  Adverse Effects: None    SECONDARY PREVENTION  - Statin? Yes  Does pt have proteinuria? Yes If appropriate, is patient on ACEi/ARB? No, Cough from lisinopril  - Aspirin? Yes    Current monitoring regimen:   Patient is using: continuous glucose monitor - Dexcom G7     Testing frequency: CGM    SMBG Readings:  Lost CGM for a week     FBG today: 144 mg/dL highest reading     3-day avg 152 mg/dL   7-day avg 156 mg/dL   14 day avg 154 mg/dL 70% in range   90-day av.6% GMI     Any episodes of hypoglycemia? Yes, \"a handful\".  Did patient treat episode of hypoglycemia appropriately? Yes, easily treated  Does the patient have a prescription for ready-to-use Glucagon? No        Pertinent PMH Review:  - PMH of Pancreatitis: No  - PMH/FH of Medullary Thyroid Cancer: No  - PMH/FH of Multiple Endocrine Neoplasia (MEN) Type II: No  - PMH of Retinopathy: Yes  - PMH of Urinary Tract Infections/Yeast Infections: No    Lab Review  BMP  Lab Results   Component Value Date    " CALCIUM 9.8 09/17/2024     09/17/2024    K 4.3 09/17/2024    CO2 24 09/17/2024     09/17/2024    BUN 20 09/17/2024    CREATININE 0.87 09/17/2024    EGFR >90 12/31/2024     LFTs  Lab Results   Component Value Date    ALT 32 04/23/2024    AST 19 04/23/2024    ALKPHOS 102 04/23/2024    BILITOT 0.6 04/23/2024     FLP  Lab Results   Component Value Date    TRIG 170 (H) 09/17/2024    CHOL 165 09/17/2024    LDLF 114 (H) 02/16/2023    LDLCALC 84 09/17/2024    HDL 46.7 09/17/2024       The 10-year ASCVD risk score (Rosa CARLOS, et al., 2019) is: 17.1%    Values used to calculate the score:      Age: 64 years      Sex: Female      Is Non- : Yes      Diabetic: Yes      Tobacco smoker: No      Systolic Blood Pressure: 132 mmHg      Is BP treated: Yes      HDL Cholesterol: 46.7 mg/dL      Total Cholesterol: 165 mg/dL  Urine Microalbumin  Lab Results   Component Value Date    MICROALBCREA 477.1 (H) 09/17/2024     Weight Management  Wt Readings from Last 3 Encounters:   03/20/25 107 kg (235 lb 6.4 oz)   02/17/25 104 kg (230 lb)   01/23/25 107 kg (236 lb 8.9 oz)      There is no height or weight on file to calculate BMI.   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, bilateral    Patient's goal A1c is < 7%.  Is pt at goal? No, 7.4%-  Patient's SMBGs are slightly elevated.     3-day avg 152 mg/dL   7-day avg 156 mg/dL   14 day avg 154 mg/dL  Rationale for plan: Slight elevation in averages, may be due to current stress and having lost CGM for ~ 1 week. Patient has ~6 weeks of Mounjaro 12.5 left and wishes to finish these before any dose change.     Medication Changes:  CONTINUE  Jardiance 10 mg - 1 tablet daily   Novolog Flex Pen - written to take per SSI up to 40  units daily  Average Units Injected: 12 AC   No SSI per endo  Semglee 100 u/mL Pen- 36 units twice daily   Pt taking Morning 38 units and Evening 32 units   Mounjaro 12.5 mg/0.5 mL - once weekly     Future Considerations:  Titration to max Mounjaro dose     Monitoring and Education:  Continue CGM             Follow up: I recommend diabetes care be 4-6 weeks  Endo Follow-Up: 8/14/2025 - but does not intend to keep- does not want to travel to new office, will be seeking transfer to different Endo closer to home  PCP Follow-Up: 6/16/2025    Aurora Aguirre PharmD [facility]  Clinical Pharmacy Specialist, Primary Care     Continue all meds under the continuation of care with the referring provider and clinical pharmacy team

## 2025-06-16 ENCOUNTER — APPOINTMENT (OUTPATIENT)
Dept: PRIMARY CARE | Facility: CLINIC | Age: 65
End: 2025-06-16
Payer: COMMERCIAL

## 2025-06-16 VITALS
DIASTOLIC BLOOD PRESSURE: 62 MMHG | BODY MASS INDEX: 35 KG/M2 | OXYGEN SATURATION: 97 % | WEIGHT: 230.2 LBS | HEART RATE: 92 BPM | RESPIRATION RATE: 12 BRPM | SYSTOLIC BLOOD PRESSURE: 116 MMHG | TEMPERATURE: 98 F

## 2025-06-16 DIAGNOSIS — Z12.31 SCREENING MAMMOGRAM FOR BREAST CANCER: ICD-10-CM

## 2025-06-16 DIAGNOSIS — B37.31 VAGINAL YEAST INFECTION: Primary | ICD-10-CM

## 2025-06-16 DIAGNOSIS — E11.65 POORLY CONTROLLED DIABETES MELLITUS (MULTI): ICD-10-CM

## 2025-06-16 DIAGNOSIS — R15.9 INCONTINENCE OF FECES, UNSPECIFIED FECAL INCONTINENCE TYPE: ICD-10-CM

## 2025-06-16 DIAGNOSIS — K58.0 IRRITABLE BOWEL SYNDROME WITH DIARRHEA: ICD-10-CM

## 2025-06-16 DIAGNOSIS — R19.7 DIARRHEA, UNSPECIFIED TYPE: ICD-10-CM

## 2025-06-16 DIAGNOSIS — E11.3513 TYPE 2 DIABETES MELLITUS WITH BOTH EYES AFFECTED BY PROLIFERATIVE RETINOPATHY AND MACULAR EDEMA, WITHOUT LONG-TERM CURRENT USE OF INSULIN: ICD-10-CM

## 2025-06-16 PROCEDURE — 99213 OFFICE O/P EST LOW 20 MIN: CPT | Performed by: FAMILY MEDICINE

## 2025-06-16 PROCEDURE — 99396 PREV VISIT EST AGE 40-64: CPT | Performed by: FAMILY MEDICINE

## 2025-06-16 PROCEDURE — 3074F SYST BP LT 130 MM HG: CPT | Performed by: FAMILY MEDICINE

## 2025-06-16 PROCEDURE — RXMED WILLOW AMBULATORY MEDICATION CHARGE

## 2025-06-16 PROCEDURE — 3078F DIAST BP <80 MM HG: CPT | Performed by: FAMILY MEDICINE

## 2025-06-16 RX ORDER — TIRZEPATIDE 15 MG/.5ML
15 INJECTION, SOLUTION SUBCUTANEOUS WEEKLY
Qty: 2 ML | Refills: 0 | Status: SHIPPED | OUTPATIENT
Start: 2025-06-16

## 2025-06-16 RX ORDER — INSULIN GLARGINE-YFGN 100 [IU]/ML
INJECTION, SOLUTION SUBCUTANEOUS
Qty: 90 ML | Refills: 1 | Status: SHIPPED | OUTPATIENT
Start: 2025-06-16

## 2025-06-16 RX ORDER — INSULIN ASPART 100 [IU]/ML
INJECTION, SOLUTION INTRAVENOUS; SUBCUTANEOUS
Qty: 45 ML | Refills: 1 | Status: SHIPPED | OUTPATIENT
Start: 2025-06-16

## 2025-06-16 RX ORDER — BLOOD-GLUCOSE SENSOR
EACH MISCELLANEOUS
Qty: 9 EACH | Refills: 3 | Status: SHIPPED | OUTPATIENT
Start: 2025-06-16

## 2025-06-16 RX ORDER — INSULIN GLARGINE-YFGN 100 [IU]/ML
INJECTION, SOLUTION SUBCUTANEOUS
Qty: 95 ML | Refills: 1 | Status: CANCELLED | OUTPATIENT
Start: 2025-06-16

## 2025-06-16 RX ORDER — BLOOD-GLUCOSE,RECEIVER,CONT
EACH MISCELLANEOUS
Qty: 1 EACH | Refills: 0 | Status: SHIPPED | OUTPATIENT
Start: 2025-06-16

## 2025-06-16 RX ORDER — FLUCONAZOLE 150 MG/1
TABLET ORAL
Qty: 2 TABLET | Refills: 0 | Status: SHIPPED | OUTPATIENT
Start: 2025-06-16

## 2025-06-16 NOTE — ASSESSMENT & PLAN NOTE
Orders:    Dexcom G7  misc; Use as instructed    insulin aspart (NovoLOG Flexpen U-100 Insulin) 100 unit/mL (3 mL) pen; Inject with meals up to 40 units daily  12 units TID after each meal    Lipid Panel Non-Fasting; Future    Albumin-Creatinine Ratio, Urine Random; Future    CBC and Auto Differential; Future    blood-glucose sensor (Dexcom G7 Sensor) device; Change every 10 days    insulin glargine-yfgn (Semglee,insulin glarg-yfgn,Pen) 100 unit/mL (3 mL) pen; Take as directed per insulin instructions.INJECT 38  UNITS UNDER THE SKIN EVERY MORNING AND 32 UNITS EVERY EVENING

## 2025-06-16 NOTE — PROGRESS NOTES
Subjective   Patient ID: Elina Garcia is a 64 y.o. female who presents for Annual Exam (physical).    HPI   DM- Using mounjaro 12.5   Willing to inc dose - doing well  Lab Results   Component Value Date    HGBA1C 7.4 (H) 06/21/2025    HGBA1C 7.4 (H) 09/17/2024    HGBA1C 8.5 (A) 07/23/2024   Has a vaginal yeast infection- itchy with white dc    Hx of stool leakage- wearing adult depends  Would like to see GI    Review of Systems    All systems reviewed and neg if not noted in the HPI above     Objective   /62   Pulse 92   Temp 36.7 °C (98 °F)   Resp 12   Wt 104 kg (230 lb 3.2 oz)   SpO2 97%   BMI 35.00 kg/m²     Physical Exam  Constitutional:       Appearance: Normal appearance.   HENT:      Head: Normocephalic.      Right Ear: External ear normal.      Left Ear: External ear normal.      Nose: Nose normal.      Mouth/Throat:      Pharynx: Oropharynx is clear.   Eyes:      Extraocular Movements: Extraocular movements intact.   Neck:      Thyroid: No thyroid mass, thyromegaly or thyroid tenderness.      Vascular: No carotid bruit.   Cardiovascular:      Rate and Rhythm: Normal rate and regular rhythm.      Heart sounds: Normal heart sounds. No murmur heard.  Pulmonary:      Effort: Pulmonary effort is normal. No respiratory distress.      Breath sounds: Normal breath sounds. No wheezing.   Abdominal:      General: Bowel sounds are normal.      Palpations: Abdomen is soft. There is no mass.      Tenderness: There is no abdominal tenderness.   Musculoskeletal:         General: Normal range of motion.      Cervical back: Normal range of motion.      Right lower leg: No edema.      Left lower leg: No edema.   Skin:     General: Skin is warm and dry.      Findings: No rash.   Neurological:      General: No focal deficit present.      Mental Status: She is alert and oriented to person, place, and time.      Motor: No weakness.   Psychiatric:         Mood and Affect: Mood normal.         Behavior: Behavior  normal.         Assessment/Plan   Assessment & Plan  Poorly controlled diabetes mellitus (Multi)    Orders:    Dexcom G7  misc; Use as instructed    insulin aspart (NovoLOG Flexpen U-100 Insulin) 100 unit/mL (3 mL) pen; Inject with meals up to 40 units daily  12 units TID after each meal    Lipid Panel Non-Fasting; Future    Albumin-Creatinine Ratio, Urine Random; Future    CBC and Auto Differential; Future    blood-glucose sensor (Dexcom G7 Sensor) device; Change every 10 days    insulin glargine-yfgn (Semglee,insulin glarg-yfgn,Pen) 100 unit/mL (3 mL) pen; Take as directed per insulin instructions.INJECT 38  UNITS UNDER THE SKIN EVERY MORNING AND 32 UNITS EVERY EVENING    Vaginal yeast infection    Orders:    fluconazole (Diflucan) 150 mg tablet; Take 1 tablet orally one time only.  May repeat in 3 days if needed.    Type 2 diabetes mellitus with both eyes affected by proliferative retinopathy and macular edema, without long-term current use of insulin    Orders:    Lipid Panel Non-Fasting; Future    Albumin-Creatinine Ratio, Urine Random; Future    CBC and Auto Differential; Future    tirzepatide (Mounjaro) 15 mg/0.5 mL pen injector; Inject 15 mg under the skin 1 (one) time per week.    CT cardiac scoring wo IV contrast; Future    Diarrhea, unspecified type    Orders:    Referral to Gastroenterology; Future    Irritable bowel syndrome with diarrhea    Orders:    Referral to Gastroenterology; Future    Screening mammogram for breast cancer    Orders:    BI mammo bilateral screening tomosynthesis; Future    Incontinence of feces, unspecified fecal incontinence type    Orders:    Referral to Gastroenterology; Future              Please follow up in   - 6months for DM/ GI  - or as needed.

## 2025-06-16 NOTE — PATIENT INSTRUCTIONS
DM  - Inc the mounjaro from 12.5 to 15 use  - consider inc the jardiance  - labs today    To call for well woman  Mammogram due 6/27/25    To call GI for appt      Please follow up in   - 6months for DM/ GI  - or as needed.       ** If labs or imaging ordered at today's visit, all the non-urgent results will be discussed at your next visit    If you have been referred for a special test or to a specialist please call  [phone] to schedule an appointment.  If you have any further questions, or if develop new or worsened symptoms, please give our office a call at [phone].

## 2025-06-16 NOTE — ASSESSMENT & PLAN NOTE
Orders:    Lipid Panel Non-Fasting; Future    Albumin-Creatinine Ratio, Urine Random; Future    CBC and Auto Differential; Future    tirzepatide (Mounjaro) 15 mg/0.5 mL pen injector; Inject 15 mg under the skin 1 (one) time per week.    CT cardiac scoring wo IV contrast; Future

## 2025-06-17 PROCEDURE — RXMED WILLOW AMBULATORY MEDICATION CHARGE

## 2025-06-19 PROCEDURE — RXMED WILLOW AMBULATORY MEDICATION CHARGE

## 2025-06-21 ENCOUNTER — PHARMACY VISIT (OUTPATIENT)
Dept: PHARMACY | Facility: CLINIC | Age: 65
End: 2025-06-21
Payer: COMMERCIAL

## 2025-06-22 LAB
ALBUMIN SERPL-MCNC: 4.1 G/DL (ref 3.6–5.1)
ALBUMIN/CREAT UR: NORMAL
ALP SERPL-CCNC: 99 U/L (ref 37–153)
ALT SERPL-CCNC: 28 U/L (ref 6–29)
ANION GAP SERPL CALCULATED.4IONS-SCNC: 7 MMOL/L (CALC) (ref 7–17)
AST SERPL-CCNC: 24 U/L (ref 10–35)
BASOPHILS # BLD AUTO: 62 CELLS/UL (ref 0–200)
BASOPHILS NFR BLD AUTO: 0.8 %
BILIRUB SERPL-MCNC: 0.4 MG/DL (ref 0.2–1.2)
BUN SERPL-MCNC: 23 MG/DL (ref 7–25)
CALCIUM SERPL-MCNC: 9.2 MG/DL (ref 8.6–10.4)
CHLORIDE SERPL-SCNC: 109 MMOL/L (ref 98–110)
CHOLEST SERPL-MCNC: 133 MG/DL
CHOLEST/HDLC SERPL: 2.9 (CALC)
CO2 SERPL-SCNC: 25 MMOL/L (ref 20–32)
CREAT SERPL-MCNC: 0.94 MG/DL (ref 0.5–1.05)
CREAT UR-MCNC: NORMAL MG/DL
EGFRCR SERPLBLD CKD-EPI 2021: 68 ML/MIN/1.73M2
EOSINOPHIL # BLD AUTO: 108 CELLS/UL (ref 15–500)
EOSINOPHIL NFR BLD AUTO: 1.4 %
ERYTHROCYTE [DISTWIDTH] IN BLOOD BY AUTOMATED COUNT: 13.4 % (ref 11–15)
EST. AVERAGE GLUCOSE BLD GHB EST-MCNC: 166 MG/DL
EST. AVERAGE GLUCOSE BLD GHB EST-SCNC: 9.2 MMOL/L
GLUCOSE SERPL-MCNC: 153 MG/DL (ref 65–99)
HBA1C MFR BLD: 7.4 %
HCT VFR BLD AUTO: 42.9 % (ref 35–45)
HDLC SERPL-MCNC: 46 MG/DL
HGB BLD-MCNC: 13.8 G/DL (ref 11.7–15.5)
LDLC SERPL CALC-MCNC: 67 MG/DL (CALC)
LYMPHOCYTES # BLD AUTO: 1240 CELLS/UL (ref 850–3900)
LYMPHOCYTES NFR BLD AUTO: 16.1 %
MCH RBC QN AUTO: 30.1 PG (ref 27–33)
MCHC RBC AUTO-ENTMCNC: 32.2 G/DL (ref 32–36)
MCV RBC AUTO: 93.5 FL (ref 80–100)
MICROALBUMIN UR-MCNC: NORMAL
MONOCYTES # BLD AUTO: 593 CELLS/UL (ref 200–950)
MONOCYTES NFR BLD AUTO: 7.7 %
NEUTROPHILS # BLD AUTO: 5698 CELLS/UL (ref 1500–7800)
NEUTROPHILS NFR BLD AUTO: 74 %
NONHDLC SERPL-MCNC: 87 MG/DL (CALC)
PLATELET # BLD AUTO: 217 THOUSAND/UL (ref 140–400)
PMV BLD REES-ECKER: 10.6 FL (ref 7.5–12.5)
POTASSIUM SERPL-SCNC: 4.3 MMOL/L (ref 3.5–5.3)
PROT SERPL-MCNC: 6.9 G/DL (ref 6.1–8.1)
RBC # BLD AUTO: 4.59 MILLION/UL (ref 3.8–5.1)
SODIUM SERPL-SCNC: 141 MMOL/L (ref 135–146)
TRIGL SERPL-MCNC: 114 MG/DL
WBC # BLD AUTO: 7.7 THOUSAND/UL (ref 3.8–10.8)

## 2025-06-23 LAB
ALBUMIN/CREAT UR: 109 MG/G CREAT
BASOPHILS # BLD AUTO: 62 CELLS/UL (ref 0–200)
BASOPHILS NFR BLD AUTO: 0.8 %
CHOLEST SERPL-MCNC: 133 MG/DL
CHOLEST/HDLC SERPL: 2.9 (CALC)
CREAT UR-MCNC: 104 MG/DL (ref 20–275)
EOSINOPHIL # BLD AUTO: 108 CELLS/UL (ref 15–500)
EOSINOPHIL NFR BLD AUTO: 1.4 %
ERYTHROCYTE [DISTWIDTH] IN BLOOD BY AUTOMATED COUNT: 13.4 % (ref 11–15)
HCT VFR BLD AUTO: 42.9 % (ref 35–45)
HDLC SERPL-MCNC: 46 MG/DL
HGB BLD-MCNC: 13.8 G/DL (ref 11.7–15.5)
LDLC SERPL CALC-MCNC: 67 MG/DL (CALC)
LYMPHOCYTES # BLD AUTO: 1240 CELLS/UL (ref 850–3900)
LYMPHOCYTES NFR BLD AUTO: 16.1 %
MCH RBC QN AUTO: 30.1 PG (ref 27–33)
MCHC RBC AUTO-ENTMCNC: 32.2 G/DL (ref 32–36)
MCV RBC AUTO: 93.5 FL (ref 80–100)
MICROALBUMIN UR-MCNC: 11.3 MG/DL
MONOCYTES # BLD AUTO: 593 CELLS/UL (ref 200–950)
MONOCYTES NFR BLD AUTO: 7.7 %
NEUTROPHILS # BLD AUTO: 5698 CELLS/UL (ref 1500–7800)
NEUTROPHILS NFR BLD AUTO: 74 %
NONHDLC SERPL-MCNC: 87 MG/DL (CALC)
PLATELET # BLD AUTO: 217 THOUSAND/UL (ref 140–400)
PMV BLD REES-ECKER: 10.6 FL (ref 7.5–12.5)
RBC # BLD AUTO: 4.59 MILLION/UL (ref 3.8–5.1)
TRIGL SERPL-MCNC: 114 MG/DL
WBC # BLD AUTO: 7.7 THOUSAND/UL (ref 3.8–10.8)

## 2025-07-08 ENCOUNTER — APPOINTMENT (OUTPATIENT)
Dept: RADIOLOGY | Facility: CLINIC | Age: 65
End: 2025-07-08
Payer: COMMERCIAL

## 2025-07-08 VITALS — BODY MASS INDEX: 34.97 KG/M2 | WEIGHT: 230 LBS

## 2025-07-08 DIAGNOSIS — Z12.31 SCREENING MAMMOGRAM FOR BREAST CANCER: ICD-10-CM

## 2025-07-08 PROCEDURE — 77063 BREAST TOMOSYNTHESIS BI: CPT | Performed by: STUDENT IN AN ORGANIZED HEALTH CARE EDUCATION/TRAINING PROGRAM

## 2025-07-08 PROCEDURE — 77067 SCR MAMMO BI INCL CAD: CPT

## 2025-07-08 PROCEDURE — 77067 SCR MAMMO BI INCL CAD: CPT | Performed by: STUDENT IN AN ORGANIZED HEALTH CARE EDUCATION/TRAINING PROGRAM

## 2025-07-10 ENCOUNTER — APPOINTMENT (OUTPATIENT)
Dept: PRIMARY CARE | Facility: CLINIC | Age: 65
End: 2025-07-10
Payer: COMMERCIAL

## 2025-07-10 ENCOUNTER — TELEMEDICINE (OUTPATIENT)
Dept: PHARMACY | Facility: HOSPITAL | Age: 65
End: 2025-07-10
Payer: COMMERCIAL

## 2025-07-10 DIAGNOSIS — E11.3513 TYPE 2 DIABETES MELLITUS WITH BOTH EYES AFFECTED BY PROLIFERATIVE RETINOPATHY AND MACULAR EDEMA, WITHOUT LONG-TERM CURRENT USE OF INSULIN: ICD-10-CM

## 2025-07-10 PROCEDURE — RXMED WILLOW AMBULATORY MEDICATION CHARGE

## 2025-07-10 RX ORDER — TIRZEPATIDE 15 MG/.5ML
15 INJECTION, SOLUTION SUBCUTANEOUS WEEKLY
Qty: 2 ML | Refills: 3 | Status: SHIPPED | OUTPATIENT
Start: 2025-07-10

## 2025-07-10 NOTE — ASSESSMENT & PLAN NOTE
Patient's goal A1c is < 7%.  Is pt at goal? No, 7.4%  Patient's SMBGs are elevated recently, under a lot of stress.     Rationale for plan: Has not started Mounjaro 15 yet, but did pick-up. Yeast infection resolved with diflucan, no issues since.     Medication Changes:  CONTINUE  Jardiance 10 mg - 1 tablet daily   Novolog FlexPen- 12 units AC   Semglee 100 u/mL Pen  INCREASE  Mounjaro 15 mg/0.5 mL - once weekly     Future Considerations:  Jardiance increase    Monitoring and Education:  Continue CGM

## 2025-07-10 NOTE — PROGRESS NOTES
"  Clinical Pharmacy Appointment  Subjective     Patient ID: Elina Garcia is a 65 y.o. female who presents for Diabetes.    Referring Provider: Bibi Bridges DO   Last visit: 25    Interval Hx:   Has not yet increase Mounjaro as directed by PCP- wants to use up Mounjaro 12.5 mg first - was off work for a week and came back to large work load- was recognized as a [employer] employee and will be going on a company paid trip to [place] from -    DIABETES MELLITUS TYPE 2:    Does patient follow with Endocrinology: Yes - next apt on 25   Optometry exam: 25  Most recent visit in Podiatry: Unknown     Diet: Less of an appetite- generally low carb/healthy     Exercise: walking at least 3 days a week for 25-30 minutes       Allergies   Allergen Reactions    Metformin Diarrhea       Objective     Current DM Pharmacotherapy:   Jardiance 10 mg - 1 tablet daily   Novolog Flex Pen - 12 AC TID  No SSI per endo  Semglee 100 u/mL Pen- 36 units twice daily   Pt taking Morning 38 units and Evening 32 units - takes 42 units if over 200 mg/dL   Mounjaro 15 mg/0.5 mL - once weekly (Sundays)  Injection Day:      Clarifications to above regimen: Did not receive Mounjaro 15 mg still taking 12.5   Adverse Effects: None    SECONDARY PREVENTION  - Statin? Yes  Does pt have proteinuria? Yes If appropriate, is patient on ACEi/ARB? No, Cough from lisinopril  - Aspirin? Yes    Current monitoring regimen:   Patient is using: continuous glucose monitor - Dexcom G7     Testing frequency: CGM    SMBG Readings:      FBG today:     7-day av mg/dL   14-day av mg/dL  30-day av mg/dL       Any episodes of hypoglycemia? Yes, \"a handful\".  Did patient treat episode of hypoglycemia appropriately? Yes, easily treated  Does the patient have a prescription for ready-to-use Glucagon? No        Pertinent PMH Review:  - PMH of Pancreatitis: No  - PMH/FH of Medullary Thyroid Cancer: No  - PMH/FH of Multiple Endocrine " Neoplasia (MEN) Type II: No  - PMH of Retinopathy: Yes  - PMH of Urinary Tract Infections/Yeast Infections: No    Lab Review  BMP  Lab Results   Component Value Date    CALCIUM 9.2 06/21/2025     06/21/2025    K 4.3 06/21/2025    CO2 25 06/21/2025     06/21/2025    BUN 23 06/21/2025    CREATININE 0.94 06/21/2025    EGFR 68 06/21/2025     LFTs  Lab Results   Component Value Date    ALT 28 06/21/2025    AST 24 06/21/2025    ALKPHOS 99 06/21/2025    BILITOT 0.4 06/21/2025     FLP  Lab Results   Component Value Date    TRIG 114 06/21/2025    CHOL 133 06/21/2025    LDLF 114 (H) 02/16/2023    LDLCALC 67 06/21/2025    HDL 46 (L) 06/21/2025       The 10-year ASCVD risk score (Rosa CARLOS, et al., 2019) is: 10.8%    Values used to calculate the score:      Age: 65 years      Sex: Female      Is Non- : Yes      Diabetic: Yes      Tobacco smoker: No      Systolic Blood Pressure: 116 mmHg      Is BP treated: Yes      HDL Cholesterol: 46 mg/dL      Total Cholesterol: 133 mg/dL  Urine Microalbumin  Lab Results   Component Value Date    MICROALBCREA 109 (H) 06/21/2025     Weight Management  Wt Readings from Last 3 Encounters:   07/08/25 104 kg (230 lb)   06/16/25 104 kg (230 lb 3.2 oz)   03/20/25 107 kg (235 lb 6.4 oz)      There is no height or weight on file to calculate BMI.   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  Is pt at goal? No, 7.4%  Patient's SMBGs are elevated recently, under a lot of stress.     Rationale for plan: Has not started Mounjaro 15 yet, but did pick-up. Yeast infection resolved with diflucan, no issues since.     Medication Changes:  CONTINUE  Jardiance 10 mg - 1 tablet daily   Novolog FlexPen-  12 units AC   Semglee 100 u/mL Pen  INCREASE  Mounjaro 15 mg/0.5 mL - once weekly     Future Considerations:  Jardiance increase    Monitoring and Education:  Continue CGM            Relevant Medications    tirzepatide (Mounjaro) 15 mg/0.5 mL pen injector    Other Relevant Orders    Referral to Clinical Pharmacy       Follow up: I recommend diabetes care be 6-8 weeks  Endo Follow-Up: 8/14/2025 - will be continuing with her   PCP Follow-Up: 12/16/25    Aurora CohenD [facility]  Clinical Pharmacy Specialist, Primary Care     Continue all meds under the continuation of care with the referring provider and clinical pharmacy team

## 2025-07-11 ENCOUNTER — PHARMACY VISIT (OUTPATIENT)
Dept: PHARMACY | Facility: CLINIC | Age: 65
End: 2025-07-11
Payer: COMMERCIAL

## 2025-07-16 ENCOUNTER — APPOINTMENT (OUTPATIENT)
Dept: OPHTHALMOLOGY | Facility: CLINIC | Age: 65
End: 2025-07-16
Payer: COMMERCIAL

## 2025-07-16 DIAGNOSIS — E11.3513 PROLIFERATIVE DIABETIC RETINOPATHY OF BOTH EYES WITH MACULAR EDEMA ASSOCIATED WITH TYPE 2 DIABETES MELLITUS: Primary | ICD-10-CM

## 2025-07-16 PROCEDURE — 92134 CPTRZ OPH DX IMG PST SGM RTA: CPT

## 2025-07-16 PROCEDURE — 99213 OFFICE O/P EST LOW 20 MIN: CPT

## 2025-07-16 ASSESSMENT — VISUAL ACUITY
OS_SC: 20/25
OD_PH_SC+: +1
OD_PH_SC: 20/30
OS_SC+: -2+1
OD_SC: 20/40
METHOD: SNELLEN - LINEAR

## 2025-07-16 ASSESSMENT — ENCOUNTER SYMPTOMS
ALLERGIC/IMMUNOLOGIC NEGATIVE: 0
GASTROINTESTINAL NEGATIVE: 0
CONSTITUTIONAL NEGATIVE: 0
EYES NEGATIVE: 1
MUSCULOSKELETAL NEGATIVE: 0
RESPIRATORY NEGATIVE: 0
NEUROLOGICAL NEGATIVE: 0
HEMATOLOGIC/LYMPHATIC NEGATIVE: 0
PSYCHIATRIC NEGATIVE: 0
CARDIOVASCULAR NEGATIVE: 0
ENDOCRINE NEGATIVE: 0

## 2025-07-16 ASSESSMENT — TONOMETRY
OS_IOP_MMHG: 18
IOP_METHOD: TONOPEN
OD_IOP_MMHG: 19

## 2025-07-16 ASSESSMENT — SLIT LAMP EXAM - LIDS
COMMENTS: GOOD POSITION, DERMATOCHALASIS
COMMENTS: GOOD POSITION, DERMATOCHALASIS

## 2025-07-16 ASSESSMENT — EXTERNAL EXAM - RIGHT EYE: OD_EXAM: NORMAL

## 2025-07-16 ASSESSMENT — CUP TO DISC RATIO: OD_RATIO: .3

## 2025-07-16 ASSESSMENT — EXTERNAL EXAM - LEFT EYE: OS_EXAM: NORMAL

## 2025-07-16 NOTE — PROGRESS NOTES
Proliferative Diabetic Retinopathy OU   -DM II for 30 years  - HbA1c= 7.4 (9/17/24), (+)insulin & munjaro,     Right eye:  - New VH - Resolving    - NVE s/p PRP (as PRP looks adquated) and anti VEGF   - NVD; active today (noted to be regressed on prior exams)     - OCT 07/16/25  show trace DME (stable), paracentral RPE, EZ disruption    - s/p ALESSANDRO OD done (1/17/25)    Impression/plan  - no ci- DME - Slightly worse today   - Discussed options of treatment prior to CE surgery  - Discussed Fill in PRP if VH recurs or IVT if DME worsens   - RTC in 5 months      Left eye:   S/p PPV EL OS for DR/Recurrnet Vh ([id number])   Stable     OCT - No DME; scattered HE   20/40 UCVA   If no improvement, consider CEIOL  Patient instructed to contact our office for anti-VEGF pre-treatment OS prior to cataract surgery   RTC 6 months for optos and OCT    Pseudophakia - Right Eye  Combined form of age-related cataract, left eyeH25.812  - following with Dr. Medina

## 2025-08-13 DIAGNOSIS — E11.65 POORLY CONTROLLED DIABETES MELLITUS (MULTI): ICD-10-CM

## 2025-08-13 RX ORDER — EMPAGLIFLOZIN 10 MG/1
10 TABLET, FILM COATED ORAL DAILY
Qty: 90 TABLET | Refills: 3 | Status: SHIPPED | OUTPATIENT
Start: 2025-08-13

## 2025-08-14 ENCOUNTER — APPOINTMENT (OUTPATIENT)
Dept: ENDOCRINOLOGY | Facility: CLINIC | Age: 65
End: 2025-08-14
Payer: COMMERCIAL

## 2025-08-14 VITALS
RESPIRATION RATE: 16 BRPM | HEIGHT: 68 IN | WEIGHT: 223 LBS | HEART RATE: 73 BPM | DIASTOLIC BLOOD PRESSURE: 70 MMHG | BODY MASS INDEX: 33.8 KG/M2 | SYSTOLIC BLOOD PRESSURE: 114 MMHG

## 2025-08-14 DIAGNOSIS — E78.5 HYPERLIPIDEMIA, UNSPECIFIED HYPERLIPIDEMIA TYPE: ICD-10-CM

## 2025-08-14 DIAGNOSIS — I10 ESSENTIAL HYPERTENSION: ICD-10-CM

## 2025-08-14 DIAGNOSIS — E11.65 POORLY CONTROLLED DIABETES MELLITUS (MULTI): Primary | ICD-10-CM

## 2025-08-14 PROCEDURE — 99214 OFFICE O/P EST MOD 30 MIN: CPT | Performed by: INTERNAL MEDICINE

## 2025-08-14 PROCEDURE — 1036F TOBACCO NON-USER: CPT | Performed by: INTERNAL MEDICINE

## 2025-08-14 PROCEDURE — 3078F DIAST BP <80 MM HG: CPT | Performed by: INTERNAL MEDICINE

## 2025-08-14 PROCEDURE — 3074F SYST BP LT 130 MM HG: CPT | Performed by: INTERNAL MEDICINE

## 2025-08-14 PROCEDURE — 3008F BODY MASS INDEX DOCD: CPT | Performed by: INTERNAL MEDICINE

## 2025-08-14 ASSESSMENT — ENCOUNTER SYMPTOMS
DIARRHEA: 0
DEPRESSION: 0
OCCASIONAL FEELINGS OF UNSTEADINESS: 0
COUGH: 0
CHILLS: 0
SHORTNESS OF BREATH: 0
FATIGUE: 0
LOSS OF SENSATION IN FEET: 1
NAUSEA: 0
FEVER: 0
VOMITING: 0
HEADACHES: 0
PALPITATIONS: 0

## 2025-08-14 ASSESSMENT — PAIN SCALES - GENERAL: PAINLEVEL_OUTOF10: 0-NO PAIN

## 2025-08-25 ENCOUNTER — APPOINTMENT (OUTPATIENT)
Dept: OPHTHALMOLOGY | Facility: CLINIC | Age: 65
End: 2025-08-25
Payer: COMMERCIAL

## 2025-08-28 ENCOUNTER — APPOINTMENT (OUTPATIENT)
Dept: PHARMACY | Facility: HOSPITAL | Age: 65
End: 2025-08-28
Payer: COMMERCIAL

## 2025-08-28 DIAGNOSIS — E11.3513 TYPE 2 DIABETES MELLITUS WITH BOTH EYES AFFECTED BY PROLIFERATIVE RETINOPATHY AND MACULAR EDEMA, WITHOUT LONG-TERM CURRENT USE OF INSULIN: ICD-10-CM

## 2025-11-20 ENCOUNTER — APPOINTMENT (OUTPATIENT)
Dept: PHARMACY | Facility: HOSPITAL | Age: 65
End: 2025-11-20
Payer: COMMERCIAL

## 2025-12-16 ENCOUNTER — APPOINTMENT (OUTPATIENT)
Dept: PRIMARY CARE | Facility: CLINIC | Age: 65
End: 2025-12-16
Payer: COMMERCIAL

## 2025-12-17 ENCOUNTER — APPOINTMENT (OUTPATIENT)
Dept: OPHTHALMOLOGY | Facility: CLINIC | Age: 65
End: 2025-12-17
Payer: COMMERCIAL

## (undated) DEVICE — SOLUTION, OPHTHALMIC, TETRACAINE HCL 0.5%, 2 ML, VIAL

## (undated) DEVICE — APPLICATOR, COTTON TIP, 6 IN, 2PK, STERILE